# Patient Record
Sex: FEMALE | Race: WHITE | Employment: OTHER | URBAN - METROPOLITAN AREA
[De-identification: names, ages, dates, MRNs, and addresses within clinical notes are randomized per-mention and may not be internally consistent; named-entity substitution may affect disease eponyms.]

---

## 2017-04-06 DIAGNOSIS — Z12.31 ENCOUNTER FOR SCREENING MAMMOGRAM FOR MALIGNANT NEOPLASM OF BREAST: ICD-10-CM

## 2017-05-01 ENCOUNTER — HOSPITAL ENCOUNTER (OUTPATIENT)
Dept: MAMMOGRAPHY | Facility: CLINIC | Age: 62
Discharge: HOME | End: 2017-05-01
Payer: COMMERCIAL

## 2017-05-01 DIAGNOSIS — Z12.31 ENCOUNTER FOR SCREENING MAMMOGRAM FOR MALIGNANT NEOPLASM OF BREAST: ICD-10-CM

## 2017-05-01 PROCEDURE — 77063 BREAST TOMOSYNTHESIS BI: CPT

## 2017-05-01 PROCEDURE — G0202 SCR MAMMO BI INCL CAD: HCPCS

## 2017-05-11 ENCOUNTER — ALLSCRIPTS OFFICE VISIT (OUTPATIENT)
Dept: OTHER | Facility: OTHER | Age: 62
End: 2017-05-11

## 2017-05-15 LAB
HPV 18 (HISTORICAL): NOT DETECTED
HPV HIGH RISK 16/18 (HISTORICAL): NOT DETECTED
HPV16 (HISTORICAL): NOT DETECTED
PAP (HISTORICAL): NORMAL

## 2018-01-13 VITALS
DIASTOLIC BLOOD PRESSURE: 62 MMHG | HEIGHT: 65 IN | SYSTOLIC BLOOD PRESSURE: 112 MMHG | WEIGHT: 145 LBS | BODY MASS INDEX: 24.16 KG/M2

## 2018-06-01 ENCOUNTER — TRANSCRIBE ORDERS (OUTPATIENT)
Dept: OBGYN CLINIC | Facility: CLINIC | Age: 63
End: 2018-06-01

## 2018-06-01 ENCOUNTER — TELEPHONE (OUTPATIENT)
Dept: OBGYN CLINIC | Facility: CLINIC | Age: 63
End: 2018-06-01

## 2018-06-01 ENCOUNTER — TRANSCRIBE ORDERS (OUTPATIENT)
Dept: ADMINISTRATIVE | Facility: HOSPITAL | Age: 63
End: 2018-06-01

## 2018-06-01 DIAGNOSIS — Z12.39 BREAST CANCER SCREENING: Primary | ICD-10-CM

## 2018-06-01 DIAGNOSIS — Z12.39 SCREENING BREAST EXAMINATION: Primary | ICD-10-CM

## 2018-06-07 ENCOUNTER — HOSPITAL ENCOUNTER (OUTPATIENT)
Dept: MAMMOGRAPHY | Facility: CLINIC | Age: 63
Discharge: HOME | End: 2018-06-07
Payer: COMMERCIAL

## 2018-06-07 DIAGNOSIS — Z12.39 SCREENING BREAST EXAMINATION: ICD-10-CM

## 2018-06-07 PROCEDURE — 77067 SCR MAMMO BI INCL CAD: CPT

## 2018-06-07 PROCEDURE — 77063 BREAST TOMOSYNTHESIS BI: CPT

## 2018-12-28 DIAGNOSIS — N95.2 VAGINAL ATROPHY: Primary | ICD-10-CM

## 2018-12-28 RX ORDER — ESTRADIOL 2 MG/1
RING VAGINAL
Qty: 1 EACH | Refills: 3 | Status: SHIPPED | OUTPATIENT
Start: 2018-12-28 | End: 2019-05-30 | Stop reason: SDUPTHER

## 2019-05-30 ENCOUNTER — ANNUAL EXAM (OUTPATIENT)
Dept: OBGYN CLINIC | Facility: CLINIC | Age: 64
End: 2019-05-30
Payer: COMMERCIAL

## 2019-05-30 VITALS
DIASTOLIC BLOOD PRESSURE: 64 MMHG | BODY MASS INDEX: 24.32 KG/M2 | HEIGHT: 65 IN | WEIGHT: 146 LBS | SYSTOLIC BLOOD PRESSURE: 110 MMHG

## 2019-05-30 DIAGNOSIS — Z01.419 ENCOUNTER FOR ANNUAL ROUTINE GYNECOLOGICAL EXAMINATION: Primary | ICD-10-CM

## 2019-05-30 DIAGNOSIS — Z12.39 SCREENING FOR MALIGNANT NEOPLASM OF BREAST: ICD-10-CM

## 2019-05-30 DIAGNOSIS — Z12.4 PAP SMEAR FOR CERVICAL CANCER SCREENING: ICD-10-CM

## 2019-05-30 DIAGNOSIS — N95.2 VAGINAL ATROPHY: ICD-10-CM

## 2019-05-30 PROCEDURE — S0612 ANNUAL GYNECOLOGICAL EXAMINA: HCPCS | Performed by: OBSTETRICS & GYNECOLOGY

## 2019-05-30 RX ORDER — ATORVASTATIN CALCIUM 10 MG/1
TABLET, FILM COATED ORAL
Refills: 1 | COMMUNITY
Start: 2019-04-27

## 2019-05-30 RX ORDER — CHLORAL HYDRATE 500 MG
1 CAPSULE ORAL DAILY
COMMUNITY
End: 2021-06-10 | Stop reason: HOSPADM

## 2019-05-30 RX ORDER — DIPHENOXYLATE HYDROCHLORIDE AND ATROPINE SULFATE 2.5; .025 MG/1; MG/1
1 TABLET ORAL DAILY
COMMUNITY

## 2019-05-30 RX ORDER — LEVOTHYROXINE SODIUM 0.1 MG/1
100 TABLET ORAL DAILY
Refills: 1 | COMMUNITY
Start: 2019-05-10

## 2019-05-30 RX ORDER — ESTRADIOL 0.1 MG/G
0.5 CREAM VAGINAL DAILY
Qty: 42.5 G | Refills: 2 | Status: SHIPPED | OUTPATIENT
Start: 2019-05-30 | End: 2020-06-25 | Stop reason: ALTCHOICE

## 2019-05-30 RX ORDER — CYCLOSPORINE 0.5 MG/ML
1 EMULSION OPHTHALMIC EVERY 12 HOURS
COMMUNITY

## 2019-05-30 RX ORDER — FAMOTIDINE 20 MG
5000 TABLET ORAL DAILY
COMMUNITY

## 2019-06-05 LAB
HPV HR 12 DNA CVX QL NAA+PROBE: NOT DETECTED
HPV16 DNA SPEC QL NAA+PROBE: NOT DETECTED
HPV18 DNA SPEC QL NAA+PROBE: NOT DETECTED
THIN PREP CVX: NORMAL

## 2019-06-18 ENCOUNTER — HOSPITAL ENCOUNTER (OUTPATIENT)
Dept: MAMMOGRAPHY | Facility: CLINIC | Age: 64
Discharge: HOME | End: 2019-06-18
Payer: COMMERCIAL

## 2019-06-18 VITALS — BODY MASS INDEX: 23.66 KG/M2 | HEIGHT: 65 IN | WEIGHT: 142 LBS

## 2019-06-18 DIAGNOSIS — Z12.39 SCREENING FOR MALIGNANT NEOPLASM OF BREAST: ICD-10-CM

## 2019-06-18 PROCEDURE — 77067 SCR MAMMO BI INCL CAD: CPT

## 2019-06-18 PROCEDURE — 77063 BREAST TOMOSYNTHESIS BI: CPT

## 2019-10-02 ENCOUNTER — TRANSCRIBE ORDERS (OUTPATIENT)
Dept: ADMINISTRATIVE | Facility: HOSPITAL | Age: 64
End: 2019-10-02

## 2019-10-02 DIAGNOSIS — Z13.820 SPECIAL SCREENING FOR OSTEOPOROSIS: Primary | ICD-10-CM

## 2019-10-25 ENCOUNTER — OFFICE VISIT (OUTPATIENT)
Dept: GASTROENTEROLOGY | Facility: CLINIC | Age: 64
End: 2019-10-25
Payer: COMMERCIAL

## 2019-10-25 ENCOUNTER — APPOINTMENT (OUTPATIENT)
Dept: LAB | Facility: CLINIC | Age: 64
End: 2019-10-25
Payer: COMMERCIAL

## 2019-10-25 VITALS
WEIGHT: 149 LBS | HEART RATE: 61 BPM | HEIGHT: 65 IN | SYSTOLIC BLOOD PRESSURE: 108 MMHG | BODY MASS INDEX: 24.83 KG/M2 | DIASTOLIC BLOOD PRESSURE: 70 MMHG | RESPIRATION RATE: 16 BRPM

## 2019-10-25 DIAGNOSIS — K59.09 OTHER CONSTIPATION: ICD-10-CM

## 2019-10-25 DIAGNOSIS — Z83.79 FAMILY HISTORY OF CROHN'S DISEASE: ICD-10-CM

## 2019-10-25 DIAGNOSIS — K59.09 OTHER CONSTIPATION: Primary | ICD-10-CM

## 2019-10-25 DIAGNOSIS — R15.1 FECAL SMEARING: ICD-10-CM

## 2019-10-25 LAB
CRP SERPL QL: <3 MG/L
ERYTHROCYTE [SEDIMENTATION RATE] IN BLOOD: 10 MM/HOUR (ref 0–20)

## 2019-10-25 PROCEDURE — 86255 FLUORESCENT ANTIBODY SCREEN: CPT

## 2019-10-25 PROCEDURE — 86140 C-REACTIVE PROTEIN: CPT

## 2019-10-25 PROCEDURE — 36415 COLL VENOUS BLD VENIPUNCTURE: CPT

## 2019-10-25 PROCEDURE — 99244 OFF/OP CNSLTJ NEW/EST MOD 40: CPT | Performed by: INTERNAL MEDICINE

## 2019-10-25 PROCEDURE — 83516 IMMUNOASSAY NONANTIBODY: CPT

## 2019-10-25 PROCEDURE — 85652 RBC SED RATE AUTOMATED: CPT

## 2019-10-25 PROCEDURE — 82784 ASSAY IGA/IGD/IGG/IGM EACH: CPT

## 2019-10-25 NOTE — LETTER
October 25, 2019     Bernadine Urrutia MD  [address] [facility] [address]    Patient: Cheyanne Millan   YOB: 1955   Date of Visit: 10/25/2019       Dear Dr Ranjith Keene: Thank you for referring Jordon Hoskins to me for evaluation  Below are my notes for this consultation  If you have questions, please do not hesitate to call me  I look forward to following your patient along with you  Sincerely,        Anahy Cerda MD        CC: No Recipients  Anahy Cerda MD  10/25/2019  9:50 AM  Incomplete  [facility]    Dear Darío Linton,    I had the pleasure of seeing your patient Cheyanne Millan in the office today and I thank you for this kind referral        Chief Complaint:  Bowel problems      HPI:  Cheyanne Millan is a 59 y o  female who presents with a history of constipation going back about 10 years, to her ernestine menopausal years  According to the patient she has been constipated chronically since then  She sees Dr Mckenna Babb for colonoscopy on a regular basis and has had no Endo colonic disease  The patient notes that she has triggers for fecal incontinence and leakage of liquid and pasty stool  These include caffeine, as well as physical or emotional stress  Patient had undergone anorectal manometry which was negative  Patient is concerned about the possibility of an inflammatory disease or celiac disease  She notes a history of Crohn's disease in 2 1st cousins, and a history of ankylosing spondylitis diagnosed in her father  Patient has no rectal bleeding  She denies any weight loss  No abdominal pain  No fever or chills  No nausea or vomiting  She has no extraintestinal manifestations of any inflammatory bowel disease  She denies any significant skin lesions except for a benign brown spot followed by Dermatology  She has no joint problems  No ocular pain    She has no hepatic symptomatology at all, no jaundice, no dark urine, no chad colored stool, no other GI complaints         Review of Systems:   Constitutional: No fever or chills, feels well, no tiredness, no recent weight gain or weight loss  HENT: No complaints of earache, no hearing loss, no nosebleeds, no nasal discharge, no sore throat, no hoarseness  Eyes: No complaints of eye pain, no red eyes, no discharge from eyes, no itchy eyes  Cardiovascular: No complaints of slow heart rate, no fast heart rate, no chest pain, no palpitations, no leg claudication, no lower extremity edema  Respiratory: No complaints of shortness of breath, no wheezing, no cough, no SOB on exertion, no orthopnea  Gastrointestinal: As noted in HPI  Genitourinary: No complaints of dysuria, no incontinence, no hesitancy, no nocturia  Musculoskeletal: No complaints of arthralgia, no myalgias, no joint swelling or stiffness, no limb pain or swelling  Neurological: No complaints of headache, no confusion, no convulsions, no numbness or tingling, no dizziness or fainting, no limb weakness, no difficulty walking  Skin: No complaints of skin rash or skin lesions, no itching, no skin wound, no dry skin  Hematological/Lymphatic: No complaints of swollen glands, does not bleed easy  Allergic/Immunologic: No immunocompromised state  Endocrine:  No complaints of polyuria, no polydipsia  Psychiatric/Behavioral: is not suicidal, no sleep disturbances, no anxiety or depression, no change in personality, no emotional problems         Historical Information   Past Medical History:   Diagnosis Date    Disease of thyroid gland     High cholesterol     Osteopenia     Peroneal tendonitis      Past Surgical History:   Procedure Laterality Date    COLON MANOMETRY       Social History   Social History     Substance and Sexual Activity   Alcohol Use Yes    Frequency: Monthly or less    Drinks per session: 1 or 2     Social History     Substance and Sexual Activity   Drug Use Not Currently Social History     Tobacco Use   Smoking Status Never Smoker   Smokeless Tobacco Never Used     Family History   Problem Relation Age of Onset    Lymphoma Mother     Ankylosing spondylitis Father     Arthritis Father     Lung cancer Father     Diabetes Paternal Grandmother     Heart disease Paternal Grandfather     Hypertension Paternal Grandfather     No Known Problems Sister     Breast cancer Paternal Aunt     Crohn's disease Cousin          Current Medications: has a current medication list which includes the following prescription(s): atorvastatin, calcium carbonate-vit d-min, cyclosporine, levothyroxine, multivitamin, fish oil, vitamin d (cholecalciferol), estradiol, and estradiol  Vital Signs: /70   Pulse 61   Resp 16   Ht 5' 5" (1 651 m)   Wt 67 6 kg (149 lb)   BMI 24 79 kg/m²      Physical Exam:   Constitutional  General Appearance: No acute distress, well appearing and well nourished  Head  Normocephalic  Eyes  Conjunctivae and lids: No swelling, erythema, or discharge  Pupils and irises: Equal, round and reactive to light  Ears, Nose, Mouth, and Throat  External inspection of ears and nose: Normal  Nasal mucosa, septum and turbinates: Normal without edema or erythema/   Oropharynx: Normal with no erythema, edema, exudate or lesions  Neck  Normal range of motion  Neck supple  Cardiovascular  Auscultation of the heart: Normal rate and rhythm, normal S1 and S2 without murmurs  Examination of the extremities for edema and/or varicosities: Normal  Pulmonary/Chest  Respiratory effort: No increased work of breathing or signs of respiratory distress  Auscultation of lungs: Clear to auscultation, equal breath sounds bilaterally, no wheezes, rales, no rhonchi  Abdomen  Abdomen: Non-tender, no masses  Liver and spleen: No hepatomegaly or splenomegaly     Musculoskeletal  Gait and station: normal   Digits and Nails: normal without clubbing or cyanosis  Inspection/palpation of joints, bones, and muscles: Normal  Neurological  No nystagmus or asterixis  Skin  Skin and subcutaneous tissue: Normal without rashes or lesions  Lymphatic  Palpation of the lymph nodes in neck: No lymphadenopathy  Psychiatric  Orientation to person, place and time: Normal   Mood and affect: Normal          Labs:   No results found for: ALT, AST, BUN, CALCIUM, CL, CHOL, CO2, CREATININE, GFRAA, GFRNONAA, HDL, HCT, HGB, HGBA1C, LDL, MG, PHOS, PLT, K, PSA, NA, TRIG, WBC      X-Rays & Procedures:   No orders to display         ______________________________________________________________________      Assessment & Plan:      Diagnoses and all orders for this visit:    Other constipation  -     C-reactive protein; Future  -     Sedimentation rate, automated; Future  -     Celiac Disease Antibody Profile; Future  -     Calprotectin,Fecal; Future    Fecal smearing  -     C-reactive protein; Future  -     Sedimentation rate, automated; Future  -     Celiac Disease Antibody Profile; Future  -     Calprotectin,Fecal; Future    Family history of Crohn's disease  -     C-reactive protein; Future  -     Sedimentation rate, automated; Future  -     Celiac Disease Antibody Profile; Future  -     Calprotectin,Fecal; Future            I have taken liberty of ordering some indirect measures of possible inflammatory intestinal disease, including a stool calprotectin, CMP, ESR, and a celiac panel  The patient will call me for the results and any further recommendations  If these are all negative than it is likely she has inflammatory bowel disease constipation predominant and will follow up with Dr Ramses Dimas as needed  I will be happy to inform you of her results and further suggestions, and I would like to thank you for allowing me to participate in her care          With warmest regards,    Tam Youssef MD, [facility]

## 2019-10-25 NOTE — PROGRESS NOTES
[facility]    Dear Glenetta Gilford,    I had the pleasure of seeing your patient Jay Martinez in the office today and I thank you for this kind referral        Chief Complaint:  Bowel problems      HPI:  Jay Martinez is a 59 y o  female who presents with a history of constipation going back about 10 years, to her ernestine menopausal years  According to the patient she has been constipated chronically since then  She sees Dr Sheila Deleon for colonoscopy on a regular basis and has had no Endo colonic disease  The patient notes that she has triggers for fecal incontinence and leakage of liquid and pasty stool  These include caffeine, as well as physical or emotional stress  Patient had undergone anorectal manometry which was negative  Patient is concerned about the possibility of an inflammatory disease or celiac disease  She notes a history of Crohn's disease in 2 1st cousins, and a history of ankylosing spondylitis diagnosed in her father  Patient has no rectal bleeding  She denies any weight loss  No abdominal pain  No fever or chills  No nausea or vomiting  She has no extraintestinal manifestations of any inflammatory bowel disease  She denies any significant skin lesions except for a benign brown spot followed by Dermatology  She has no joint problems  No ocular pain  She has no hepatic symptomatology at all, no jaundice, no dark urine, no chad colored stool, no other GI complaints         Review of Systems:   Constitutional: No fever or chills, feels well, no tiredness, no recent weight gain or weight loss  HENT: No complaints of earache, no hearing loss, no nosebleeds, no nasal discharge, no sore throat, no hoarseness  Eyes: No complaints of eye pain, no red eyes, no discharge from eyes, no itchy eyes  Cardiovascular: No complaints of slow heart rate, no fast heart rate, no chest pain, no palpitations, no leg claudication, no lower extremity edema     Respiratory: No complaints of shortness of breath, no wheezing, no cough, no SOB on exertion, no orthopnea  Gastrointestinal: As noted in HPI  Genitourinary: No complaints of dysuria, no incontinence, no hesitancy, no nocturia  Musculoskeletal: No complaints of arthralgia, no myalgias, no joint swelling or stiffness, no limb pain or swelling  Neurological: No complaints of headache, no confusion, no convulsions, no numbness or tingling, no dizziness or fainting, no limb weakness, no difficulty walking  Skin: No complaints of skin rash or skin lesions, no itching, no skin wound, no dry skin  Hematological/Lymphatic: No complaints of swollen glands, does not bleed easy  Allergic/Immunologic: No immunocompromised state  Endocrine:  No complaints of polyuria, no polydipsia  Psychiatric/Behavioral: is not suicidal, no sleep disturbances, no anxiety or depression, no change in personality, no emotional problems         Historical Information   Past Medical History:   Diagnosis Date    Disease of thyroid gland     High cholesterol     Osteopenia     Peroneal tendonitis      Past Surgical History:   Procedure Laterality Date    COLON MANOMETRY       Social History   Social History     Substance and Sexual Activity   Alcohol Use Yes    Frequency: Monthly or less    Drinks per session: 1 or 2     Social History     Substance and Sexual Activity   Drug Use Not Currently     Social History     Tobacco Use   Smoking Status Never Smoker   Smokeless Tobacco Never Used     Family History   Problem Relation Age of Onset    Lymphoma Mother     Ankylosing spondylitis Father     Arthritis Father     Lung cancer Father     Diabetes Paternal Grandmother     Heart disease Paternal Grandfather     Hypertension Paternal Grandfather     No Known Problems Sister     Breast cancer Paternal Aunt     Crohn's disease Cousin          Current Medications: has a current medication list which includes the following prescription(s): atorvastatin, calcium carbonate-vit d-min, cyclosporine, levothyroxine, multivitamin, fish oil, vitamin d (cholecalciferol), estradiol, and estradiol  Vital Signs: /70   Pulse 61   Resp 16   Ht 5' 5" (1 651 m)   Wt 67 6 kg (149 lb)   BMI 24 79 kg/m²     Physical Exam:   Constitutional  General Appearance: No acute distress, well appearing and well nourished  Head  Normocephalic  Eyes  Conjunctivae and lids: No swelling, erythema, or discharge  Pupils and irises: Equal, round and reactive to light  Ears, Nose, Mouth, and Throat  External inspection of ears and nose: Normal  Nasal mucosa, septum and turbinates: Normal without edema or erythema/   Oropharynx: Normal with no erythema, edema, exudate or lesions  Neck  Normal range of motion  Neck supple  Cardiovascular  Auscultation of the heart: Normal rate and rhythm, normal S1 and S2 without murmurs  Examination of the extremities for edema and/or varicosities: Normal  Pulmonary/Chest  Respiratory effort: No increased work of breathing or signs of respiratory distress  Auscultation of lungs: Clear to auscultation, equal breath sounds bilaterally, no wheezes, rales, no rhonchi  Abdomen  Abdomen: Non-tender, no masses  Liver and spleen: No hepatomegaly or splenomegaly  Musculoskeletal  Gait and station: normal   Digits and Nails: normal without clubbing or cyanosis  Inspection/palpation of joints, bones, and muscles: Normal  Neurological  No nystagmus or asterixis  Skin  Skin and subcutaneous tissue: Normal without rashes or lesions  Lymphatic  Palpation of the lymph nodes in neck: No lymphadenopathy     Psychiatric  Orientation to person, place and time: Normal   Mood and affect: Normal          Labs:   No results found for: ALT, AST, BUN, CALCIUM, CL, CHOL, CO2, CREATININE, GFRAA, GFRNONAA, HDL, HCT, HGB, HGBA1C, LDL, MG, PHOS, PLT, K, PSA, NA, TRIG, WBC      X-Rays & Procedures:   No orders to display ______________________________________________________________________      Assessment & Plan:      Diagnoses and all orders for this visit:    Other constipation  -     C-reactive protein; Future  -     Sedimentation rate, automated; Future  -     Celiac Disease Antibody Profile; Future  -     Calprotectin,Fecal; Future    Fecal smearing  -     C-reactive protein; Future  -     Sedimentation rate, automated; Future  -     Celiac Disease Antibody Profile; Future  -     Calprotectin,Fecal; Future    Family history of Crohn's disease  -     C-reactive protein; Future  -     Sedimentation rate, automated; Future  -     Celiac Disease Antibody Profile; Future  -     Calprotectin,Fecal; Future            I have taken liberty of ordering some indirect measures of possible inflammatory intestinal disease, including a stool calprotectin, CMP, ESR, and a celiac panel  The patient will call me for the results and any further recommendations  If these are all negative than it is likely she has inflammatory bowel disease constipation predominant and will follow up with Dr Gela Bauman as needed  I will be happy to inform you of her results and further suggestions, and I would like to thank you for allowing me to participate in her care          With warmest regards,    Naldo Quarles MD, [facility]

## 2019-10-27 LAB
ENDOMYSIUM IGA SER QL: NEGATIVE
GLIADIN PEPTIDE IGA SER-ACNC: 3 UNITS (ref 0–19)
GLIADIN PEPTIDE IGG SER-ACNC: 3 UNITS (ref 0–19)
IGA SERPL-MCNC: 184 MG/DL (ref 87–352)
TTG IGA SER-ACNC: <2 U/ML (ref 0–3)
TTG IGG SER-ACNC: <2 U/ML (ref 0–5)

## 2019-10-28 ENCOUNTER — APPOINTMENT (OUTPATIENT)
Dept: LAB | Facility: CLINIC | Age: 64
End: 2019-10-28
Payer: COMMERCIAL

## 2019-10-28 DIAGNOSIS — Z83.79 FAMILY HISTORY OF CROHN'S DISEASE: ICD-10-CM

## 2019-10-28 DIAGNOSIS — R15.1 FECAL SMEARING: ICD-10-CM

## 2019-10-28 DIAGNOSIS — K59.09 OTHER CONSTIPATION: ICD-10-CM

## 2019-10-28 PROCEDURE — 83993 ASSAY FOR CALPROTECTIN FECAL: CPT

## 2019-10-29 ENCOUNTER — HOSPITAL ENCOUNTER (OUTPATIENT)
Dept: MAMMOGRAPHY | Facility: CLINIC | Age: 64
Discharge: HOME | End: 2019-10-29
Payer: COMMERCIAL

## 2019-10-29 DIAGNOSIS — Z13.820 SPECIAL SCREENING FOR OSTEOPOROSIS: ICD-10-CM

## 2019-10-29 PROCEDURE — 77080 DXA BONE DENSITY AXIAL: CPT

## 2019-10-30 LAB — CALPROTECTIN STL-MCNT: <16 UG/G (ref 0–120)

## 2019-12-19 ENCOUNTER — TELEPHONE (OUTPATIENT)
Dept: GASTROENTEROLOGY | Facility: CLINIC | Age: 64
End: 2019-12-19

## 2020-01-24 ENCOUNTER — TELEPHONE (OUTPATIENT)
Dept: OBGYN CLINIC | Facility: CLINIC | Age: 65
End: 2020-01-24

## 2020-01-24 DIAGNOSIS — N95.2 VAGINAL ATROPHY: ICD-10-CM

## 2020-01-24 NOTE — TELEPHONE ENCOUNTER
Patient would like to know if there is a more cost effective alternative to Estring   Aware will review with Dr MOREL and let her know

## 2020-01-25 NOTE — TELEPHONE ENCOUNTER
She can check with her prescription plan to see if any of the creams:  Estrace or Premarin, or vagifem are covered or less expensive

## 2020-02-04 ENCOUNTER — TRANSCRIBE ORDERS (OUTPATIENT)
Dept: ADMINISTRATIVE | Facility: HOSPITAL | Age: 65
End: 2020-02-04

## 2020-02-04 ENCOUNTER — APPOINTMENT (OUTPATIENT)
Dept: RADIOLOGY | Facility: CLINIC | Age: 65
End: 2020-02-04
Payer: MEDICARE

## 2020-02-04 DIAGNOSIS — G89.29 CHRONIC IDIOPATHIC ANAL PAIN: ICD-10-CM

## 2020-02-04 DIAGNOSIS — K62.89 CHRONIC IDIOPATHIC ANAL PAIN: ICD-10-CM

## 2020-02-04 DIAGNOSIS — M54.50 LOW BACK PAIN WITHOUT SCIATICA, UNSPECIFIED BACK PAIN LATERALITY, UNSPECIFIED CHRONICITY: ICD-10-CM

## 2020-02-04 DIAGNOSIS — M54.50 LOW BACK PAIN WITHOUT SCIATICA, UNSPECIFIED BACK PAIN LATERALITY, UNSPECIFIED CHRONICITY: Primary | ICD-10-CM

## 2020-02-04 PROCEDURE — 72100 X-RAY EXAM L-S SPINE 2/3 VWS: CPT

## 2020-02-24 ENCOUNTER — TRANSCRIBE ORDERS (OUTPATIENT)
Dept: ADMINISTRATIVE | Facility: HOSPITAL | Age: 65
End: 2020-02-24

## 2020-02-24 ENCOUNTER — TELEPHONE (OUTPATIENT)
Dept: OBGYN CLINIC | Facility: CLINIC | Age: 65
End: 2020-02-24

## 2020-02-24 DIAGNOSIS — Z12.31 VISIT FOR SCREENING MAMMOGRAM: Primary | ICD-10-CM

## 2020-02-24 DIAGNOSIS — Z12.31 ENCOUNTER FOR SCREENING MAMMOGRAM FOR BREAST CANCER: Primary | ICD-10-CM

## 2020-06-22 ENCOUNTER — HOSPITAL ENCOUNTER (OUTPATIENT)
Dept: MAMMOGRAPHY | Facility: CLINIC | Age: 65
Discharge: HOME | End: 2020-06-22
Payer: MEDICARE

## 2020-06-22 VITALS — WEIGHT: 149 LBS | HEIGHT: 65 IN | BODY MASS INDEX: 24.83 KG/M2

## 2020-06-22 DIAGNOSIS — Z12.31 ENCOUNTER FOR SCREENING MAMMOGRAM FOR BREAST CANCER: ICD-10-CM

## 2020-06-22 PROCEDURE — 77063 BREAST TOMOSYNTHESIS BI: CPT

## 2020-06-22 PROCEDURE — 77067 SCR MAMMO BI INCL CAD: CPT

## 2020-06-25 ENCOUNTER — ANNUAL EXAM (OUTPATIENT)
Dept: OBGYN CLINIC | Facility: CLINIC | Age: 65
End: 2020-06-25
Payer: MEDICARE

## 2020-06-25 VITALS
SYSTOLIC BLOOD PRESSURE: 98 MMHG | HEIGHT: 65 IN | WEIGHT: 146.6 LBS | DIASTOLIC BLOOD PRESSURE: 62 MMHG | TEMPERATURE: 97 F | BODY MASS INDEX: 24.43 KG/M2

## 2020-06-25 DIAGNOSIS — Z12.39 SCREENING FOR MALIGNANT NEOPLASM OF BREAST: ICD-10-CM

## 2020-06-25 DIAGNOSIS — N95.2 VAGINAL ATROPHY: ICD-10-CM

## 2020-06-25 DIAGNOSIS — Z01.419 ENCOUNTER FOR ANNUAL ROUTINE GYNECOLOGICAL EXAMINATION: Primary | ICD-10-CM

## 2020-06-25 DIAGNOSIS — Z12.4 PAP SMEAR FOR CERVICAL CANCER SCREENING: ICD-10-CM

## 2020-06-25 PROCEDURE — G0145 SCR C/V CYTO,THINLAYER,RESCR: HCPCS | Performed by: OBSTETRICS & GYNECOLOGY

## 2020-06-25 PROCEDURE — 87624 HPV HI-RISK TYP POOLED RSLT: CPT | Performed by: OBSTETRICS & GYNECOLOGY

## 2020-06-25 PROCEDURE — G0101 CA SCREEN;PELVIC/BREAST EXAM: HCPCS | Performed by: OBSTETRICS & GYNECOLOGY

## 2020-06-25 RX ORDER — ESTRADIOL 1 MG/1
1 TABLET ORAL DAILY
Qty: 30 TABLET | Refills: 2 | Status: SHIPPED | OUTPATIENT
Start: 2020-06-25 | End: 2020-06-25

## 2020-06-25 RX ORDER — ESTRADIOL 1 MG/1
TABLET ORAL
Qty: 90 TABLET | Refills: 2 | Status: SHIPPED | OUTPATIENT
Start: 2020-06-25 | End: 2020-07-07 | Stop reason: SDUPTHER

## 2020-06-27 LAB
HPV HR 12 DNA CVX QL NAA+PROBE: NEGATIVE
HPV16 DNA CVX QL NAA+PROBE: NEGATIVE
HPV18 DNA CVX QL NAA+PROBE: NEGATIVE

## 2020-06-30 LAB
LAB AP GYN PRIMARY INTERPRETATION: NORMAL
Lab: NORMAL

## 2020-07-07 DIAGNOSIS — N95.2 VAGINAL ATROPHY: Primary | ICD-10-CM

## 2020-07-07 DIAGNOSIS — N95.2 VAGINAL ATROPHY: ICD-10-CM

## 2020-07-07 RX ORDER — ESTRADIOL 0.1 MG/G
0.5 CREAM VAGINAL DAILY
Qty: 42.5 G | Refills: 2 | Status: SHIPPED | OUTPATIENT
Start: 2020-07-07 | End: 2020-11-19

## 2020-07-07 RX ORDER — ESTRADIOL 1 MG/1
1 TABLET ORAL DAILY
Qty: 90 TABLET | Refills: 2 | Status: SHIPPED | OUTPATIENT
Start: 2020-07-07 | End: 2020-12-07

## 2020-07-07 NOTE — TELEPHONE ENCOUNTER
Pt saw Dr Reyes 6/25/20 thought she has a refill left on the Estrace cream but did not  Would like reordered

## 2020-07-22 ENCOUNTER — TELEPHONE (OUTPATIENT)
Dept: OBGYN CLINIC | Facility: CLINIC | Age: 65
End: 2020-07-22

## 2020-07-22 NOTE — TELEPHONE ENCOUNTER
Ok to [employer] - looked at script for mammo which states December and thinks is too soon as she would not be due for a year   Would like clarification

## 2020-10-12 ENCOUNTER — TRANSCRIBE ORDERS (OUTPATIENT)
Dept: ADMINISTRATIVE | Facility: HOSPITAL | Age: 65
End: 2020-10-12

## 2020-10-12 DIAGNOSIS — M54.50 LOW BACK PAIN: Primary | ICD-10-CM

## 2020-10-22 ENCOUNTER — HOSPITAL ENCOUNTER (OUTPATIENT)
Dept: MRI IMAGING | Facility: CLINIC | Age: 65
Discharge: HOME | End: 2020-10-22
Payer: MEDICARE

## 2020-10-22 DIAGNOSIS — M54.50 LOW BACK PAIN: ICD-10-CM

## 2020-10-22 PROCEDURE — 72148 MRI LUMBAR SPINE W/O DYE: CPT

## 2020-10-22 PROCEDURE — G1004 CDSM NDSC: HCPCS

## 2020-11-10 ENCOUNTER — TRANSCRIBE ORDERS (OUTPATIENT)
Dept: NEUROSURGERY | Facility: CLINIC | Age: 65
End: 2020-11-10

## 2020-11-10 DIAGNOSIS — M54.50 LOWER BACK PAIN: Primary | ICD-10-CM

## 2020-11-19 ENCOUNTER — OFFICE VISIT (OUTPATIENT)
Dept: NEUROSURGERY | Facility: CLINIC | Age: 65
End: 2020-11-19
Payer: MEDICARE

## 2020-11-19 VITALS
RESPIRATION RATE: 16 BRPM | HEIGHT: 65 IN | WEIGHT: 144.1 LBS | HEART RATE: 60 BPM | SYSTOLIC BLOOD PRESSURE: 102 MMHG | TEMPERATURE: 97.4 F | BODY MASS INDEX: 24.01 KG/M2 | DIASTOLIC BLOOD PRESSURE: 78 MMHG

## 2020-11-19 DIAGNOSIS — M54.50 LOWER BACK PAIN: Primary | ICD-10-CM

## 2020-11-19 DIAGNOSIS — M47.816 FACET ARTHROPATHY, LUMBAR: ICD-10-CM

## 2020-11-19 PROCEDURE — 99203 OFFICE O/P NEW LOW 30 MIN: CPT | Performed by: NEUROLOGICAL SURGERY

## 2020-11-20 ENCOUNTER — TELEPHONE (OUTPATIENT)
Dept: NEUROSURGERY | Facility: CLINIC | Age: 65
End: 2020-11-20

## 2020-12-07 ENCOUNTER — CONSULT (OUTPATIENT)
Dept: PAIN MEDICINE | Facility: CLINIC | Age: 65
End: 2020-12-07
Payer: MEDICARE

## 2020-12-07 VITALS
BODY MASS INDEX: 24.12 KG/M2 | WEIGHT: 144.8 LBS | SYSTOLIC BLOOD PRESSURE: 105 MMHG | HEART RATE: 56 BPM | DIASTOLIC BLOOD PRESSURE: 73 MMHG | HEIGHT: 65 IN | RESPIRATION RATE: 18 BRPM

## 2020-12-07 DIAGNOSIS — M54.16 LUMBAR RADICULOPATHY: ICD-10-CM

## 2020-12-07 DIAGNOSIS — M47.816 FACET ARTHROPATHY, LUMBAR: Primary | ICD-10-CM

## 2020-12-07 DIAGNOSIS — G89.29 CHRONIC RIGHT-SIDED LOW BACK PAIN WITH RIGHT-SIDED SCIATICA: ICD-10-CM

## 2020-12-07 DIAGNOSIS — M54.41 CHRONIC RIGHT-SIDED LOW BACK PAIN WITH RIGHT-SIDED SCIATICA: ICD-10-CM

## 2020-12-07 PROCEDURE — 99204 OFFICE O/P NEW MOD 45 MIN: CPT | Performed by: STUDENT IN AN ORGANIZED HEALTH CARE EDUCATION/TRAINING PROGRAM

## 2020-12-11 ENCOUNTER — TELEPHONE (OUTPATIENT)
Dept: RADIOLOGY | Facility: CLINIC | Age: 65
End: 2020-12-11

## 2020-12-22 ENCOUNTER — HOSPITAL ENCOUNTER (OUTPATIENT)
Dept: RADIOLOGY | Facility: CLINIC | Age: 65
Discharge: HOME | End: 2020-12-22
Attending: STUDENT IN AN ORGANIZED HEALTH CARE EDUCATION/TRAINING PROGRAM
Payer: MEDICARE

## 2020-12-22 VITALS
RESPIRATION RATE: 20 BRPM | OXYGEN SATURATION: 99 % | HEART RATE: 60 BPM | DIASTOLIC BLOOD PRESSURE: 77 MMHG | SYSTOLIC BLOOD PRESSURE: 119 MMHG | TEMPERATURE: 96.2 F

## 2020-12-22 DIAGNOSIS — M54.41 CHRONIC RIGHT-SIDED LOW BACK PAIN WITH RIGHT-SIDED SCIATICA: ICD-10-CM

## 2020-12-22 DIAGNOSIS — M47.816 FACET ARTHROPATHY, LUMBAR: ICD-10-CM

## 2020-12-22 DIAGNOSIS — M54.16 LUMBAR RADICULOPATHY: ICD-10-CM

## 2020-12-22 DIAGNOSIS — G89.29 CHRONIC RIGHT-SIDED LOW BACK PAIN WITH RIGHT-SIDED SCIATICA: ICD-10-CM

## 2020-12-22 PROCEDURE — 62323 NJX INTERLAMINAR LMBR/SAC: CPT | Performed by: STUDENT IN AN ORGANIZED HEALTH CARE EDUCATION/TRAINING PROGRAM

## 2020-12-22 RX ORDER — 0.9 % SODIUM CHLORIDE 0.9 %
4 VIAL (ML) INJECTION ONCE
Status: COMPLETED | OUTPATIENT
Start: 2020-12-22 | End: 2020-12-22

## 2020-12-22 RX ORDER — METHYLPREDNISOLONE ACETATE 80 MG/ML
80 INJECTION, SUSPENSION INTRA-ARTICULAR; INTRALESIONAL; INTRAMUSCULAR; PARENTERAL; SOFT TISSUE ONCE
Status: COMPLETED | OUTPATIENT
Start: 2020-12-22 | End: 2020-12-22

## 2020-12-22 RX ORDER — LIDOCAINE HYDROCHLORIDE 10 MG/ML
5 INJECTION, SOLUTION EPIDURAL; INFILTRATION; INTRACAUDAL; PERINEURAL ONCE
Status: COMPLETED | OUTPATIENT
Start: 2020-12-22 | End: 2020-12-22

## 2020-12-22 RX ADMIN — LIDOCAINE HYDROCHLORIDE 2 ML: 10 INJECTION, SOLUTION EPIDURAL; INFILTRATION; INTRACAUDAL; PERINEURAL at 10:05

## 2020-12-22 RX ADMIN — SODIUM CHLORIDE 4 ML: 9 INJECTION INTRAMUSCULAR; INTRAVENOUS; SUBCUTANEOUS at 10:09

## 2020-12-22 RX ADMIN — IOHEXOL 1 ML: 300 INJECTION, SOLUTION INTRAVENOUS at 10:08

## 2020-12-22 RX ADMIN — METHYLPREDNISOLONE ACETATE 80 MG: 80 INJECTION, SUSPENSION INTRA-ARTICULAR; INTRALESIONAL; INTRAMUSCULAR; PARENTERAL; SOFT TISSUE at 10:09

## 2020-12-29 ENCOUNTER — TELEPHONE (OUTPATIENT)
Dept: PAIN MEDICINE | Facility: CLINIC | Age: 65
End: 2020-12-29

## 2020-12-29 NOTE — TELEPHONE ENCOUNTER
Patient called back to stated that shes is pleased with the injection    no sharp pain in her buttocks area since the injection  No tingling at all  Pain level 2-3/10    improvement is 50%  Patient is taking it easy since the injection & hasnt yet fully returned to her regular routine        [phone]

## 2021-01-18 ENCOUNTER — TELEPHONE (OUTPATIENT)
Dept: OBGYN CLINIC | Facility: CLINIC | Age: 66
End: 2021-01-18

## 2021-01-19 ENCOUNTER — OFFICE VISIT (OUTPATIENT)
Dept: PAIN MEDICINE | Facility: CLINIC | Age: 66
End: 2021-01-19
Payer: MEDICARE

## 2021-01-19 VITALS
BODY MASS INDEX: 24.59 KG/M2 | SYSTOLIC BLOOD PRESSURE: 104 MMHG | RESPIRATION RATE: 16 BRPM | HEIGHT: 65 IN | DIASTOLIC BLOOD PRESSURE: 66 MMHG | HEART RATE: 60 BPM | WEIGHT: 147.6 LBS

## 2021-01-19 DIAGNOSIS — M54.16 LUMBAR RADICULOPATHY: ICD-10-CM

## 2021-01-19 DIAGNOSIS — M47.816 LUMBAR FACET ARTHROPATHY: Primary | ICD-10-CM

## 2021-01-19 PROCEDURE — 99214 OFFICE O/P EST MOD 30 MIN: CPT | Performed by: STUDENT IN AN ORGANIZED HEALTH CARE EDUCATION/TRAINING PROGRAM

## 2021-01-19 NOTE — H&P (VIEW-ONLY)
Pain Medicine Follow-Up Note    Assessment:  1  Lumbar facet arthropathy    2  Lumbar radiculopathy        Plan:  Orders Placed This Encounter   Procedures    FL spine and pain procedure     Standing Status:   Future     Standing Expiration Date:   1/19/2025     Order Specific Question:   Reason for Exam:     Answer:   Right L4-5 LESI     Order Specific Question:   Anticoagulant hold needed? Answer:   No       No orders of the defined types were placed in this encounter  My impressions and treatment recommendations were discussed in detail with the patient who verbalized understanding and had no further questions  This is a 51-year-old female who returns to our office following right sided L4-5 LESI on 12/22/2020  She continues to have at least 50% relief in her right-sided low back/buttock pain and right lower extremity radiculopathy  Given her level of clinical improvement, we discussed repeating the procedure try to provide her additional benefit  I did discuss that her most notable pathology is at the L4-5 level with severe facet hypertrophy  I suspect the injection did help reduce some of the inflammation in the joint space  However, should she have continued pain low back/buttock area, consideration could be given to medial branch block as discussed previously  Given her clinical improvement, however I think it is reasonable to repeat the previously mentioned injection  Complete risks and benefits including bleeding, infection, tissue reaction, nerve injury and allergic reaction were discussed  The approach was demonstrated using models and literature was provided  Verbal and written consent was obtained  [place] Prescription Drug Monitoring Program report was reviewed and was appropriate     Follow-up is planned in 6 weeks following injection or sooner as warranted  Discharge instructions were provided   I personally saw and examined the patient and I agree with the above discussed plan of care  History of Present Illness:    Aminta Pal is a 77 y o  female who presents to [place] [address] [facility] for interval re-evaluation of the above stated pain complaints  The patient has a past medical and chronic pain history as outlined in the assessment section  She was last seen on 12/22/2020 at which time she underwent right-sided L4-5 lumbar epidural steroid injection  She continues to report at least 50% improvement from the procedure in regards to her right-sided low back/buttock pain and right lower extremity radiculopathy  Her pain score varies from 0 to a 4  The pain is worse again at night  The pain is intermittent  The pain is sharp in the buttock area and tingling with pins and needles in the right foot  She reports increased symptoms with carrying anything with weight  She also reports increased symptoms with sitting on soft surfaces, kneeling, and some exercises  She has been trying to exhibit activity restriction  She also notes increased symptoms with lumbar extension exercises  Other than as stated above, the patient denies any interval changes in medications, medical condition, mental condition, symptoms, or allergies since the last office visit  Review of Systems:    Review of Systems   Respiratory: Negative for shortness of breath  Cardiovascular: Negative for chest pain  Gastrointestinal: Negative for constipation, diarrhea, nausea and vomiting  Musculoskeletal: Negative for arthralgias, gait problem, joint swelling and myalgias  Skin: Negative for rash  Neurological: Negative for dizziness, seizures and weakness  All other systems reviewed and are negative          Patient Active Problem List   Diagnosis    Vaginal atrophy    Encounter for annual routine gynecological examination    Lower back pain    Facet arthropathy, lumbar       Past Medical History:   Diagnosis Date    Disease of thyroid gland     High cholesterol     Osteopenia     Peroneal tendonitis        Past Surgical History:   Procedure Laterality Date    COLON MANOMETRY         Family History   Problem Relation Age of Onset    Lymphoma Mother     Ankylosing spondylitis Father     Arthritis Father     Lung cancer Father     Diabetes Paternal Grandmother     Heart disease Paternal Grandfather     Hypertension Paternal Grandfather     No Known Problems Sister     Breast cancer Paternal Aunt     Crohn's disease Cousin        Social History     Occupational History    Not on file   Tobacco Use    Smoking status: Never Smoker    Smokeless tobacco: Never Used   Substance and Sexual Activity    Alcohol use: Yes     Frequency: Monthly or less     Drinks per session: 1 or 2     Comment: very occ    Drug use: Never    Sexual activity: Yes     Partners: Male     Birth control/protection: Post-menopausal     Comment: pt declines hiv std testing         Current Outpatient Medications:     atorvastatin (LIPITOR) 10 mg tablet, TAKE 1 TABLET BY MOUTH EVERY DAY AT NIGHT, Disp: , Rfl: 1    Calcium 200 MG TABS, Take by mouth daily, Disp: , Rfl:     cycloSPORINE (RESTASIS) 0 05 % ophthalmic emulsion, Administer 1 drop to both eyes every 12 (twelve) hours , Disp: , Rfl:     levothyroxine 100 mcg tablet, Take 100 mcg by mouth daily, Disp: , Rfl: 1    multivitamin (THERAGRAN) TABS, Take 1 tablet by mouth daily, Disp: , Rfl:     Omega-3 Fatty Acids (FISH OIL) 1,000 mg, Take 1 capsule by mouth daily, Disp: , Rfl:     Vitamin D, Cholecalciferol, 1000 units CAPS, Take 5,000 Units by mouth daily, Disp: , Rfl:     No Known Allergies    Physical Exam:    /66   Pulse 60   Resp 16   Ht 5' 5" (1 651 m)   Wt 67 kg (147 lb 9 6 oz)   BMI 24 56 kg/m²     Constitutional:normal, well developed, well nourished, alert, in no distress and non-toxic and no overt pain behavior    Eyes:anicteric  HEENT:grossly intact  Neck:supple, symmetric, trachea midline and no masses   Pulmonary:even and unlabored  Cardiovascular:No edema or pitting edema present  Skin:Normal without rashes or lesions and well hydrated  Psychiatric:Mood and affect appropriate  Neurologic:Cranial Nerves II-XII grossly intact  Musculoskeletal:normal      Imaging  FL spine and pain procedure    (Results Pending)         Orders Placed This Encounter   Procedures    FL spine and pain procedure

## 2021-01-19 NOTE — PROGRESS NOTES
Pain Medicine Follow-Up Note    Assessment:  1  Lumbar facet arthropathy    2  Lumbar radiculopathy        Plan:  Orders Placed This Encounter   Procedures    FL spine and pain procedure     Standing Status:   Future     Standing Expiration Date:   1/19/2025     Order Specific Question:   Reason for Exam:     Answer:   Right L4-5 LESI     Order Specific Question:   Anticoagulant hold needed? Answer:   No       No orders of the defined types were placed in this encounter  My impressions and treatment recommendations were discussed in detail with the patient who verbalized understanding and had no further questions  This is a 51-year-old female who returns to our office following right sided L4-5 LESI on 12/22/2020  She continues to have at least 50% relief in her right-sided low back/buttock pain and right lower extremity radiculopathy  Given her level of clinical improvement, we discussed repeating the procedure try to provide her additional benefit  I did discuss that her most notable pathology is at the L4-5 level with severe facet hypertrophy  I suspect the injection did help reduce some of the inflammation in the joint space  However, should she have continued pain low back/buttock area, consideration could be given to medial branch block as discussed previously  Given her clinical improvement, however I think it is reasonable to repeat the previously mentioned injection  Complete risks and benefits including bleeding, infection, tissue reaction, nerve injury and allergic reaction were discussed  The approach was demonstrated using models and literature was provided  Verbal and written consent was obtained  [place] Prescription Drug Monitoring Program report was reviewed and was appropriate     Follow-up is planned in 6 weeks following injection or sooner as warranted  Discharge instructions were provided   I personally saw and examined the patient and I agree with the above discussed plan of care  History of Present Illness:    Aminta Pal is a 77 y o  female who presents to [facility] for interval re-evaluation of the above stated pain complaints  The patient has a past medical and chronic pain history as outlined in the assessment section  She was last seen on 12/22/2020 at which time she underwent right-sided L4-5 lumbar epidural steroid injection  She continues to report at least 50% improvement from the procedure in regards to her right-sided low back/buttock pain and right lower extremity radiculopathy  Her pain score varies from 0 to a 4  The pain is worse again at night  The pain is intermittent  The pain is sharp in the buttock area and tingling with pins and needles in the right foot  She reports increased symptoms with carrying anything with weight  She also reports increased symptoms with sitting on soft surfaces, kneeling, and some exercises  She has been trying to exhibit activity restriction  She also notes increased symptoms with lumbar extension exercises  Other than as stated above, the patient denies any interval changes in medications, medical condition, mental condition, symptoms, or allergies since the last office visit  Review of Systems:    Review of Systems   Respiratory: Negative for shortness of breath  Cardiovascular: Negative for chest pain  Gastrointestinal: Negative for constipation, diarrhea, nausea and vomiting  Musculoskeletal: Negative for arthralgias, gait problem, joint swelling and myalgias  Skin: Negative for rash  Neurological: Negative for dizziness, seizures and weakness  All other systems reviewed and are negative          Patient Active Problem List   Diagnosis    Vaginal atrophy    Encounter for annual routine gynecological examination    Lower back pain    Facet arthropathy, lumbar       Past Medical History:   Diagnosis Date    Disease of thyroid gland     High cholesterol     Osteopenia     Peroneal tendonitis        Past Surgical History:   Procedure Laterality Date    COLON MANOMETRY         Family History   Problem Relation Age of Onset    Lymphoma Mother     Ankylosing spondylitis Father     Arthritis Father     Lung cancer Father     Diabetes Paternal Grandmother     Heart disease Paternal Grandfather     Hypertension Paternal Grandfather     No Known Problems Sister     Breast cancer Paternal Aunt     Crohn's disease Cousin        Social History     Occupational History    Not on file   Tobacco Use    Smoking status: Never Smoker    Smokeless tobacco: Never Used   Substance and Sexual Activity    Alcohol use: Yes     Frequency: Monthly or less     Drinks per session: 1 or 2     Comment: very occ    Drug use: Never    Sexual activity: Yes     Partners: Male     Birth control/protection: Post-menopausal     Comment: pt declines hiv std testing         Current Outpatient Medications:     atorvastatin (LIPITOR) 10 mg tablet, TAKE 1 TABLET BY MOUTH EVERY DAY AT NIGHT, Disp: , Rfl: 1    Calcium 200 MG TABS, Take by mouth daily, Disp: , Rfl:     cycloSPORINE (RESTASIS) 0 05 % ophthalmic emulsion, Administer 1 drop to both eyes every 12 (twelve) hours , Disp: , Rfl:     levothyroxine 100 mcg tablet, Take 100 mcg by mouth daily, Disp: , Rfl: 1    multivitamin (THERAGRAN) TABS, Take 1 tablet by mouth daily, Disp: , Rfl:     Omega-3 Fatty Acids (FISH OIL) 1,000 mg, Take 1 capsule by mouth daily, Disp: , Rfl:     Vitamin D, Cholecalciferol, 1000 units CAPS, Take 5,000 Units by mouth daily, Disp: , Rfl:     No Known Allergies    Physical Exam:    /66   Pulse 60   Resp 16   Ht 5' 5" (1 651 m)   Wt 67 kg (147 lb 9 6 oz)   BMI 24 56 kg/m²     Constitutional:normal, well developed, well nourished, alert, in no distress and non-toxic and no overt pain behavior    Eyes:anicteric  HEENT:grossly intact  Neck:supple, symmetric, trachea midline and no masses   Pulmonary:even and unlabored  Cardiovascular:No edema or pitting edema present  Skin:Normal without rashes or lesions and well hydrated  Psychiatric:Mood and affect appropriate  Neurologic:Cranial Nerves II-XII grossly intact  Musculoskeletal:normal      Imaging  FL spine and pain procedure    (Results Pending)         Orders Placed This Encounter   Procedures    FL spine and pain procedure

## 2021-02-09 ENCOUNTER — HOSPITAL ENCOUNTER (OUTPATIENT)
Dept: RADIOLOGY | Facility: CLINIC | Age: 66
Discharge: HOME | End: 2021-02-09
Attending: STUDENT IN AN ORGANIZED HEALTH CARE EDUCATION/TRAINING PROGRAM | Admitting: STUDENT IN AN ORGANIZED HEALTH CARE EDUCATION/TRAINING PROGRAM
Payer: MEDICARE

## 2021-02-09 VITALS
HEART RATE: 66 BPM | TEMPERATURE: 96 F | SYSTOLIC BLOOD PRESSURE: 117 MMHG | RESPIRATION RATE: 20 BRPM | DIASTOLIC BLOOD PRESSURE: 72 MMHG | OXYGEN SATURATION: 99 %

## 2021-02-09 DIAGNOSIS — M47.816 LUMBAR FACET ARTHROPATHY: ICD-10-CM

## 2021-02-09 DIAGNOSIS — M54.16 LUMBAR RADICULOPATHY: ICD-10-CM

## 2021-02-09 PROCEDURE — 62323 NJX INTERLAMINAR LMBR/SAC: CPT | Performed by: STUDENT IN AN ORGANIZED HEALTH CARE EDUCATION/TRAINING PROGRAM

## 2021-02-09 RX ORDER — METHYLPREDNISOLONE ACETATE 80 MG/ML
80 INJECTION, SUSPENSION INTRA-ARTICULAR; INTRALESIONAL; INTRAMUSCULAR; PARENTERAL; SOFT TISSUE ONCE
Status: COMPLETED | OUTPATIENT
Start: 2021-02-09 | End: 2021-02-09

## 2021-02-09 RX ORDER — LIDOCAINE HYDROCHLORIDE 10 MG/ML
5 INJECTION, SOLUTION EPIDURAL; INFILTRATION; INTRACAUDAL; PERINEURAL ONCE
Status: COMPLETED | OUTPATIENT
Start: 2021-02-09 | End: 2021-02-09

## 2021-02-09 RX ORDER — 0.9 % SODIUM CHLORIDE 0.9 %
4 VIAL (ML) INJECTION ONCE
Status: COMPLETED | OUTPATIENT
Start: 2021-02-09 | End: 2021-02-09

## 2021-02-09 RX ADMIN — IOHEXOL 1 ML: 300 INJECTION, SOLUTION INTRAVENOUS at 10:04

## 2021-02-09 RX ADMIN — LIDOCAINE HYDROCHLORIDE 2 ML: 10 INJECTION, SOLUTION EPIDURAL; INFILTRATION; INTRACAUDAL; PERINEURAL at 10:03

## 2021-02-09 RX ADMIN — SODIUM CHLORIDE 4 ML: 9 INJECTION INTRAMUSCULAR; INTRAVENOUS; SUBCUTANEOUS at 10:05

## 2021-02-09 RX ADMIN — METHYLPREDNISOLONE ACETATE 80 MG: 80 INJECTION, SUSPENSION INTRA-ARTICULAR; INTRALESIONAL; INTRAMUSCULAR; PARENTERAL; SOFT TISSUE at 10:05

## 2021-02-09 NOTE — INTERVAL H&P NOTE
Update: (This section must be completed if the H&P was completed greater than 24 hrs to procedure or admission)    H&P reviewed  After examining the patient, I find no changed to the H&P since it had been written  Patient re-evaluated   Accept as history and physical     Bee Eric MD/February 9, 2021/9:50 AM

## 2021-02-09 NOTE — DISCHARGE INSTR - LAB
Epidural Steroid Injection   WHAT YOU NEED TO KNOW:   An epidural steroid injection (SHEA) is a procedure to inject steroid medicine into the epidural space  The epidural space is between your spinal cord and vertebrae  Steroids reduce inflammation and fluid buildup in your spine that may be causing pain  You may be given pain medicine along with the steroids  ACTIVITY  · Do not drive or operate machinery today  · No strenuous activity today - bending, lifting, etc   · You may resume normal activites starting tomorrow - start slowly and as tolerated  · You may shower today, but no tub baths or hot tubs  · You may have numbness for several hours from the local anesthetic  Please use caution and common sense, especially with weight-bearing activities  CARE OF THE INJECTION SITE  · If you have soreness or pain, apply ice to the area today (20 minutes on/20 minutes off)  · Starting tomorrow, you may use warm, moist heat or ice if needed  · You may have an increase or change in your discomfort for 36-48 hours after your treatment  · Apply ice and continue with any pain medication you have been prescribed  · Notify [facility] if you have any of the following: redness, drainage, swelling, headache, stiff neck or fever above 100°F     SPECIAL INSTRUCTIONS  · Our office will contact you in approximately 7 days for a progress report  MEDICATIONS  · Continue to take all routine medications  · Our office may have instructed you to hold some medications  If you have a problem specifically related to your procedure, please call our office at [phone]  Problems not related to your procedure should be directed to your primary care physician

## 2021-02-16 ENCOUNTER — TELEPHONE (OUTPATIENT)
Dept: PAIN MEDICINE | Facility: CLINIC | Age: 66
End: 2021-02-16

## 2021-03-10 DIAGNOSIS — Z23 ENCOUNTER FOR IMMUNIZATION: ICD-10-CM

## 2021-03-29 ENCOUNTER — TELEPHONE (OUTPATIENT)
Dept: NEUROSURGERY | Facility: CLINIC | Age: 66
End: 2021-03-29

## 2021-03-29 NOTE — TELEPHONE ENCOUNTER
Patient called [facility]sel[facility] requesting an appt with Dr Ashley Nunez d/t worsening symptoms that are no longer relieved by her injections  Returned call to patient, offered and scheduled patient with Dr Ashley Nunez  Patient appreciative of call back

## 2021-04-20 ENCOUNTER — OFFICE VISIT (OUTPATIENT)
Dept: NEUROSURGERY | Facility: CLINIC | Age: 66
End: 2021-04-20
Payer: MEDICARE

## 2021-04-20 VITALS
DIASTOLIC BLOOD PRESSURE: 76 MMHG | HEART RATE: 60 BPM | WEIGHT: 146.8 LBS | HEIGHT: 65 IN | BODY MASS INDEX: 24.46 KG/M2 | TEMPERATURE: 97.2 F | RESPIRATION RATE: 16 BRPM | SYSTOLIC BLOOD PRESSURE: 114 MMHG

## 2021-04-20 DIAGNOSIS — M48.061 STENOSIS OF LATERAL RECESS OF LUMBAR SPINE: ICD-10-CM

## 2021-04-20 DIAGNOSIS — M54.16 LUMBAR RADICULOPATHY: ICD-10-CM

## 2021-04-20 DIAGNOSIS — M47.816 FACET ARTHROPATHY, LUMBAR: Primary | ICD-10-CM

## 2021-04-20 DIAGNOSIS — M43.10 SPONDYLOLISTHESIS: ICD-10-CM

## 2021-04-20 PROCEDURE — 99213 OFFICE O/P EST LOW 20 MIN: CPT | Performed by: NEUROLOGICAL SURGERY

## 2021-04-20 RX ORDER — CHLORHEXIDINE GLUCONATE 0.12 MG/ML
15 RINSE ORAL ONCE
Status: CANCELLED | OUTPATIENT
Start: 2021-04-20 | End: 2021-04-20

## 2021-04-20 RX ORDER — IBUPROFEN 600 MG/1
600 TABLET ORAL 4 TIMES DAILY PRN
COMMUNITY
Start: 2021-03-11 | End: 2021-06-03

## 2021-04-20 RX ORDER — CEFAZOLIN SODIUM 1 G/50ML
1000 SOLUTION INTRAVENOUS ONCE
Status: CANCELLED | OUTPATIENT
Start: 2021-04-20 | End: 2021-04-20

## 2021-04-20 RX ORDER — AMOXICILLIN 875 MG/1
875 TABLET, COATED ORAL EVERY 12 HOURS
COMMUNITY
Start: 2021-03-11 | End: 2021-06-03

## 2021-04-20 RX ORDER — CHLORHEXIDINE GLUCONATE 0.12 MG/ML
RINSE ORAL
COMMUNITY
Start: 2021-03-11 | End: 2021-06-03
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
## 2021-05-03 ENCOUNTER — TELEPHONE (OUTPATIENT)
Dept: NEUROSURGERY | Facility: CLINIC | Age: 66
End: 2021-05-03

## 2021-05-03 NOTE — TELEPHONE ENCOUNTER
Patient called nurseline questioning if she can get her pre surgery labs drawn or if it's too early  Advised patient may get her labs drawn as long as they are within 60 days of her surgery  Patient appreciative of call back

## 2021-05-14 ENCOUNTER — TELEPHONE (OUTPATIENT)
Dept: NEUROSURGERY | Facility: CLINIC | Age: 66
End: 2021-05-14

## 2021-05-14 NOTE — TELEPHONE ENCOUNTER
Received a call from [facility] with questions regarding the recovery time following her surgery  Advised that she will have activity restrictions for the first 6 weeks following the surgery  Will likely need help with ADLs for the first few days after surgery  Should limit bending and twisting and not lift >5-10 pounds for 6 weeks  Advised nerve irritation can occur following surgery and will take 12-18 months before new baseline is realized  She stated and understanding and was appreciative

## 2021-05-24 ENCOUNTER — APPOINTMENT (OUTPATIENT)
Dept: LAB | Facility: CLINIC | Age: 66
End: 2021-05-24
Payer: MEDICARE

## 2021-05-24 ENCOUNTER — TRANSCRIBE ORDERS (OUTPATIENT)
Dept: LAB | Facility: CLINIC | Age: 66
End: 2021-05-24

## 2021-05-24 ENCOUNTER — HOSPITAL ENCOUNTER (OUTPATIENT)
Dept: NON INVASIVE DIAGNOSTICS | Facility: CLINIC | Age: 66
Discharge: HOME | End: 2021-05-24
Payer: MEDICARE

## 2021-05-24 ENCOUNTER — LAB REQUISITION (OUTPATIENT)
Dept: LAB | Facility: HOSPITAL | Age: 66
End: 2021-05-24
Payer: MEDICARE

## 2021-05-24 DIAGNOSIS — M43.10 SPONDYLOLISTHESIS, SITE UNSPECIFIED: ICD-10-CM

## 2021-05-24 DIAGNOSIS — M54.16 RADICULOPATHY, LUMBAR REGION: ICD-10-CM

## 2021-05-24 DIAGNOSIS — M54.16 LUMBAR RADICULOPATHY: ICD-10-CM

## 2021-05-24 DIAGNOSIS — M48.061 SPINAL STENOSIS, LUMBAR REGION WITHOUT NEUROGENIC CLAUDICATION: ICD-10-CM

## 2021-05-24 DIAGNOSIS — M43.10 SPONDYLOLISTHESIS: ICD-10-CM

## 2021-05-24 DIAGNOSIS — M47.816 FACET ARTHROPATHY, LUMBAR: ICD-10-CM

## 2021-05-24 DIAGNOSIS — M48.061 STENOSIS OF LATERAL RECESS OF LUMBAR SPINE: ICD-10-CM

## 2021-05-24 DIAGNOSIS — M47.816 SPONDYLOSIS WITHOUT MYELOPATHY OR RADICULOPATHY, LUMBAR REGION: ICD-10-CM

## 2021-05-24 DIAGNOSIS — M47.816 LUMBAR FACET ARTHROPATHY: Primary | ICD-10-CM

## 2021-05-24 DIAGNOSIS — M43.10 SPONDYLOLISTHESIS, UNSPECIFIED SPINAL REGION: ICD-10-CM

## 2021-05-24 LAB
ABO GROUP BLD: NORMAL
ALBUMIN SERPL BCP-MCNC: 3.6 G/DL (ref 3.5–5)
ALP SERPL-CCNC: 85 U/L (ref 46–116)
ALT SERPL W P-5'-P-CCNC: 26 U/L (ref 12–78)
ANION GAP SERPL CALCULATED.3IONS-SCNC: 1 MMOL/L (ref 4–13)
APTT PPP: 29 SECONDS (ref 23–37)
AST SERPL W P-5'-P-CCNC: 20 U/L (ref 5–45)
ATRIAL RATE: 51 BPM
BASOPHILS # BLD AUTO: 0.05 THOUSANDS/ΜL (ref 0–0.1)
BASOPHILS NFR BLD AUTO: 1 % (ref 0–1)
BILIRUB SERPL-MCNC: 0.62 MG/DL (ref 0.2–1)
BILIRUB UR QL STRIP: NEGATIVE
BLD GP AB SCN SERPL QL: NEGATIVE
BUN SERPL-MCNC: 15 MG/DL (ref 5–25)
CALCIUM SERPL-MCNC: 9.6 MG/DL (ref 8.3–10.1)
CHLORIDE SERPL-SCNC: 108 MMOL/L (ref 100–108)
CLARITY UR: CLEAR
CO2 SERPL-SCNC: 32 MMOL/L (ref 21–32)
COLOR UR: YELLOW
CREAT SERPL-MCNC: 1 MG/DL (ref 0.6–1.3)
EOSINOPHIL # BLD AUTO: 0.14 THOUSAND/ΜL (ref 0–0.61)
EOSINOPHIL NFR BLD AUTO: 3 % (ref 0–6)
ERYTHROCYTE [DISTWIDTH] IN BLOOD BY AUTOMATED COUNT: 11.8 % (ref 11.6–15.1)
EST. AVERAGE GLUCOSE BLD GHB EST-MCNC: 108 MG/DL
GFR SERPL CREATININE-BSD FRML MDRD: 59 ML/MIN/1.73SQ M
GLUCOSE P FAST SERPL-MCNC: 86 MG/DL (ref 65–99)
GLUCOSE UR STRIP-MCNC: NEGATIVE MG/DL
HBA1C MFR BLD: 5.4 %
HCT VFR BLD AUTO: 41.7 % (ref 34.8–46.1)
HGB BLD-MCNC: 13.4 G/DL (ref 11.5–15.4)
HGB UR QL STRIP.AUTO: NEGATIVE
IMM GRANULOCYTES # BLD AUTO: 0.01 THOUSAND/UL (ref 0–0.2)
IMM GRANULOCYTES NFR BLD AUTO: 0 % (ref 0–2)
INR PPP: 0.91 (ref 0.84–1.19)
KETONES UR STRIP-MCNC: NEGATIVE MG/DL
LEUKOCYTE ESTERASE UR QL STRIP: NEGATIVE
LYMPHOCYTES # BLD AUTO: 2.24 THOUSANDS/ΜL (ref 0.6–4.47)
LYMPHOCYTES NFR BLD AUTO: 44 % (ref 14–44)
MCH RBC QN AUTO: 29.1 PG (ref 26.8–34.3)
MCHC RBC AUTO-ENTMCNC: 32.1 G/DL (ref 31.4–37.4)
MCV RBC AUTO: 91 FL (ref 82–98)
MONOCYTES # BLD AUTO: 0.54 THOUSAND/ΜL (ref 0.17–1.22)
MONOCYTES NFR BLD AUTO: 11 % (ref 4–12)
NEUTROPHILS # BLD AUTO: 2.06 THOUSANDS/ΜL (ref 1.85–7.62)
NEUTS SEG NFR BLD AUTO: 41 % (ref 43–75)
NITRITE UR QL STRIP: NEGATIVE
NRBC BLD AUTO-RTO: 0 /100 WBCS
P AXIS: 66 DEGREES
PH UR STRIP.AUTO: 6 [PH]
PLATELET # BLD AUTO: 280 THOUSANDS/UL (ref 149–390)
PMV BLD AUTO: 9.9 FL (ref 8.9–12.7)
POTASSIUM SERPL-SCNC: 4.6 MMOL/L (ref 3.5–5.3)
PR INTERVAL: 164 MS
PROT SERPL-MCNC: 7.3 G/DL (ref 6.4–8.2)
PROT UR STRIP-MCNC: NEGATIVE MG/DL
PROTHROMBIN TIME: 12.3 SECONDS (ref 11.6–14.5)
QRS AXIS: 90 DEGREES
QRSD INTERVAL: 88 MS
QT INTERVAL: 452 MS
QTC INTERVAL: 416 MS
RBC # BLD AUTO: 4.6 MILLION/UL (ref 3.81–5.12)
RH BLD: POSITIVE
SODIUM SERPL-SCNC: 141 MMOL/L (ref 136–145)
SP GR UR STRIP.AUTO: 1.02 (ref 1–1.03)
SPECIMEN EXPIRATION DATE: NORMAL
T WAVE AXIS: 63 DEGREES
UROBILINOGEN UR QL STRIP.AUTO: 0.2 E.U./DL
VENTRICULAR RATE: 51 BPM
WBC # BLD AUTO: 5.04 THOUSAND/UL (ref 4.31–10.16)

## 2021-05-24 PROCEDURE — 86900 BLOOD TYPING SEROLOGIC ABO: CPT | Performed by: NEUROLOGICAL SURGERY

## 2021-05-24 PROCEDURE — 85610 PROTHROMBIN TIME: CPT

## 2021-05-24 PROCEDURE — 81003 URINALYSIS AUTO W/O SCOPE: CPT | Performed by: NEUROLOGICAL SURGERY

## 2021-05-24 PROCEDURE — 86850 RBC ANTIBODY SCREEN: CPT | Performed by: NEUROLOGICAL SURGERY

## 2021-05-24 PROCEDURE — 85730 THROMBOPLASTIN TIME PARTIAL: CPT

## 2021-05-24 PROCEDURE — 86901 BLOOD TYPING SEROLOGIC RH(D): CPT | Performed by: NEUROLOGICAL SURGERY

## 2021-05-24 PROCEDURE — 36415 COLL VENOUS BLD VENIPUNCTURE: CPT

## 2021-05-24 PROCEDURE — 83036 HEMOGLOBIN GLYCOSYLATED A1C: CPT

## 2021-05-24 PROCEDURE — 87081 CULTURE SCREEN ONLY: CPT

## 2021-05-24 PROCEDURE — 85025 COMPLETE CBC W/AUTO DIFF WBC: CPT

## 2021-05-24 PROCEDURE — 93010 ELECTROCARDIOGRAM REPORT: CPT | Performed by: INTERNAL MEDICINE

## 2021-05-24 PROCEDURE — 80053 COMPREHEN METABOLIC PANEL: CPT

## 2021-05-24 PROCEDURE — 93005 ELECTROCARDIOGRAM TRACING: CPT | Performed by: NEUROLOGICAL SURGERY

## 2021-05-25 LAB — MRSA NOSE QL CULT: NORMAL

## 2021-06-03 NOTE — PRE-PROCEDURE INSTRUCTIONS
Pre-Surgery Instructions:   Medication Instructions    cycloSPORINE (RESTASIS) 0 05 % ophthalmic emulsion Instructed patient per Anesthesia Guidelines  May use 6/7    levothyroxine 100 mcg tablet Instructed patient per Anesthesia Guidelines  Take 6/7   Pre Procedure instructions given and verbalized understanding  NPO after MN  Morning med with water  No NSAIDS  Follow bathing instructions from office  All questions answered

## 2021-06-04 ENCOUNTER — DOCUMENTATION (OUTPATIENT)
Dept: NEUROSURGERY | Facility: CLINIC | Age: 66
End: 2021-06-04

## 2021-06-06 ENCOUNTER — ANESTHESIA EVENT (OUTPATIENT)
Dept: PERIOP | Facility: HOSPITAL | Age: 66
DRG: 460 | End: 2021-06-06
Payer: MEDICARE

## 2021-06-06 NOTE — ANESTHESIA PREPROCEDURE EVALUATION
Procedure:  Right L4-5 transverse lumbar interbody fusion and decompression (Right Spine Lumbar)    Relevant Problems   MUSCULOSKELETAL   (+) Lower back pain      Other   (+) Facet arthropathy, lumbar   (+) Stenosis of lateral recess of lumbar spine      Hypothyroid  HLD      Physical Exam    Airway    Mallampati score: II  TM Distance: >3 FB  Neck ROM: full     Dental   upper dentures,     Cardiovascular      Pulmonary      Other Findings        Anesthesia Plan  ASA Score- 2     Anesthesia Type- general with ASA Monitors  Additional Monitors:   Airway Plan: ETT  Comment: GA with ETT, IV, antiemetics, ? [place], SSEP/MEP monitoring    Plan Factors-    Chart reviewed  EKG reviewed  Existing labs reviewed  Patient summary reviewed  Patient is not a current smoker  Patient did not smoke on day of surgery  Induction- intravenous  Postoperative Plan-   Planned trial extubation    Informed Consent- Anesthetic plan and risks discussed with patient  I personally reviewed this patient with the CRNA  Discussed and agreed on the Anesthesia Plan with the CRNA  Skinny Wetzel

## 2021-06-07 ENCOUNTER — ANESTHESIA (OUTPATIENT)
Dept: PERIOP | Facility: HOSPITAL | Age: 66
DRG: 460 | End: 2021-06-07
Payer: MEDICARE

## 2021-06-07 ENCOUNTER — APPOINTMENT (OUTPATIENT)
Dept: RADIOLOGY | Facility: HOSPITAL | Age: 66
DRG: 460 | End: 2021-06-07
Payer: MEDICARE

## 2021-06-07 ENCOUNTER — HOSPITAL ENCOUNTER (INPATIENT)
Facility: HOSPITAL | Age: 66
LOS: 2 days | Discharge: HOME | DRG: 460 | End: 2021-06-10
Attending: NEUROLOGICAL SURGERY | Admitting: NEUROLOGICAL SURGERY
Payer: MEDICARE

## 2021-06-07 DIAGNOSIS — Z98.890 POST-OPERATIVE STATE: Primary | ICD-10-CM

## 2021-06-07 LAB
ABO GROUP BLD: NORMAL
RH BLD: POSITIVE

## 2021-06-07 PROCEDURE — C1713 ANCHOR/SCREW BN/BN,TIS/BN: HCPCS | Performed by: NEUROLOGICAL SURGERY

## 2021-06-07 PROCEDURE — C1769 GUIDE WIRE: HCPCS | Performed by: NEUROLOGICAL SURGERY

## 2021-06-07 PROCEDURE — 22840 INSERT SPINE FIXATION DEVICE: CPT | Performed by: NEUROLOGICAL SURGERY

## 2021-06-07 PROCEDURE — 22853 INSJ BIOMECHANICAL DEVICE: CPT | Performed by: NEUROLOGICAL SURGERY

## 2021-06-07 PROCEDURE — 0ST20ZZ RESECTION OF LUMBAR VERTEBRAL DISC, OPEN APPROACH: ICD-10-PCS | Performed by: NEUROLOGICAL SURGERY

## 2021-06-07 PROCEDURE — 22633 ARTHRD CMBN 1NTRSPC LUMBAR: CPT | Performed by: NEUROLOGICAL SURGERY

## 2021-06-07 PROCEDURE — 0SG00AJ FUSION OF LUMBAR VERTEBRAL JOINT WITH INTERBODY FUSION DEVICE, POSTERIOR APPROACH, ANTERIOR COLUMN, OPEN APPROACH: ICD-10-PCS | Performed by: NEUROLOGICAL SURGERY

## 2021-06-07 PROCEDURE — NC001 PR NO CHARGE: Performed by: NEUROLOGICAL SURGERY

## 2021-06-07 PROCEDURE — 72100 X-RAY EXAM L-S SPINE 2/3 VWS: CPT

## 2021-06-07 PROCEDURE — 20930 SP BONE ALGRFT MORSEL ADD-ON: CPT | Performed by: NEUROLOGICAL SURGERY

## 2021-06-07 DEVICE — MAS 54850015545 4.75 EXT TAB CANN 5.5X45
Type: IMPLANTABLE DEVICE | Status: FUNCTIONAL
Brand: CD HORIZON® SOLERA® SPINAL SYSTEM

## 2021-06-07 DEVICE — SPACER 7770923 ELEVATE STD 23X9MM
Type: IMPLANTABLE DEVICE | Status: FUNCTIONAL
Brand: ELEVATE™ SPINAL SYSTEM

## 2021-06-07 DEVICE — DBM T45005 5CC PASTE GRAFTON PLUS
Type: IMPLANTABLE DEVICE | Status: FUNCTIONAL
Brand: GRAFTON®AND GRAFTON PLUS®DEMINERALIZED BONE MATRIX (DBM)

## 2021-06-07 DEVICE — ROD 641003040 40MM CAPPED ROD 4.75MM CCM
Type: IMPLANTABLE DEVICE | Status: FUNCTIONAL
Brand: CD HORIZON® SOLERA® SPINAL SYSTEM

## 2021-06-07 DEVICE — SCREW SET 4.75MM SOLERA PERC: Type: IMPLANTABLE DEVICE | Status: FUNCTIONAL

## 2021-06-07 RX ORDER — LEVOTHYROXINE SODIUM 0.1 MG/1
100 TABLET ORAL
Status: DISCONTINUED | OUTPATIENT
Start: 2021-06-08 | End: 2021-06-10 | Stop reason: HOSPADM

## 2021-06-07 RX ORDER — ONDANSETRON 2 MG/ML
4 INJECTION INTRAMUSCULAR; INTRAVENOUS EVERY 6 HOURS PRN
Status: DISCONTINUED | OUTPATIENT
Start: 2021-06-07 | End: 2021-06-10 | Stop reason: HOSPADM

## 2021-06-07 RX ORDER — PROPOFOL 10 MG/ML
INJECTION, EMULSION INTRAVENOUS AS NEEDED
Status: DISCONTINUED | OUTPATIENT
Start: 2021-06-07 | End: 2021-06-07

## 2021-06-07 RX ORDER — EPHEDRINE SULFATE 50 MG/ML
INJECTION INTRAVENOUS AS NEEDED
Status: DISCONTINUED | OUTPATIENT
Start: 2021-06-07 | End: 2021-06-07

## 2021-06-07 RX ORDER — ATORVASTATIN CALCIUM 10 MG/1
10 TABLET, FILM COATED ORAL
Status: DISCONTINUED | OUTPATIENT
Start: 2021-06-08 | End: 2021-06-10 | Stop reason: HOSPADM

## 2021-06-07 RX ORDER — CEFAZOLIN SODIUM 1 G/50ML
1000 SOLUTION INTRAVENOUS ONCE
Status: COMPLETED | OUTPATIENT
Start: 2021-06-07 | End: 2021-06-07

## 2021-06-07 RX ORDER — HYDROMORPHONE HCL/PF 1 MG/ML
0.5 SYRINGE (ML) INJECTION
Status: DISCONTINUED | OUTPATIENT
Start: 2021-06-07 | End: 2021-06-09

## 2021-06-07 RX ORDER — CHLORHEXIDINE GLUCONATE 0.12 MG/ML
15 RINSE ORAL ONCE
Status: DISCONTINUED | OUTPATIENT
Start: 2021-06-07 | End: 2021-06-07 | Stop reason: HOSPADM

## 2021-06-07 RX ORDER — HEPARIN SODIUM 5000 [USP'U]/ML
5000 INJECTION, SOLUTION INTRAVENOUS; SUBCUTANEOUS EVERY 8 HOURS SCHEDULED
Status: DISCONTINUED | OUTPATIENT
Start: 2021-06-08 | End: 2021-06-10 | Stop reason: HOSPADM

## 2021-06-07 RX ORDER — CEFAZOLIN SODIUM 1 G/50ML
1000 SOLUTION INTRAVENOUS EVERY 8 HOURS
Status: COMPLETED | OUTPATIENT
Start: 2021-06-07 | End: 2021-06-08

## 2021-06-07 RX ORDER — SUCCINYLCHOLINE/SOD CL,ISO/PF 100 MG/5ML
SYRINGE (ML) INTRAVENOUS AS NEEDED
Status: DISCONTINUED | OUTPATIENT
Start: 2021-06-07 | End: 2021-06-07

## 2021-06-07 RX ORDER — SODIUM CHLORIDE, SODIUM LACTATE, POTASSIUM CHLORIDE, CALCIUM CHLORIDE 600; 310; 30; 20 MG/100ML; MG/100ML; MG/100ML; MG/100ML
75 INJECTION, SOLUTION INTRAVENOUS CONTINUOUS
Status: DISCONTINUED | OUTPATIENT
Start: 2021-06-07 | End: 2021-06-08

## 2021-06-07 RX ORDER — OXYCODONE HYDROCHLORIDE 5 MG/1
5 TABLET ORAL EVERY 4 HOURS PRN
Status: DISCONTINUED | OUTPATIENT
Start: 2021-06-07 | End: 2021-06-10 | Stop reason: HOSPADM

## 2021-06-07 RX ORDER — ALBUMIN, HUMAN INJ 5% 5 %
SOLUTION INTRAVENOUS CONTINUOUS PRN
Status: DISCONTINUED | OUTPATIENT
Start: 2021-06-07 | End: 2021-06-07

## 2021-06-07 RX ORDER — SENNOSIDES 8.6 MG
1 TABLET ORAL DAILY
Status: DISCONTINUED | OUTPATIENT
Start: 2021-06-08 | End: 2021-06-10 | Stop reason: HOSPADM

## 2021-06-07 RX ORDER — METHOCARBAMOL 500 MG/1
500 TABLET, FILM COATED ORAL EVERY 6 HOURS SCHEDULED
Status: DISCONTINUED | OUTPATIENT
Start: 2021-06-07 | End: 2021-06-08

## 2021-06-07 RX ORDER — HYDROMORPHONE HCL/PF 1 MG/ML
SYRINGE (ML) INJECTION AS NEEDED
Status: DISCONTINUED | OUTPATIENT
Start: 2021-06-07 | End: 2021-06-07

## 2021-06-07 RX ORDER — SODIUM CHLORIDE, SODIUM LACTATE, POTASSIUM CHLORIDE, CALCIUM CHLORIDE 600; 310; 30; 20 MG/100ML; MG/100ML; MG/100ML; MG/100ML
INJECTION, SOLUTION INTRAVENOUS CONTINUOUS PRN
Status: DISCONTINUED | OUTPATIENT
Start: 2021-06-07 | End: 2021-06-07

## 2021-06-07 RX ORDER — DOCUSATE SODIUM 100 MG/1
100 CAPSULE, LIQUID FILLED ORAL 2 TIMES DAILY
Status: DISCONTINUED | OUTPATIENT
Start: 2021-06-07 | End: 2021-06-10 | Stop reason: HOSPADM

## 2021-06-07 RX ORDER — BUPIVACAINE HYDROCHLORIDE AND EPINEPHRINE 5; 5 MG/ML; UG/ML
INJECTION, SOLUTION EPIDURAL; INTRACAUDAL; PERINEURAL AS NEEDED
Status: DISCONTINUED | OUTPATIENT
Start: 2021-06-07 | End: 2021-06-07 | Stop reason: HOSPADM

## 2021-06-07 RX ORDER — FENTANYL CITRATE 50 UG/ML
INJECTION, SOLUTION INTRAMUSCULAR; INTRAVENOUS AS NEEDED
Status: DISCONTINUED | OUTPATIENT
Start: 2021-06-07 | End: 2021-06-07

## 2021-06-07 RX ORDER — DEXAMETHASONE SODIUM PHOSPHATE 4 MG/ML
INJECTION, SOLUTION INTRA-ARTICULAR; INTRALESIONAL; INTRAMUSCULAR; INTRAVENOUS; SOFT TISSUE AS NEEDED
Status: DISCONTINUED | OUTPATIENT
Start: 2021-06-07 | End: 2021-06-07

## 2021-06-07 RX ORDER — SODIUM CHLORIDE 9 MG/ML
INJECTION, SOLUTION INTRAVENOUS CONTINUOUS PRN
Status: DISCONTINUED | OUTPATIENT
Start: 2021-06-07 | End: 2021-06-07

## 2021-06-07 RX ORDER — HYDROMORPHONE HCL/PF 1 MG/ML
0.5 SYRINGE (ML) INJECTION
Status: DISCONTINUED | OUTPATIENT
Start: 2021-06-07 | End: 2021-06-07 | Stop reason: HOSPADM

## 2021-06-07 RX ORDER — OXYCODONE HYDROCHLORIDE 10 MG/1
10 TABLET ORAL EVERY 4 HOURS PRN
Status: DISCONTINUED | OUTPATIENT
Start: 2021-06-07 | End: 2021-06-10 | Stop reason: HOSPADM

## 2021-06-07 RX ORDER — KETAMINE HCL IN NACL, ISO-OSM 100MG/10ML
SYRINGE (ML) INJECTION AS NEEDED
Status: DISCONTINUED | OUTPATIENT
Start: 2021-06-07 | End: 2021-06-07

## 2021-06-07 RX ORDER — HYDROMORPHONE HCL/PF 1 MG/ML
0.5 SYRINGE (ML) INJECTION
Status: DISCONTINUED | OUTPATIENT
Start: 2021-06-07 | End: 2021-06-07

## 2021-06-07 RX ORDER — FENTANYL CITRATE/PF 50 MCG/ML
25 SYRINGE (ML) INJECTION
Status: DISCONTINUED | OUTPATIENT
Start: 2021-06-07 | End: 2021-06-07 | Stop reason: HOSPADM

## 2021-06-07 RX ORDER — ACETAMINOPHEN 325 MG/1
975 TABLET ORAL EVERY 8 HOURS SCHEDULED
Status: DISCONTINUED | OUTPATIENT
Start: 2021-06-07 | End: 2021-06-10 | Stop reason: HOSPADM

## 2021-06-07 RX ORDER — LIDOCAINE HYDROCHLORIDE AND EPINEPHRINE 10; 10 MG/ML; UG/ML
INJECTION, SOLUTION INFILTRATION; PERINEURAL AS NEEDED
Status: DISCONTINUED | OUTPATIENT
Start: 2021-06-07 | End: 2021-06-07 | Stop reason: HOSPADM

## 2021-06-07 RX ORDER — SODIUM CHLORIDE, SODIUM LACTATE, POTASSIUM CHLORIDE, CALCIUM CHLORIDE 600; 310; 30; 20 MG/100ML; MG/100ML; MG/100ML; MG/100ML
125 INJECTION, SOLUTION INTRAVENOUS CONTINUOUS
Status: DISCONTINUED | OUTPATIENT
Start: 2021-06-07 | End: 2021-06-08

## 2021-06-07 RX ORDER — PROPOFOL 10 MG/ML
INJECTION, EMULSION INTRAVENOUS CONTINUOUS PRN
Status: DISCONTINUED | OUTPATIENT
Start: 2021-06-07 | End: 2021-06-07

## 2021-06-07 RX ORDER — GLYCOPYRROLATE 0.2 MG/ML
INJECTION INTRAMUSCULAR; INTRAVENOUS AS NEEDED
Status: DISCONTINUED | OUTPATIENT
Start: 2021-06-07 | End: 2021-06-07

## 2021-06-07 RX ORDER — MIDAZOLAM HYDROCHLORIDE 2 MG/2ML
INJECTION, SOLUTION INTRAMUSCULAR; INTRAVENOUS AS NEEDED
Status: DISCONTINUED | OUTPATIENT
Start: 2021-06-07 | End: 2021-06-07

## 2021-06-07 RX ORDER — ONDANSETRON 2 MG/ML
INJECTION INTRAMUSCULAR; INTRAVENOUS AS NEEDED
Status: DISCONTINUED | OUTPATIENT
Start: 2021-06-07 | End: 2021-06-07

## 2021-06-07 RX ORDER — ACETAMINOPHEN 325 MG/1
975 TABLET ORAL ONCE
Status: COMPLETED | OUTPATIENT
Start: 2021-06-07 | End: 2021-06-07

## 2021-06-07 RX ORDER — LIDOCAINE HYDROCHLORIDE 10 MG/ML
INJECTION, SOLUTION EPIDURAL; INFILTRATION; INTRACAUDAL; PERINEURAL AS NEEDED
Status: DISCONTINUED | OUTPATIENT
Start: 2021-06-07 | End: 2021-06-07

## 2021-06-07 RX ADMIN — METHOCARBAMOL 500 MG: 500 TABLET ORAL at 20:10

## 2021-06-07 RX ADMIN — HYDROMORPHONE HYDROCHLORIDE 0.5 MG: 1 INJECTION, SOLUTION INTRAMUSCULAR; INTRAVENOUS; SUBCUTANEOUS at 18:44

## 2021-06-07 RX ADMIN — SODIUM CHLORIDE: 0.9 INJECTION, SOLUTION INTRAVENOUS at 14:48

## 2021-06-07 RX ADMIN — ACETAMINOPHEN 975 MG: 325 TABLET, FILM COATED ORAL at 21:58

## 2021-06-07 RX ADMIN — ALBUMIN (HUMAN): 12.5 INJECTION, SOLUTION INTRAVENOUS at 14:13

## 2021-06-07 RX ADMIN — HYDROMORPHONE HYDROCHLORIDE 0.5 MG: 1 INJECTION, SOLUTION INTRAMUSCULAR; INTRAVENOUS; SUBCUTANEOUS at 17:31

## 2021-06-07 RX ADMIN — EPHEDRINE SULFATE 10 MG: 50 INJECTION, SOLUTION INTRAVENOUS at 13:59

## 2021-06-07 RX ADMIN — Medication 25 MCG: at 17:22

## 2021-06-07 RX ADMIN — HYDROMORPHONE HYDROCHLORIDE 1 MG: 1 INJECTION, SOLUTION INTRAMUSCULAR; INTRAVENOUS; SUBCUTANEOUS at 14:32

## 2021-06-07 RX ADMIN — PROPOFOL 100 MCG/KG/MIN: 10 INJECTION, EMULSION INTRAVENOUS at 13:45

## 2021-06-07 RX ADMIN — PHENYLEPHRINE HYDROCHLORIDE 100 MCG: 10 INJECTION INTRAVENOUS at 13:53

## 2021-06-07 RX ADMIN — PHENYLEPHRINE HYDROCHLORIDE 200 MCG: 10 INJECTION INTRAVENOUS at 13:45

## 2021-06-07 RX ADMIN — SODIUM CHLORIDE, SODIUM LACTATE, POTASSIUM CHLORIDE, AND CALCIUM CHLORIDE 125 ML/HR: .6; .31; .03; .02 INJECTION, SOLUTION INTRAVENOUS at 13:13

## 2021-06-07 RX ADMIN — Medication 30 MG: at 14:32

## 2021-06-07 RX ADMIN — FENTANYL CITRATE 50 MCG: 50 INJECTION INTRAMUSCULAR; INTRAVENOUS at 13:41

## 2021-06-07 RX ADMIN — PROPOFOL 150 MG: 10 INJECTION, EMULSION INTRAVENOUS at 13:41

## 2021-06-07 RX ADMIN — GLYCOPYRROLATE 0.4 MG: 0.2 INJECTION, SOLUTION INTRAMUSCULAR; INTRAVENOUS at 14:12

## 2021-06-07 RX ADMIN — Medication 100 MG: at 13:41

## 2021-06-07 RX ADMIN — SODIUM CHLORIDE 0.4 MCG/KG/HR: 9 INJECTION, SOLUTION INTRAVENOUS at 14:46

## 2021-06-07 RX ADMIN — Medication 25 MCG: at 17:16

## 2021-06-07 RX ADMIN — MIDAZOLAM 2 MG: 1 INJECTION INTRAMUSCULAR; INTRAVENOUS at 14:53

## 2021-06-07 RX ADMIN — Medication 20 MG: at 14:20

## 2021-06-07 RX ADMIN — HYDROMORPHONE HYDROCHLORIDE 0.5 MG: 1 INJECTION, SOLUTION INTRAMUSCULAR; INTRAVENOUS; SUBCUTANEOUS at 17:47

## 2021-06-07 RX ADMIN — SODIUM CHLORIDE: 0.9 INJECTION, SOLUTION INTRAVENOUS at 13:44

## 2021-06-07 RX ADMIN — PHENYLEPHRINE HYDROCHLORIDE 100 MCG: 10 INJECTION INTRAVENOUS at 14:18

## 2021-06-07 RX ADMIN — DEXAMETHASONE SODIUM PHOSPHATE 10 MG: 4 INJECTION INTRA-ARTICULAR; INTRALESIONAL; INTRAMUSCULAR; INTRAVENOUS; SOFT TISSUE at 14:19

## 2021-06-07 RX ADMIN — CEFAZOLIN SODIUM 1000 MG: 1 SOLUTION INTRAVENOUS at 13:36

## 2021-06-07 RX ADMIN — ONDANSETRON 4 MG: 2 INJECTION INTRAMUSCULAR; INTRAVENOUS at 16:31

## 2021-06-07 RX ADMIN — ALBUMIN (HUMAN): 12.5 INJECTION, SOLUTION INTRAVENOUS at 14:00

## 2021-06-07 RX ADMIN — LIDOCAINE HYDROCHLORIDE 50 MG: 10 INJECTION, SOLUTION EPIDURAL; INFILTRATION; INTRACAUDAL; PERINEURAL at 13:41

## 2021-06-07 RX ADMIN — ACETAMINOPHEN 975 MG: 325 TABLET, FILM COATED ORAL at 09:05

## 2021-06-07 RX ADMIN — REMIFENTANIL HYDROCHLORIDE 0.1 MCG/KG/MIN: 1 INJECTION, POWDER, LYOPHILIZED, FOR SOLUTION INTRAVENOUS at 14:00

## 2021-06-07 RX ADMIN — SODIUM CHLORIDE, SODIUM LACTATE, POTASSIUM CHLORIDE, AND CALCIUM CHLORIDE: .6; .31; .03; .02 INJECTION, SOLUTION INTRAVENOUS at 13:36

## 2021-06-07 RX ADMIN — SODIUM CHLORIDE, SODIUM LACTATE, POTASSIUM CHLORIDE, AND CALCIUM CHLORIDE 75 ML/HR: .6; .31; .03; .02 INJECTION, SOLUTION INTRAVENOUS at 20:00

## 2021-06-07 RX ADMIN — PHENYLEPHRINE HYDROCHLORIDE 20 MCG/MIN: 10 INJECTION INTRAVENOUS at 14:10

## 2021-06-07 RX ADMIN — CEFAZOLIN SODIUM 1000 MG: 1 SOLUTION INTRAVENOUS at 21:58

## 2021-06-07 NOTE — ANESTHESIA POSTPROCEDURE EVALUATION
Post-Op Assessment Note    CV Status:  Stable  Pain Score: 4    Pain management: adequate     Mental Status:  Alert and awake   Hydration Status:  Euvolemic   PONV Controlled:  Controlled   Airway Patency:  Patent      Post Op Vitals Reviewed: Yes      Staff: CRNA         No complications documented      BP   108/66   Temp  98 0   Pulse 76   Resp 16   SpO2 99

## 2021-06-07 NOTE — OP NOTE
OPERATIVE REPORT  PATIENT NAME: Kathi Sharma    :  1955  MRN: [id number]  Pt Location: [facility] OR ROOM 09    SURGERY DATE: 2021    Surgeon(s) and Role:     * Criss Mitchell MD - Primary     * Tomasa Brown - Assisting    Preop Diagnosis:  Facet arthropathy, lumbar [M47 816]  Spondylolisthesis [M43 10]  Stenosis of lateral recess of lumbar spine [M48 061]  Lumbar radiculopathy [M54 16]    Post-Op Diagnosis Codes:     * Facet arthropathy, lumbar [M47 816]     * Spondylolisthesis [M43 10]     * Stenosis of lateral recess of lumbar spine [M48 061]     * Lumbar radiculopathy [M54 16]    Procedure(s) (LRB):  Right L4-5 transverse lumbar interbody fusion and decompression (N/A)    Specimen(s):  * No specimens in log *    Estimated Blood Loss:   100 mL    Drains:  [REMOVED] Urethral Catheter Latex 16 Fr  (Removed)   Collection Container Standard drainage bag 21 1400   Number of days: 0       Anesthesia Type:   General    Operative Indications:  Facet arthropathy, lumbar [M47 816]  Spondylolisthesis [M43 10]  Stenosis of lateral recess of lumbar spine [M48 061]  Lumbar radiculopathy [M54 16]      Operative Findings:  Facet arthrosis    Complications:   None    Procedure and Technique:  After adequate general endotracheal anesthesia the patient was placed prone on the operating room table  The back was prepped with Betadine soap then DuraPrep  Double layer drapes were placed in normal fashion and a Betadine impregnated sticky drape was placed over these  A time-out was called and all parameters a time-out were followed    The procedure began by imaging the projection of the pedicles onto the skin of the low back  In this region, a 30 mm incision was made  These were made in the pedicular line on both sides  K-wires were then introduced into the L4 and L5 pedicles utilizing a jam she needle  Each was tapped and stimulated and found to be within threshold    On the left side screws were introduced  Solara screws with a percutaneous extender utilizing a 5 5 by 45 mm screw were placed at L4 and L5  This was measured to be 35 mm and a 40 mm yeison was utilized  The screws were then distracted and the yeison was tightened into final position  Next attention was placed to the right side  Progressive dilators were placed on the left side and the quadrant retractor system was placed  This was angled medially and checked in the AP and lateral planes fluoroscopically  The intraoperative microscope was used at various degrees of magnification to aid in the identification, illumination, and microdissection necessary to perform this procedure  Next the pars interarticularis was drilled and a partial medial facetectomy was performed  The thecal sac was identified  There were no CSF leaks  The disc space was thus identified  The thecal sac was retracted minimally and a annual laterally was performed  A series of pituitary rongeurs Kerrison rongeurs curettes and rasps were used to perform a complete diskectomy  Demineralized bone matrix was then placed on the contralateral side and a 9 mm x 23 mm expandable cage was impacted into position without difficulty  This was expanded  Next by5 5 by 40mm Solara screw were placed on the right  A 40 mm yeison was placed and the caps were tightened to the break-off torque    The fascia was approximated with interrupted 2-0 Vicryl suture  The epidermis was approximated with interrupted inverted 3 0 Vicryl suture  Benzoin and Steri-Strips were placed  Clean sterile dressings were placed  Final x-rays were obtained  The instrumentation was in acceptable position  Patient was then taken to the recovery room having tolerated the procedure well     I was present for the entire procedure    Patient Disposition:  PACU     SIGNATURE: Mortimer Rush, MD  DATE: June 7, 2021  TIME: 5:11 PM
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
## 2021-06-08 ENCOUNTER — APPOINTMENT (INPATIENT)
Dept: RADIOLOGY | Facility: HOSPITAL | Age: 66
DRG: 460 | End: 2021-06-08
Payer: MEDICARE

## 2021-06-08 LAB
ANION GAP SERPL CALCULATED.3IONS-SCNC: 4 MMOL/L (ref 4–13)
BUN SERPL-MCNC: 12 MG/DL (ref 5–25)
CALCIUM SERPL-MCNC: 9.1 MG/DL (ref 8.3–10.1)
CHLORIDE SERPL-SCNC: 107 MMOL/L (ref 100–108)
CO2 SERPL-SCNC: 26 MMOL/L (ref 21–32)
CREAT SERPL-MCNC: 0.77 MG/DL (ref 0.6–1.3)
ERYTHROCYTE [DISTWIDTH] IN BLOOD BY AUTOMATED COUNT: 11.9 % (ref 11.6–15.1)
GFR SERPL CREATININE-BSD FRML MDRD: 81 ML/MIN/1.73SQ M
GLUCOSE SERPL-MCNC: 112 MG/DL (ref 65–140)
HCT VFR BLD AUTO: 31.8 % (ref 34.8–46.1)
HGB BLD-MCNC: 10.5 G/DL (ref 11.5–15.4)
MCH RBC QN AUTO: 29.2 PG (ref 26.8–34.3)
MCHC RBC AUTO-ENTMCNC: 33 G/DL (ref 31.4–37.4)
MCV RBC AUTO: 88 FL (ref 82–98)
PLATELET # BLD AUTO: 232 THOUSANDS/UL (ref 149–390)
PMV BLD AUTO: 9.6 FL (ref 8.9–12.7)
POTASSIUM SERPL-SCNC: 4.1 MMOL/L (ref 3.5–5.3)
RBC # BLD AUTO: 3.6 MILLION/UL (ref 3.81–5.12)
SODIUM SERPL-SCNC: 137 MMOL/L (ref 136–145)
WBC # BLD AUTO: 11.27 THOUSAND/UL (ref 4.31–10.16)

## 2021-06-08 PROCEDURE — 72100 X-RAY EXAM L-S SPINE 2/3 VWS: CPT

## 2021-06-08 PROCEDURE — 85027 COMPLETE CBC AUTOMATED: CPT | Performed by: PHYSICIAN ASSISTANT

## 2021-06-08 PROCEDURE — 97166 OT EVAL MOD COMPLEX 45 MIN: CPT

## 2021-06-08 PROCEDURE — 97163 PT EVAL HIGH COMPLEX 45 MIN: CPT

## 2021-06-08 PROCEDURE — 80048 BASIC METABOLIC PNL TOTAL CA: CPT | Performed by: PHYSICIAN ASSISTANT

## 2021-06-08 PROCEDURE — 99024 POSTOP FOLLOW-UP VISIT: CPT | Performed by: NURSE PRACTITIONER

## 2021-06-08 RX ORDER — GABAPENTIN 100 MG/1
100 CAPSULE ORAL 3 TIMES DAILY
Status: DISCONTINUED | OUTPATIENT
Start: 2021-06-08 | End: 2021-06-10 | Stop reason: HOSPADM

## 2021-06-08 RX ORDER — METHOCARBAMOL 750 MG/1
750 TABLET, FILM COATED ORAL EVERY 6 HOURS SCHEDULED
Status: DISCONTINUED | OUTPATIENT
Start: 2021-06-08 | End: 2021-06-10 | Stop reason: HOSPADM

## 2021-06-08 RX ADMIN — ATORVASTATIN CALCIUM 10 MG: 10 TABLET, FILM COATED ORAL at 17:35

## 2021-06-08 RX ADMIN — OXYCODONE HYDROCHLORIDE 5 MG: 5 TABLET ORAL at 09:15

## 2021-06-08 RX ADMIN — METHOCARBAMOL 500 MG: 500 TABLET ORAL at 08:34

## 2021-06-08 RX ADMIN — ACETAMINOPHEN 975 MG: 325 TABLET, FILM COATED ORAL at 05:50

## 2021-06-08 RX ADMIN — ONDANSETRON 4 MG: 2 INJECTION INTRAMUSCULAR; INTRAVENOUS at 21:06

## 2021-06-08 RX ADMIN — ACETAMINOPHEN 975 MG: 325 TABLET, FILM COATED ORAL at 14:00

## 2021-06-08 RX ADMIN — METHOCARBAMOL 500 MG: 500 TABLET ORAL at 01:36

## 2021-06-08 RX ADMIN — HEPARIN SODIUM 5000 UNITS: 5000 INJECTION INTRAVENOUS; SUBCUTANEOUS at 22:21

## 2021-06-08 RX ADMIN — LEVOTHYROXINE SODIUM 100 MCG: 100 TABLET ORAL at 05:51

## 2021-06-08 RX ADMIN — METHOCARBAMOL TABLETS 750 MG: 750 TABLET, COATED ORAL at 17:35

## 2021-06-08 RX ADMIN — DOCUSATE SODIUM 100 MG: 100 CAPSULE ORAL at 17:35

## 2021-06-08 RX ADMIN — CEFAZOLIN SODIUM 1000 MG: 1 SOLUTION INTRAVENOUS at 05:52

## 2021-06-08 RX ADMIN — GABAPENTIN 100 MG: 100 CAPSULE ORAL at 22:21

## 2021-06-08 RX ADMIN — GABAPENTIN 100 MG: 100 CAPSULE ORAL at 17:35

## 2021-06-08 RX ADMIN — ACETAMINOPHEN 975 MG: 325 TABLET, FILM COATED ORAL at 22:21

## 2021-06-08 RX ADMIN — HYDROMORPHONE HYDROCHLORIDE 0.5 MG: 1 INJECTION, SOLUTION INTRAMUSCULAR; INTRAVENOUS; SUBCUTANEOUS at 10:43

## 2021-06-08 RX ADMIN — HEPARIN SODIUM 5000 UNITS: 5000 INJECTION INTRAVENOUS; SUBCUTANEOUS at 14:01

## 2021-06-08 RX ADMIN — OXYCODONE HYDROCHLORIDE 10 MG: 10 TABLET ORAL at 14:01

## 2021-06-08 NOTE — PLAN OF CARE
Problem: PHYSICAL THERAPY ADULT  Goal: Performs mobility at highest level of function for planned discharge setting  See evaluation for individualized goals  Description: Treatment/Interventions: Functional transfer training, LE strengthening/ROM, Elevations, Therapeutic exercise, Endurance training, Patient/family training, Equipment eval/education, Bed mobility, Gait training, Spoke to nursing, OT  Equipment Recommended: Shelton Vivar, Other (Comment)([facility] )       See flowsheet documentation for full assessment, interventions and recommendations  Note: Prognosis: Good  Problem List: Decreased strength, Decreased endurance, Impaired balance, Decreased mobility, Pain, Orthopedic restrictions, Decreased range of motion  Assessment: Pt is 77 y o  female seen for PT evaluation s/p admit to [facility] on 6/7/2021  Two pt identifiers were used to confirm  Pt presented for scheduled Right L4-5 transverse lumbar interbody fusion and decompression (N/A) which was performed on 06/07/2021  Pt was admitted with a primary dx of:  Lumbar facet arthropathy s/p Right L4-5 transverse lumbar interbody fusion and decompression (N/A)  PT now consulted for assessment of mobility and d/c needs  Pts current co morbidities affecting treatment include:  Disease of thyroid gland, osteopenia, high cholesterol, and personal factors including steps to enter home  Pts current clinical presentation is Unstable/ Unpredictable (high complexity) due to Ongoing medical management for primary dx, Increased reliance on more restrictive AD compared to baseline, Decreased activity tolerance compared to baseline, Fall risk, Increased assistance needed from caregiver at current time, Spinal precautions at current time, Continuous pulse oximetry monitoring , s/p surgical intervention    Upon evaluation, pt currently is requiring Min Ax1 for bed mobility; Min Ax1 for transfers and Supervision for ambulation w/ RW     Patient denies any lightheadedness or dizziness with ambulation  Pt presents at PT eval functioning below baseline and currently w/ overall mobility deficits 2* to: BLE weakness, decreased ROM, impaired balance, decreased endurance, gait deviations, pain, decreased activity tolerance compared to baseline, fall risk, spinal precautions  Pt currently at a fall risk 2* to impairments listed above  Based on the aforementioned PT evaluation, pt will continue to benefit from skilled Acute PT interventions to address stated impairments; to maximize functional mobility; for ongoing pt/ family training; and DME needs  At conclusion of PT session pt returned back in chair with phone and call bell within reach  Pt denies any further questions at this time  PT is currently recommending Home with increased family support and Outpatient PT when appropriate  PT will continue to follow during hospital stay  Barriers to Discharge: None  Barriers to Discharge Comments: Patient denies any mobility or safety concerns about returning home at time of discharge  Pt denies concerns negotiating [facility] home      PT Discharge Recommendation: Home with outpatient rehabilitation(home with increased support, OPPT )     PT - OK to Discharge: Yes(When medically cleared)    See flowsheet documentation for full assessment

## 2021-06-08 NOTE — PHYSICAL THERAPY NOTE
PHYSICAL THERAPY EVALUATION  NAME:  Leon Schneider  DATE: 06/08/21    AGE:   77 y o  Mrn:   [id number]  ADMIT DX:  Facet arthropathy, lumbar [M47 816]  Spondylolisthesis [M43 10]  Stenosis of lateral recess of lumbar spine [M48 061]  Lumbar radiculopathy [M54 16]    Past Medical History:   Diagnosis Date    Disease of thyroid gland     High cholesterol     Osteopenia     Peroneal tendonitis        Past Surgical History:   Procedure Laterality Date    COLON MANOMETRY      COLONOSCOPY      DENTAL SURGERY      removal of broken tooth and implant to be done    DILATION AND CURETTAGE OF UTERUS      ECTOPIC PREGNANCY SURGERY      NASAL SEPTUM SURGERY      REFRACTIVE SURGERY      VAGINAL DELIVERY         Length Of Stay: 0    PHYSICAL THERAPY EVALUATION:        06/08/21 0935   Note Type   Note type Evaluation   Pain Assessment   Pain Assessment Tool 0-10   Pain Score 7   Pain Location/Orientation Location: Back   Pain Onset/Description Onset: Ongoing;Frequency: Constant/Continuous; Descriptor: Aching   Effect of Pain on Daily Activities Increased pain with activity    Patient's Stated Pain Goal No pain   Hospital Pain Intervention(s) Ambulation/increased activity;Repositioned   Home Living   Type of [address] [place] [address] Two level; Able to live on main level with bedroom/bathroom;Stairs to enter with rails  (2 YASIR, 2 steps to living room )   Home Equipment   (None as per patient)   Additional Comments Patient reports living supportive spouse to assist as needed   Prior Function   Level of [place] Independent with ADLs and functional mobility   Lives With Spouse   Receives Help From Family   ADL Assistance Independent   Falls in the last 6 months 0   Comments Patient denies use of an assistive device for ambulation prior to admission   Restrictions/Precautions   Weight Bearing Precautions Per Order No   Braces or Orthoses Other (Comment)  (No back brace required per Neurosurgery)   Other Precautions Pain;Multiple lines; Fall Risk;Spinal precautions   General   Family/Caregiver Present No   Cognition   Overall Cognitive Status WFL   Arousal/Participation Alert   Orientation Level Oriented X4   Memory Within functional limits   Following Commands Follows all commands and directions without difficulty   RUE Assessment   RUE Assessment WFL   LUE Assessment   LUE Assessment WFL   RLE Assessment   RLE Assessment WFL   Strength RLE   RLE Overall Strength 4-/5   LLE Assessment   LLE Assessment WFL   Strength LLE   LLE Overall Strength 4-/5   Bed Mobility   Rolling R 5  Supervision   Additional items Increased time required;Verbal cues   Supine to Sit 4  Minimal assistance   Additional items Assist x 1; Increased time required;Verbal cues   Transfers   Sit to Stand 4  Minimal assistance   Additional items Assist x 1; Increased time required;Verbal cues   Stand to Sit 4  Minimal assistance   Additional items Assist x 1; Increased time required;Verbal cues   Toilet transfer 4  Minimal assistance   Additional items Assist x 1; Increased time required   Additional Comments Cuing needed for hand placement during transfers   Ambulation/Elevation   Gait pattern Excessively slow; Short stride; Foward flexed   Gait Assistance 5  Supervision   Assistive Device Rolling walker   Distance 40ft x 2    Balance   Static Sitting Fair   Static Standing Fair -   Ambulatory Fair -   Endurance Deficit   Endurance Deficit Yes   Endurance Deficit Description Pain, fatigue   Activity Tolerance   Activity Tolerance Patient limited by fatigue;Patient limited by pain   Medical Staff Made Aware Chise, OT; OT present for co evaluation due to pts unstable presentation, being s/p sx intervention, new physical limitations, and decreased activity tolerance which limits pts overall physical performance    Nurse Made Aware Patient appropriate seen and mobilized per nursing   Assessment   Prognosis Good   Problem List Decreased strength;Decreased endurance; Impaired balance;Decreased mobility;Pain;Orthopedic restrictions;Decreased range of motion   Assessment Pt is 77 y o  female seen for PT evaluation s/p admit to [facility] on 6/7/2021  Two pt identifiers were used to confirm  Pt presented for scheduled Right L4-5 transverse lumbar interbody fusion and decompression (N/A) which was performed on 06/07/2021  Pt was admitted with a primary dx of:  Lumbar facet arthropathy s/p Right L4-5 transverse lumbar interbody fusion and decompression (N/A)  PT now consulted for assessment of mobility and d/c needs  Pts current co morbidities affecting treatment include:  Disease of thyroid gland, osteopenia, high cholesterol, and personal factors including steps to enter home  Pts current clinical presentation is Unstable/ Unpredictable (high complexity) due to Ongoing medical management for primary dx, Increased reliance on more restrictive AD compared to baseline, Decreased activity tolerance compared to baseline, Fall risk, Increased assistance needed from caregiver at current time, Spinal precautions at current time, Continuous pulse oximetry monitoring , s/p surgical intervention    Upon evaluation, pt currently is requiring Min Ax1 for bed mobility; Min Ax1 for transfers and Supervision for ambulation w/ RW   Patient denies any lightheadedness or dizziness with ambulation  Pt presents at PT eval functioning below baseline and currently w/ overall mobility deficits 2* to: BLE weakness, decreased ROM, impaired balance, decreased endurance, gait deviations, pain, decreased activity tolerance compared to baseline, fall risk, spinal precautions  Pt currently at a fall risk 2* to impairments listed above  Based on the aforementioned PT evaluation, pt will continue to benefit from skilled Acute PT interventions to address stated impairments; to maximize functional mobility; for ongoing pt/ family training; and DME needs   At conclusion of PT session pt returned back in chair with phone and call bell within reach  Pt denies any further questions at this time  PT is currently recommending Home with increased family support and Outpatient PT when appropriate  PT will continue to follow during hospital stay  Barriers to Discharge None   Barriers to Discharge Comments Patient denies any mobility or safety concerns about returning home at time of discharge  Pt denies concerns negotiating [facility] home    Goals   Patient Goals " to stay in the hospital one more night"   [facility] Expiration Date 06/18/21   Short Term Goal #1 In 10 days pt will complete: 1) Bed mobility skills with mod I to increase safety and independence as well as decrease caregiver burden  2) Functional transfers with mod I to promote increased independence, safety, and QOL  3) Ambulate 150' using least restrictive AD with mod I without LOB and stable vitals so that pt can negotiate previous living environment safely and promote independence with functional mobility and return to PLOF  4) Stair training up/ down 2 step/s using rail/s with S so that pt can enter/negotiate previous living environment safely and decrease fall risk  5) Improve balance grades by 1/2 grade to increase safety with all mobility and decrease fall risk  6) Improve BLE strength by 1/2 grade to help increase overall functional mobility and decrease fall risk  Plan   Treatment/Interventions Functional transfer training;LE strengthening/ROM; Elevations; Therapeutic exercise; Endurance training;Patient/family training;Equipment eval/education; Bed mobility;Gait training;Spoke to nursing;OT   PT Frequency Other (Comment)  (3-6x a week )   Recommendation   PT Discharge Recommendation Home with outpatient rehabilitation  (home with increased support, OPPT when appropriate )   Equipment Recommended Walker; Other (Comment)  ([facility] )   Walker Package Recommended Wheeled walker   PT - OK to Discharge Yes  (When medically cleared)   Additional Comments Patient denies any mobility or safety concerns about returning home at time of discharge  Pt denies concerns negotiating Y[facility] home    Jaycob Conner 435   Turning in Bed Without Bedrails 3   Lying on Back to Sitting on Edge of Flat Bed 3   Moving Bed to Chair 4   Standing Up From Chair 3   Walk in Room 4   Climb 3-5 Stairs 3   Basic Mobility Inpatient Raw Score 20   Basic Mobility Standardized Score 43 99   Modified Keller Scale   Modified Keller Scale 4   Barthel Index   Feeding 10   Bathing 0   Grooming Score 5   Dressing Score 5   Bladder Score 10   Bowels Score 10   Toilet Use Score 10   Transfers (Bed/Chair) Score 10   Mobility (Level Surface) Score 0  (< 50 yds during PT eval )   Stairs Score 0   Barthel Index Score 60   Portions of the documentation may have been created using voice recognition software  Occasional wrong word or sound alike substitutions may have occurred due to the inherent limitations of the voice recognition software  Read the chart carefully and recognize, using context, where substitutions have occurred      Adam Huston, PT, DPT

## 2021-06-08 NOTE — PHYSICAL THERAPY NOTE
PHYSICAL THERAPY EVALUATION  NAME:  Kobe Robles  DATE: 06/08/21    AGE:   77 y o  Mrn:   [id number]  ADMIT DX:  Facet arthropathy, lumbar [M47 816]  Spondylolisthesis [M43 10]  Stenosis of lateral recess of lumbar spine [M48 061]  Lumbar radiculopathy [M54 16]    Past Medical History:   Diagnosis Date    Disease of thyroid gland     High cholesterol     Osteopenia     Peroneal tendonitis        Past Surgical History:   Procedure Laterality Date    COLON MANOMETRY      COLONOSCOPY      DENTAL SURGERY      removal of broken tooth and implant to be done    DILATION AND CURETTAGE OF UTERUS      ECTOPIC PREGNANCY SURGERY      NASAL SEPTUM SURGERY      REFRACTIVE SURGERY      VAGINAL DELIVERY         Length Of Stay: 0    PHYSICAL THERAPY EVALUATION:        06/08/21 0935   Note Type   Note type Evaluation   Pain Assessment   Pain Assessment Tool 0-10   Pain Score 7   Pain Location/Orientation Location: Back   Pain Onset/Description Onset: Ongoing;Frequency: Constant/Continuous; Descriptor: Aching   Effect of Pain on Daily Activities Increased pain with activity    Patient's Stated Pain Goal No pain   Hospital Pain Intervention(s) Ambulation/increased activity;Repositioned   Home Living   Type of [address] [place] [address] Two level; Able to live on main level with bedroom/bathroom;Stairs to enter with rails  (2 YASIR, 2 steps to living room )   Home Equipment   (None as per patient)   Additional Comments Patient reports living supportive spouse to assist as needed   Prior Function   Level of [place] Independent with ADLs and functional mobility   Lives With Spouse   Receives Help From Family   ADL Assistance Independent   Falls in the last 6 months 0   Comments Patient denies use of an assistive device for ambulation prior to admission   Restrictions/Precautions   Weight Bearing Precautions Per Order No   Braces or Orthoses Other (Comment)  (No back brace required per Neurosurgery)   Other Precautions Pain;Multiple lines; Fall Risk;Spinal precautions   General   Family/Caregiver Present No   Cognition   Overall Cognitive Status WFL   Arousal/Participation Alert   Orientation Level Oriented X4   Memory Within functional limits   Following Commands Follows all commands and directions without difficulty   RUE Assessment   RUE Assessment WFL   LUE Assessment   LUE Assessment WFL   RLE Assessment   RLE Assessment WFL   Strength RLE   RLE Overall Strength 4-/5   LLE Assessment   LLE Assessment WFL   Strength LLE   LLE Overall Strength 4-/5   Bed Mobility   Supine to Sit 4  Minimal assistance   Additional items Assist x 1; Increased time required;Verbal cues   Transfers   Sit to Stand 4  Minimal assistance   Additional items Assist x 1; Increased time required;Verbal cues   Stand to Sit 4  Minimal assistance   Additional items Assist x 1; Increased time required;Verbal cues   Toilet transfer 4  Minimal assistance   Additional items Assist x 1; Increased time required   Additional Comments Cuing needed for hand placement during transfers   Ambulation/Elevation   Gait pattern Excessively slow; Short stride; Foward flexed   Gait Assistance 5  Supervision   Assistive Device Rolling walker   Distance 40ft x 2    Balance   Static Sitting Fair   Static Standing Fair -   Ambulatory Fair -   Endurance Deficit   Endurance Deficit Yes   Endurance Deficit Description Pain, fatigue   Activity Tolerance   Activity Tolerance Patient limited by fatigue;Patient limited by pain   Medical Staff Made Aware Chise, OT; OT present for co evaluation due to pts unstable presentation, being s/p sx intervention, new physical limitations, and decreased activity tolerance which limits pts overall physical performance    Nurse Made Aware Patient appropriate seen and mobilized per nursing   Assessment   Prognosis Good   Problem List Decreased strength;Decreased endurance; Impaired balance;Decreased mobility;Pain;Orthopedic restrictions;Decreased range of motion   Assessment Pt is 77 y o  female seen for PT evaluation s/p admit to [facility] on 6/7/2021  Two pt identifiers were used to confirm  Pt presented for scheduled Right L4-5 transverse lumbar interbody fusion and decompression (N/A) which was performed on 06/07/2021  Pt was admitted with a primary dx of:  Lumbar facet arthropathy s/p Right L4-5 transverse lumbar interbody fusion and decompression (N/A)  PT now consulted for assessment of mobility and d/c needs  Pts current co morbidities affecting treatment include:  Disease of thyroid gland, osteopenia, high cholesterol, and personal factors including steps to enter home  Pts current clinical presentation is Unstable/ Unpredictable (high complexity) due to Ongoing medical management for primary dx, Increased reliance on more restrictive AD compared to baseline, Decreased activity tolerance compared to baseline, Fall risk, Increased assistance needed from caregiver at current time, Spinal precautions at current time, Continuous pulse oximetry monitoring , s/p surgical intervention    Upon evaluation, pt currently is requiring Min Ax1 for bed mobility; Min Ax1 for transfers and Supervision for ambulation w/ RW   Patient denies any lightheadedness or dizziness with ambulation  Pt presents at PT eval functioning below baseline and currently w/ overall mobility deficits 2* to: BLE weakness, decreased ROM, impaired balance, decreased endurance, gait deviations, pain, decreased activity tolerance compared to baseline, fall risk, spinal precautions  Pt currently at a fall risk 2* to impairments listed above  Based on the aforementioned PT evaluation, pt will continue to benefit from skilled Acute PT interventions to address stated impairments; to maximize functional mobility; for ongoing pt/ family training; and DME needs  At conclusion of PT session pt returned back in chair with phone and call bell within reach   Pt denies any further questions at this time  PT is currently recommending Home with increased family support and Outpatient PT  PT will continue to follow during hospital stay  Barriers to Discharge None   Barriers to Discharge Comments Patient denies any mobility or safety concerns about returning home at time of discharge  Pt denies concerns negotiating YASIR home    Goals   Patient Goals " to stay in the hospital one more night"   [facility] Expiration Date 06/18/21   Short Term Goal #1 In 10 days pt will complete: 1) Bed mobility skills with mod I to increase safety and independence as well as decrease caregiver burden  2) Functional transfers with mod I to promote increased independence, safety, and QOL  3) Ambulate 150' using least restrictive AD with mod I without LOB and stable vitals so that pt can negotiate previous living environment safely and promote independence with functional mobility and return to PLOF  4) Stair training up/ down 2 step/s using rail/s with S so that pt can enter/negotiate previous living environment safely and decrease fall risk  5) Improve balance grades by 1/2 grade to increase safety with all mobility and decrease fall risk  6) Improve BLE strength by 1/2 grade to help increase overall functional mobility and decrease fall risk  Plan   Treatment/Interventions Functional transfer training;LE strengthening/ROM; Elevations; Therapeutic exercise; Endurance training;Patient/family training;Equipment eval/education; Bed mobility;Gait training;Spoke to nursing;OT   PT Frequency Other (Comment)  (3-6x a week )   Recommendation   PT Discharge Recommendation Home with outpatient rehabilitation  (home with increased support, OPPT )   Equipment Recommended Walker; Other (Comment)  ([facility] )   Walker Package Recommended Wheeled walker   PT - OK to Discharge Yes  (When medically cleared)   Additional Comments Patient denies any mobility or safety concerns about returning home at time of discharge   Pt denies concerns negotiating YASIR home    Jaycob Conner 435   Turning in Bed Without Bedrails 3   Lying on Back to Sitting on Edge of Flat Bed 3   Moving Bed to Chair 4   Standing Up From Chair 3   Walk in Room 4   Climb 3-5 Stairs 3   Basic Mobility Inpatient Raw Score 20   Basic Mobility Standardized Score 43 99   Modified Franklin Scale   Modified Franklin Scale 4   Barthel Index   Feeding 10   Bathing 0   Grooming Score 5   Dressing Score 5   Bladder Score 10   Bowels Score 10   Toilet Use Score 10   Transfers (Bed/Chair) Score 10   Mobility (Level Surface) Score 0  (< 50 yds during PT eval )   Stairs Score 0   Barthel Index Score 60   Portions of the documentation may have been created using voice recognition software  Occasional wrong word or sound alike substitutions may have occurred due to the inherent limitations of the voice recognition software  Read the chart carefully and recognize, using context, where substitutions have occurred      Ashutosh Green, PT, DPT

## 2021-06-08 NOTE — CASE MANAGEMENT
Met with patient, discussed CM role, CM program  LOS 1 d  Bundle no  Unplanned readmit risk color green  30 day readmit no  Lives in split level home, 2 steps to enter the home, 2 steps to main level  Bedroom and full bathroom are on main level  IADLS, retired, still drives  Denies HHC, OPPT and IP rehab in past   Denies MH illness, D or A abuse  Has walker at home, but without wheels, has commode at home  No other DME   [facility] [address] [facility]  Primary contact  Stephanie Pitt [phone]  Has POA and LW,  is POA  Family can transport on DC    CM reviewed d/c planning process including the following: identifying help at home, patient preference for d/c planning needs, Discharge Lounge, [facility] Meds to Bed program, availability of treatment team to discuss questions or concerns patient and/or family may have regarding understanding medications and recognizing signs and symptoms once discharged  CM also encouraged patient to follow up with all recommended appointments after discharge  Patient advised of importance for patient and family to participate in managing patients medical well being  Walker ordered via parachute

## 2021-06-08 NOTE — PROGRESS NOTES
[facility]  Progress Note - [place] Ramp 1955, 77 y o  female MRN: [id number]  Unit/Bed#: [facility] 226-22 Encounter: [id number]  Primary Care Provider: Tamera Mason MD (Inactive)   Date and time admitted to hospital: 6/7/2021  5:35 AM    Stenosis of lateral recess of lumbar spine  Assessment & Plan  POD 1 Right L4-5 transverse lumbar interbody fusion and decompression   · Pre op symptoms, intractable low back pain with RLE radiculopathy  Patient failed conservative management  · Symptoms improved postoperatively    Plan:  · Postop upright x-ray pending  · No brace, no drain  · PT/OT eval  · Continue neurological checks  · Patient requires inpatient stay overnight for better pain control  · Pain regimen changed:  · Tylenol 975 mg q 8 hours  · Started gabapentin 100 mg TID  · Increased Robaxin to 750 mg Q6hrs  · Oxycodone 5/10 mg Q4hrs prn for moderate and severe pain  · IV Dilaudid 0 5 mg Q3hrs prn for breakthrough pain  · Bowel regimen:  Colace and Senokot  · IVFs were D/C'd, patient tolerating regular diet and voiding without difficulty  · DVT PPX: SCDs and SQ heparin    Rounds were completed with Carmen Suarez RN, updated on plan of care  Neurosurgery will continue to follow as primary, patient not medically stable for discharge, call with any further questions or concerns  Lumbar radiculopathy  Assessment & Plan  · See above plan    Spondylolisthesis  Assessment & Plan  · See above plan    * Facet arthropathy, lumbar  Assessment & Plan  · See above plan      Subjective/Objective      Chief Complaint: "I feel better"    Subjective:  Patient currently complaining of 1/10 low back pain without radiation  Patient does report when she was sitting on the chair she had worsening back pain which she reports as sharp/throbbing in nature  Spoke with patient about changes made to pain regimen    Will see how patient's pain control is tomorrow but will likely discharge home   Patient states she is able to walk upright instead of leaning forward as well she denies any leg pain  She also reports a slight headache which is improving  She denies any dizziness, blurry vision, chest pain, shortness of breath, abdominal pain, nausea, vomiting, diarrhea, no problems with bowel or bladder, no new weakness or numbness/tingling  Patient states she has not had a BM but is passing gas  Objective:  Patient comfortably lying in bed, NAD  I/O       06/06 0701 - 06/07 0700 06/07 0701 - 06/08 0700 06/08 0701 - 06/09 0700    P  O   120 120    I V  (mL/kg)  2740 (42)     IV Piggyback  550     Total Intake(mL/kg)  3410 (52 2) 120 (1 8)    Urine (mL/kg/hr)  2050 (1 3) 950 (2 3)    Blood  100     Total Output  2150 950    Net  +1260 -830           Unmeasured Urine Occurrence  3 x 2 x          Invasive Devices     Peripheral Intravenous Line            Peripheral IV 06/07/21 Left Arm 1 day    Peripheral IV 06/07/21 Right Hand 1 day                Physical Exam:  Vitals: Blood pressure 129/76, pulse 73, temperature 97 6 °F (36 4 °C), resp  rate 18, height 5' 5" (1 651 m), weight 65 3 kg (144 lb), SpO2 97 %  ,Body mass index is 23 96 kg/m²  General appearance: alert, appears stated age, cooperative and no distress  Head: Normocephalic, without obvious abnormality, atraumatic  Eyes: EOMI, PERRL, conjugate gaze  Neck: supple, symmetrical, trachea midline   Back:  Lumbar dressing intact, no erythema or drainage noted    Some mild tenderness to palpation around incision  Lungs: non labored breathing  Heart: regular heart rate  Neurologic:   Mental status: Alert, oriented x3, thought content appropriate, speech is clear, following commands  Cranial nerves: grossly intact (Cranial nerves II-XII)  Sensory: normal to LT in all extremities x4, JPS and DST intact  Motor: moving all extremities without focal weakness, strength 5/5 throughout  Reflexes: 2+ and symmetric, no Lutz's or clonus appreciated  Coordination: finger to nose normal bilaterally, no drift bilaterally      Lab Results:  Results from last 7 days   Lab Units 06/08/21  0543   WBC Thousand/uL 11 27*   HEMOGLOBIN g/dL 10 5*   HEMATOCRIT % 31 8*   PLATELETS Thousands/uL 232     Results from last 7 days   Lab Units 06/08/21  0554   POTASSIUM mmol/L 4 1   CHLORIDE mmol/L 107   CO2 mmol/L 26   BUN mg/dL 12   CREATININE mg/dL 0 77   CALCIUM mg/dL 9 1                 No results found for: TROPONINT  ABG:No results found for: PHART, GME5BLL, PO2ART, GET0HGU, U6EPNLZY, BEART, SOURCE    Imaging Studies: I have personally reviewed pertinent reports  and I have personally reviewed pertinent films in PACS    No results found  EKG, Pathology, and Other Studies: I have personally reviewed pertinent reports        VTE Pharmacologic Prophylaxis: Heparin    VTE Mechanical Prophylaxis: sequential compression device

## 2021-06-08 NOTE — OCCUPATIONAL THERAPY NOTE
Occupational Therapy Evaluation     Patient Name: Yancy Schultz  UYAWIYOJANA Date: 6/8/2021  Problem List  Principal Problem:    Facet arthropathy, lumbar  Active Problems:    Stenosis of lateral recess of lumbar spine    Spondylolisthesis    Lumbar radiculopathy    Past Medical History  Past Medical History:   Diagnosis Date    Disease of thyroid gland     High cholesterol     Osteopenia     Peroneal tendonitis      Past Surgical History  Past Surgical History:   Procedure Laterality Date    COLON MANOMETRY      COLONOSCOPY      DENTAL SURGERY      removal of broken tooth and implant to be done    DILATION AND CURETTAGE OF UTERUS      ECTOPIC PREGNANCY SURGERY      NASAL SEPTUM SURGERY      [place] ARTHDSIS POST/POSTEROLATRL/POSTINTERBODY LUMBAR N/A 6/7/2021    Procedure: Right L4-5 transverse lumbar interbody fusion and decompression;  Surgeon: Merly Bello MD;  [facility];  Service: Neurosurgery    REFRACTIVE SURGERY      VAGINAL DELIVERY           06/08/21 0933   OT Last Visit   OT Visit Date 06/08/21   Note Type   Note type Evaluation   Restrictions/Precautions   Weight Bearing Precautions Per Order No   Braces or Orthoses   (NONE REQUIRED PER NEUROSX )   Other Precautions Multiple lines;Pain;Spinal precautions   Pain Assessment   Pain Assessment Tool 0-10   Pain Score 7   Pain Location/Orientation Location: Back;Orientation: Lower   Patient's Stated Pain Goal No pain   Hospital Pain Intervention(s) Repositioned; Ambulation/increased activity; Emotional support   Home Living   Type of [address] [place] [address] Two level; Able to live on main level with bedroom/bathroom;Stairs to enter with rails  (2 YASIR; 2 STEPS INTO SUNK IN LIVING AREA)   Bathroom Shower/Tub Tub/shower unit   Bathroom Toilet Standard   Bathroom Equipment Commode   Bathroom Accessibility Accessible   Additional Comments NO USE OF DME AT BASELINE  OWNS BSC   Prior Function   Level of [place] Independent with ADLs and functional mobility   Lives With Spouse   Receives Help From Family   ADL Assistance Independent   IADLs Independent   Falls in the last 6 months 0   Vocational Part time employment   Lifestyle   [employer] PT REPORTS BEING I WITH ADLS/IADLS/DRIVING PTA  LIMITED FUNCTIONAL MOBILITY 2' BACK PAIN AT BASELINE    Reciprocal Relationships LIVES WITH SUPPORTIVE SPOUSE WHO IS ABLE TO BE HOME AND ASSIST AS NEEDED    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    Intrinsic Gratification ENJOYS DOING HOUSE WORK    Psychosocial   Psychosocial (WDL) WDL   Subjective   Subjective "I REALLY WOULD LIKE TO JUST STAY UNTIL TOMORROW"   ADL   Eating Assistance 7  Independent   Grooming Assistance 7  Independent   UB Bathing Assistance 5  Supervision/Setup   LB Bathing Assistance 4  Minimal Assistance   UB Dressing Assistance 5  Supervision/Setup   LB Dressing Assistance 4  Minimal Assistance   Toileting Assistance  5  Supervision/Setup   Functional Assistance 5  Supervision/Setup   Bed Mobility   Rolling R 5  Supervision   Additional items Increased time required;Verbal cues   Supine to Sit 4  Minimal assistance   Additional items Assist x 1; Increased time required;Verbal cues;LE management   Sit to Supine Unable to assess  (PT LEFT OOB WITH ALL NEEDS IN REACH )   Transfers   Sit to Stand 4  Minimal assistance   Additional items Assist x 1; Increased time required;Verbal cues   Stand to Sit 4  Minimal assistance   Additional items Assist x 1; Increased time required;Verbal cues   Toilet transfer 4  Minimal assistance   Additional items Assist x 1; Increased time required;Verbal cues   Functional Mobility   Functional Mobility 5  Supervision   Additional items Rolling walker   Balance   Static Sitting Fair   Static Standing Fair -   Ambulatory Fair -   Activity Tolerance   Activity Tolerance Patient tolerated treatment well;Patient limited by pain   Medical Staff Made Aware PT SEEN FOR CO-SESSION WITH SKILLED PHYSICAL THERAPIST 2' CLINICALLY UNSTABLE PRESENTATION, NEW PRECAUTIONS/LIMITATIONS, LIMITED ACTIVITY TOLERANCE AND PRESENT IMPAIRMENTS WHICH ARE A REGRESSION FROM THE PT'S BASELINE AND IMPACTING OVERALL OCCUPATIONAL PERFORMANCE  Nurse Made Aware APPRORPIATE TO SEE PER RN    RUE Assessment   RUE Assessment WFL   LUE Assessment   LUE Assessment WFL   Hand Function   Gross Motor Coordination Functional   Fine Motor Coordination Functional   Sensation   Light Touch No apparent deficits   Cognition   Overall Cognitive Status WFL   Arousal/Participation Alert; Cooperative   Attention Within functional limits   Orientation Level Oriented X4   Memory Within functional limits   Following Commands Follows all commands and directions without difficulty   Comments PT IS OVERALL PLEASANT AND COOPERATIVE  ENCOURAGEMENT REQUIRED T/O   Assessment   Assessment 78 YO Female SEEN FOR INITIAL OCCUPATIONAL THERAPY EVALUATION S/P R L4-5 TRANSVERSE LUMBAR INTERBODY FUSION AND DECOMPRESSION ON 6/7/21  PT WITH NO BRACE REQUIRED HOWEVER MAINTAINED SPINAL PRECAUTIONS  PROBLEMS LIST INCLUDES LUMBAR FACET ARTHROPATHY, DISEASE OF THYROID GLAND, HIGH CHOLESTEROL, OSTEOPENIA, SPONDYLOLISTHESIS AND LUMBAR RADICULOPATHY  PT IS FROM HOME WITH SPORTIVE SPOUSE WHERE SHE REPORTS BEING INDEPENDENT WITH ADLS/IADLS/DRIVING PTA  PT CURRENTLY REQUIRES OVERALL SUPERVISION-MIN A WITH ADLS, TRANSFERS AND FUNCTIONAL MOBILITY WITH USE OF RW  PT IS EXPERIENCING EXPECTED LIMITATIONS 2' PAIN, FATIGUE, IMPAIRED BALANCE, SPINAL PRECAUTIONS and OVERALL LIMITED ACTIVITY TOLERANCE  PT EDUCATED ON SPINAL PRECAUTIONS, LOG ROLL TECHNIQUE, DEEP BREATHING TECHNIQUES T/O ACTIVITY, SLOWING OF PACE, ENERGY CONSERVATION TECHNIQUES FOR CARRY OVER UPON D/C, INCREASED FAMILY SUPPORT and CONTINUE PARTICIPATION IN SELF-CARE/MOBILITY WITH STAFF Anny Aguilera   The patient's raw score on the AM-PAC Daily Activity inpatient short form is 21, standardized score is 44 27, greater than 39 4   Patients at this level are likely to benefit from discharge to home  Please refer to the recommendation of the Occupational Therapist for safe discharge planning  FROM AN OCCUPATIONAL THERAPY PERSPECTIVE, PT CAN RETURN HOME WITH INCREASED FAMILY SUPPORT WHEN MEDICALLY CLEARED  DME RECS INCLUDE USE OF BSC/SC, PT AGREEABLE  ALL QUESTIONS/CONCERNS ADDRESSED  NO ADDITIONAL ACUTE CARE OT NEEDS  D/C OT      Goals   Patient Goals TO STAY ANOTHER NIGHT    Recommendation   OT Discharge Recommendation No rehabilitation needs  (INCREASED FAMILY SUPPORT )   Equipment Recommended   (USE OF BSC/SC )   OT - OK to Discharge Yes   AM-PAC Daily Activity Inpatient   Lower Body Dressing 3   Bathing 3   Toileting 3   Upper Body Dressing 4   Grooming 4   Eating 4   Daily Activity Raw Score 21   Daily Activity Standardized Score (Calc for Raw Score >=11) 44 27   AM-PAC Applied Cognition Inpatient   Following a Speech/Presentation 4   Understanding Ordinary Conversation 4   Taking Medications 4   Remembering Where Things Are Placed or Put Away 4   Remembering List of 4-5 Errands 4   Taking Care of Complicated Tasks 4   Applied Cognition Raw Score 24   Applied Cognition Standardized Score 62 21   Modified [place] Scale   Modified [place] Scale 3       Documentation completed by Antonette Glass, LUCIANA, OTR/L

## 2021-06-08 NOTE — PLAN OF CARE
Problem: PAIN - ADULT  Goal: Verbalizes/displays adequate comfort level or baseline comfort level  Description: Interventions:  - Encourage patient to monitor pain and request assistance  - Assess pain using appropriate pain scale  - Administer analgesics based on type and severity of pain and evaluate response  - Implement non-pharmacological measures as appropriate and evaluate response  - Consider cultural and social influences on pain and pain management  - Notify physician/advanced practitioner if interventions unsuccessful or patient reports new pain  Outcome: Progressing     Problem: INFECTION - ADULT  Goal: Absence or prevention of progression during hospitalization  Description: INTERVENTIONS:  - Assess and monitor for signs and symptoms of infection  - Monitor lab/diagnostic results  - Monitor all insertion sites, i e  indwelling lines, tubes, and drains  - Monitor endotracheal if appropriate and nasal secretions for changes in amount and color  - [place] appropriate cooling/warming therapies per order  - Administer medications as ordered  - Instruct and encourage patient and family to use good hand hygiene technique  - Identify and instruct in appropriate isolation precautions for identified infection/condition  Outcome: Progressing     Problem: SAFETY ADULT  Goal: Patient will remain free of falls  Description: INTERVENTIONS:  - Assess patient frequently for physical needs  -  Identify cognitive and physical deficits and behaviors that affect risk of falls    -  [place] fall precautions as indicated by assessment   - Educate patient/family on patient safety including physical limitations  - Instruct patient to call for assistance with activity based on assessment  - Modify environment to reduce risk of injury  - Consider OT/PT consult to assist with strengthening/mobility  Outcome: Progressing  Goal: Maintain or return to baseline ADL function  Description: INTERVENTIONS:  -  Assess patient's ability to carry out ADLs; assess patient's baseline for ADL function and identify physical deficits which impact ability to perform ADLs (bathing, care of mouth/teeth, toileting, grooming, dressing, etc )  - Assess/evaluate cause of self-care deficits   - Assess range of motion  - Assess patient's mobility; develop plan if impaired  - Assess patient's need for assistive devices and provide as appropriate  - Encourage maximum independence but intervene and supervise when necessary  - Involve family in performance of ADLs  - Assess for home care needs following discharge   - Consider OT consult to assist with ADL evaluation and planning for discharge  - Provide patient education as appropriate  Outcome: Progressing  Goal: Maintain or return mobility status to optimal level  Description: INTERVENTIONS:  - Assess patient's baseline mobility status (ambulation, transfers, stairs, etc )    - Identify cognitive and physical deficits and behaviors that affect mobility  - Identify mobility aids required to assist with transfers and/or ambulation (gait belt, sit-to-stand, lift, walker, cane, etc )  - [place] fall precautions as indicated by assessment  - Record patient progress and toleration of activity level on Mobility SBAR; progress patient to next Phase/Stage  - Instruct patient to call for assistance with activity based on assessment  - Consider rehabilitation consult to assist with strengthening/weightbearing, etc   Outcome: Progressing     Problem: DISCHARGE PLANNING  Goal: Discharge to home or other facility with appropriate resources  Description: INTERVENTIONS:  - Identify barriers to discharge w/patient and caregiver  - Arrange for needed discharge resources and transportation as appropriate  - Identify discharge learning needs (meds, wound care, etc )  - Arrange for interpretive services to assist at discharge as needed  - Refer to Case Management Department for coordinating discharge planning if the patient needs post-hospital services based on physician/advanced practitioner order or complex needs related to functional status, cognitive ability, or social support system  Outcome: Progressing     Problem: Knowledge Deficit  Goal: Patient/family/caregiver demonstrates understanding of disease process, treatment plan, medications, and discharge instructions  Description: Complete learning assessment and assess knowledge base    Interventions:  - Provide teaching at level of understanding  - Provide teaching via preferred learning methods  Outcome: Progressing

## 2021-06-08 NOTE — ASSESSMENT & PLAN NOTE
POD 1 Right L4-5 transverse lumbar interbody fusion and decompression   · Pre op symptoms, intractable low back pain with RLE radiculopathy  Patient failed conservative management  · Symptoms improved postoperatively    Plan:  · Postop upright x-ray pending  · No brace, no drain  · PT/OT eval  · Continue neurological checks  · Patient requires inpatient stay overnight for better pain control  · Pain regimen changed:  · Tylenol 975 mg q 8 hours  · Started gabapentin 100 mg TID  · Increased Robaxin to 750 mg Q6hrs  · Oxycodone 5/10 mg Q4hrs prn for moderate and severe pain  · IV Dilaudid 0 5 mg Q3hrs prn for breakthrough pain  · Bowel regimen:  Colace and Senokot  · IVFs were D/C'd, patient tolerating regular diet and voiding without difficulty  · DVT PPX: SCDs and SQ heparin    Rounds were completed with Ericka Alston RN, updated on plan of care  Neurosurgery will continue to follow as primary, patient not medically stable for discharge, call with any further questions or concerns

## 2021-06-09 ENCOUNTER — TELEPHONE (OUTPATIENT)
Dept: NEUROSURGERY | Facility: CLINIC | Age: 66
End: 2021-06-09

## 2021-06-09 LAB
ERYTHROCYTE [DISTWIDTH] IN BLOOD BY AUTOMATED COUNT: 12.2 % (ref 11.6–15.1)
HCT VFR BLD AUTO: 32.8 % (ref 34.8–46.1)
HGB BLD-MCNC: 10.7 G/DL (ref 11.5–15.4)
MCH RBC QN AUTO: 29.5 PG (ref 26.8–34.3)
MCHC RBC AUTO-ENTMCNC: 32.6 G/DL (ref 31.4–37.4)
MCV RBC AUTO: 90 FL (ref 82–98)
PLATELET # BLD AUTO: 208 THOUSANDS/UL (ref 149–390)
PMV BLD AUTO: 9.1 FL (ref 8.9–12.7)
RBC # BLD AUTO: 3.63 MILLION/UL (ref 3.81–5.12)
WBC # BLD AUTO: 11.09 THOUSAND/UL (ref 4.31–10.16)

## 2021-06-09 PROCEDURE — 99024 POSTOP FOLLOW-UP VISIT: CPT | Performed by: NURSE PRACTITIONER

## 2021-06-09 PROCEDURE — 97530 THERAPEUTIC ACTIVITIES: CPT

## 2021-06-09 PROCEDURE — 85027 COMPLETE CBC AUTOMATED: CPT | Performed by: NURSE PRACTITIONER

## 2021-06-09 RX ORDER — BUTALBITAL, ACETAMINOPHEN AND CAFFEINE 50; 325; 40 MG/1; MG/1; MG/1
1 TABLET ORAL EVERY 6 HOURS PRN
Status: DISCONTINUED | OUTPATIENT
Start: 2021-06-09 | End: 2021-06-10 | Stop reason: HOSPADM

## 2021-06-09 RX ADMIN — ACETAMINOPHEN 975 MG: 325 TABLET, FILM COATED ORAL at 12:26

## 2021-06-09 RX ADMIN — DOCUSATE SODIUM 100 MG: 100 CAPSULE ORAL at 18:40

## 2021-06-09 RX ADMIN — ACETAMINOPHEN 650 MG: 325 TABLET, FILM COATED ORAL at 23:16

## 2021-06-09 RX ADMIN — HEPARIN SODIUM 5000 UNITS: 5000 INJECTION INTRAVENOUS; SUBCUTANEOUS at 23:16

## 2021-06-09 RX ADMIN — HEPARIN SODIUM 5000 UNITS: 5000 INJECTION INTRAVENOUS; SUBCUTANEOUS at 06:25

## 2021-06-09 RX ADMIN — GABAPENTIN 100 MG: 100 CAPSULE ORAL at 09:47

## 2021-06-09 RX ADMIN — LEVOTHYROXINE SODIUM 100 MCG: 100 TABLET ORAL at 06:24

## 2021-06-09 RX ADMIN — STANDARDIZED SENNA CONCENTRATE 8.6 MG: 8.6 TABLET ORAL at 09:47

## 2021-06-09 RX ADMIN — GABAPENTIN 100 MG: 100 CAPSULE ORAL at 23:16

## 2021-06-09 RX ADMIN — HEPARIN SODIUM 5000 UNITS: 5000 INJECTION INTRAVENOUS; SUBCUTANEOUS at 12:27

## 2021-06-09 RX ADMIN — ONDANSETRON 4 MG: 2 INJECTION INTRAMUSCULAR; INTRAVENOUS at 10:59

## 2021-06-09 RX ADMIN — BUTALBITAL, ACETAMINOPHEN, AND CAFFEINE 1 TABLET: 50; 325; 40 TABLET ORAL at 16:31

## 2021-06-09 RX ADMIN — ATORVASTATIN CALCIUM 10 MG: 10 TABLET, FILM COATED ORAL at 16:31

## 2021-06-09 RX ADMIN — METHOCARBAMOL TABLETS 750 MG: 750 TABLET, COATED ORAL at 01:11

## 2021-06-09 RX ADMIN — BUTALBITAL, ACETAMINOPHEN, AND CAFFEINE 1 TABLET: 50; 325; 40 TABLET ORAL at 23:17

## 2021-06-09 RX ADMIN — METHOCARBAMOL TABLETS 750 MG: 750 TABLET, COATED ORAL at 23:16

## 2021-06-09 RX ADMIN — ONDANSETRON 4 MG: 2 INJECTION INTRAMUSCULAR; INTRAVENOUS at 04:40

## 2021-06-09 RX ADMIN — OXYCODONE HYDROCHLORIDE 5 MG: 5 TABLET ORAL at 04:49

## 2021-06-09 RX ADMIN — METHOCARBAMOL TABLETS 750 MG: 750 TABLET, COATED ORAL at 18:40

## 2021-06-09 RX ADMIN — ACETAMINOPHEN 975 MG: 325 TABLET, FILM COATED ORAL at 06:23

## 2021-06-09 RX ADMIN — DOCUSATE SODIUM 100 MG: 100 CAPSULE ORAL at 09:47

## 2021-06-09 RX ADMIN — METHOCARBAMOL TABLETS 750 MG: 750 TABLET, COATED ORAL at 12:25

## 2021-06-09 RX ADMIN — METHOCARBAMOL TABLETS 750 MG: 750 TABLET, COATED ORAL at 06:23

## 2021-06-09 RX ADMIN — GABAPENTIN 100 MG: 100 CAPSULE ORAL at 16:31

## 2021-06-09 RX ADMIN — OXYCODONE HYDROCHLORIDE 10 MG: 10 TABLET ORAL at 18:40

## 2021-06-09 NOTE — ASSESSMENT & PLAN NOTE
POD 2 Right L4-5 transverse lumbar interbody fusion and decompression   · Pre op symptoms, intractable low back pain with RLE radiculopathy  Patient failed conservative management  · Symptoms improved postoperatively    Imaging:  Lumbar spine x-ray 6/8/21:Stable post operative appearance  Plan:  · Reviewed imaging with patient  · No brace, no drain  · PT/OT, recommending home with outpatient PT  · Continue neurological checks  · Pain regimen changed:  · Tylenol 975 mg q 8 hours  · Gabapentin 100 mg TID  · Robaxin to 750 mg Q6hrs  · Oxycodone 5/10 mg Q4hrs prn for moderate and severe pain  · D/C'd IV Dilaudid 0 5 mg Q3hrs prn for breakthrough pain  · Bowel regimen:  Colace and Senokot  · Nausea, well controlled on Zofran  · Patient with nausea today, likely multifactorial   · DVT PPX: SCDs and SQ heparin    Rounds were completed with Vinnie Goodwin, updated on plan of care  Neurosurgery will continue to follow as primary, patient not medically stable for discharge, call with any further questions or concerns

## 2021-06-09 NOTE — PLAN OF CARE
Problem: PHYSICAL THERAPY ADULT  Goal: Performs mobility at highest level of function for planned discharge setting  See evaluation for individualized goals  Description: Treatment/Interventions: Functional transfer training, LE strengthening/ROM, Elevations, Therapeutic exercise, Endurance training, Patient/family training, Equipment eval/education, Bed mobility, Gait training, Spoke to nursing, OT  Equipment Recommended: Obie Castleman, Other (Comment)([facility] [place] )       See flowsheet documentation for full assessment, interventions and recommendations  Outcome: Progressing  Note: Prognosis: Good  Problem List: Decreased endurance, Impaired balance, Decreased mobility, Pain, Orthopedic restrictions  Assessment: The pt  is limited due to nausea and a headache / migraine  She was able to mobilize in the bed without difficulty, but she deferred ambulating at this time due to eating some bagel and milk and trying to keep it down  She had several questions, and they were answered in regards to mobility and safety  She was instructed in strategies to pick things up off of the floor without violating her spinal precautions  She was provided an ice pack for her posterior neck to help with her headache  Discussed with nursing about the patient's migraine medication  Barriers to Discharge: None  Barriers to Discharge Comments: Patient denies any mobility or safety concerns about returning home at time of discharge  Pt denies concerns negotiating [facility] home      PT Discharge Recommendation: Home with outpatient rehabilitation     PT - OK to Discharge: Yes(When medically cleared)    See flowsheet documentation for full assessment

## 2021-06-09 NOTE — PHYSICAL THERAPY NOTE
Physical Therapy Progress Note     06/09/21 1535   PT Last Visit   PT Visit Date 06/09/21   Note Type   Note Type Treatment   Pain Assessment   Pain Assessment Tool 0-10   Pain Score 6   Pain Location/Orientation Location: Head   Pain Onset/Description Descriptor: Headache   Hospital Pain Intervention(s) Repositioned; Emotional support; Environmental changes   Restrictions/Precautions   Other Precautions Pain;Spinal precautions   Subjective   Subjective The pt  notes that she has a persistent headache and that she has nausea with any attempt to sit up  She stated that she feels that this may be a softer migraine, and that she usually takes Immitrex when she gets migraines  She reports that she has been getting to the bathroom independently by herself without difficulty  Bed Mobility   Rolling R 5  Supervision   Rolling L 5  Supervision   Supine to Sit 5  Supervision   Sit to Supine 5  Supervision   Balance   Static Sitting Fair   Activity Tolerance   Activity Tolerance Patient tolerated treatment well;Patient limited by pain   Nurse [address], RN  Assessment   Prognosis Good   Problem List Decreased endurance; Impaired balance;Decreased mobility;Pain;Orthopedic restrictions   Assessment The pt  is limited due to nausea and a headache / migraine  She was able to mobilize in the bed without difficulty, but she deferred ambulating at this time due to eating some bagel and milk and trying to keep it down  She had several questions, and they were answered in regards to mobility and safety  She was instructed in strategies to pick things up off of the floor without violating her spinal precautions  She was provided an ice pack for her posterior neck to help with her headache  Discussed with nursing about the patient's migraine medication  Barriers to Discharge None   Goals   Patient Goals To feel better     STG Expiration Date 06/18/21   PT Treatment Day 1   Plan   Treatment/Interventions Functional transfer training;LE strengthening/ROM; Elevations; Therapeutic exercise; Endurance training;Patient/family training;Bed mobility;Gait training   Progress Slow progress, decreased activity tolerance   PT Frequency   (3-6x a week )   Recommendation   PT Discharge Recommendation Home with outpatient rehabilitation   Equipment Recommended Walker  (Bedside commode )   Walker Package Recommended Wheeled walker   AM-PAC Basic Mobility Inpatient   Turning in Bed Without Bedrails 3   Lying on Back to Sitting on Edge of Flat Bed 3   Moving Bed to Chair 3   Standing Up From Chair 3   Walk in Room 3   Climb 3-5 Stairs 3   Basic Mobility Inpatient Raw Score 18   Basic Mobility Standardized Score 41 05     An AM-PAC Basic Mobility standardized score less than 42 9 suggests the patient may benefit from discharge to post-acute rehab services      Caryn Medrano, PTA

## 2021-06-09 NOTE — CASE MANAGEMENT
SL OPPT locations provided to patient, to start per neurosurgery guidance  Patient verbalized understanding  IMM reviewed with patient   patient agree with discharge determination

## 2021-06-09 NOTE — TELEPHONE ENCOUNTER
06/11/2021-PT DISCHARGED TO HOME    06/10/2021-PT STILL IN HOSPITAL    06/09/2021-PT STILL IN HOSPITAL  06/21/2021-2 WK POV W/NURSE  07/22/2021-6 WK POV      06/08/2021-(DESSIREE)WILL LIKELY DISCHARGE HOME TOMORROW

## 2021-06-09 NOTE — PROGRESS NOTES
[address] [facility]  Progress Note - Gladystine Severe 1955, 77 y o  female MRN: [id number]  Unit/Bed#: [facility] 181-40 Encounter: [id number]  Primary Care Provider: Claire Sher MD (Inactive)   Date and time admitted to hospital: 6/7/2021  5:35 AM    Stenosis of lateral recess of lumbar spine  Assessment & Plan  POD 2 Right L4-5 transverse lumbar interbody fusion and decompression   · Pre op symptoms, intractable low back pain with RLE radiculopathy  Patient failed conservative management  · Symptoms improved postoperatively    Imaging:  Lumbar spine x-ray 6/8/21:Stable post operative appearance  Plan:  · Reviewed imaging with patient  · No brace, no drain  · PT/OT, recommending home with outpatient PT  · Continue neurological checks  · Pain regimen changed:  · Tylenol 975 mg q 8 hours  · Gabapentin 100 mg TID  · Robaxin to 750 mg Q6hrs  · Oxycodone 5/10 mg Q4hrs prn for moderate and severe pain  · D/C'd IV Dilaudid 0 5 mg Q3hrs prn for breakthrough pain  · Bowel regimen:  Colace and Senokot  · Nausea, well controlled on Zofran  · Patient with nausea today, likely multifactorial   · DVT PPX: SCDs and SQ heparin    Rounds were completed with Maddie Chand, updated on plan of care  Neurosurgery will continue to follow as primary, patient not medically stable for discharge, call with any further questions or concerns  Lumbar radiculopathy  Assessment & Plan  · See above plan    Spondylolisthesis  Assessment & Plan  · See above plan    * Facet arthropathy, lumbar  Assessment & Plan  · See above plan      Subjective/Objective      Chief Complaint: "I have a HA and I'm nauseous"     Subjective:  Patient states she has a constant low dull headache which is always there  She also reports more nausea today specially when sitting upright  Patient reports she did vomit once  Patient states she is not eating much secondary to nausea    Patient denies any worsening headaches with change in positions  Patient states her pain is better controlled on her current pain regimen  Patient states it is hard to tell if her nausea comes on after taking pain medication  She denies any dizziness, blurry vision, chest pain, shortness of breath, abdominal pain, diarrhea, no problems with bowel or bladder, no new weakness or numbness/tingling  Objective:  Patient comfortably lying in bed, NAD  I/O       06/07 0701 - 06/08 0700 06/08 0701 - 06/09 0700 06/09 0701 - 06/10 0700    P  O  120 600     I V  (mL/kg) 2740 (42) 310 (2)     IV Piggyback 550      Total Intake(mL/kg) 3410 (52 2) 910 (5 8)     Urine (mL/kg/hr) 2050 (1 3) 950 (0 3)     Blood 100      Total Output 2150 950     Net +1260 -40            Unmeasured Urine Occurrence 3 x 7 x 2 x          Invasive Devices     Peripheral Intravenous Line            Peripheral IV 06/07/21 Right Hand 2 days                Physical Exam:  Vitals: Blood pressure 141/80, pulse 81, temperature 98 2 °F (36 8 °C), resp  rate 18, height 5' 5" (1 651 m), weight 65 8 kg (145 lb), SpO2 95 %  ,Body mass index is 24 13 kg/m²  General appearance: alert, appears stated age, cooperative and no distress  Head: Normocephalic, without obvious abnormality, atraumatic  Eyes: EOMI, PERRL, conjugate gaze  Neck: supple, symmetrical, trachea midline   Back:  Lumbar dressing intact, no erythema or drainage noted    Some mild tenderness to palpation around incision  Lungs: non labored breathing  Heart: regular heart rate  Neurologic:   Mental status: Alert, oriented x3, thought content appropriate, speech is clear, following commands  Cranial nerves: grossly intact (Cranial nerves II-XII)  Sensory: normal to LT in all extremities x4, JPS and DST intact  Motor: moving all extremities without focal weakness, strength 5/5 throughout  Reflexes: 2+ and symmetric, no Lutz's or clonus appreciated  Coordination: finger to nose normal bilaterally, no drift bilaterally      Lab Results:  Results from last 7 days   Lab Units 06/09/21  0516 06/08/21  0543   WBC Thousand/uL 11 09* 11 27*   HEMOGLOBIN g/dL 10 7* 10 5*   HEMATOCRIT % 32 8* 31 8*   PLATELETS Thousands/uL 208 232     Results from last 7 days   Lab Units 06/08/21  0554   POTASSIUM mmol/L 4 1   CHLORIDE mmol/L 107   CO2 mmol/L 26   BUN mg/dL 12   CREATININE mg/dL 0 77   CALCIUM mg/dL 9 1                 No results found for: TROPONINT  ABG:No results found for: PHART, BXW5FBY, PO2ART, YFG6HJH, F0YWGOJM, BEART, SOURCE    Imaging Studies: I have personally reviewed pertinent reports  and I have personally reviewed pertinent films in PACS    No results found  EKG, Pathology, and Other Studies: I have personally reviewed pertinent reports        VTE Pharmacologic Prophylaxis: Heparin    VTE Mechanical Prophylaxis: sequential compression device

## 2021-06-10 VITALS
BODY MASS INDEX: 25.83 KG/M2 | TEMPERATURE: 98.8 F | DIASTOLIC BLOOD PRESSURE: 68 MMHG | OXYGEN SATURATION: 95 % | HEART RATE: 81 BPM | RESPIRATION RATE: 18 BRPM | SYSTOLIC BLOOD PRESSURE: 128 MMHG | WEIGHT: 155 LBS | HEIGHT: 65 IN

## 2021-06-10 PROCEDURE — 99024 POSTOP FOLLOW-UP VISIT: CPT | Performed by: NURSE PRACTITIONER

## 2021-06-10 RX ORDER — SENNOSIDES 8.6 MG
8.6 TABLET ORAL DAILY
Refills: 0
Start: 2021-06-11 | End: 2021-09-09

## 2021-06-10 RX ORDER — OXYCODONE HYDROCHLORIDE 5 MG/1
5-10 TABLET ORAL EVERY 4 HOURS PRN
Qty: 30 TABLET | Refills: 0 | Status: SHIPPED | OUTPATIENT
Start: 2021-06-10 | End: 2021-06-17

## 2021-06-10 RX ORDER — METHOCARBAMOL 750 MG/1
750 TABLET, FILM COATED ORAL EVERY 6 HOURS SCHEDULED
Qty: 28 TABLET | Refills: 0 | Status: SHIPPED | OUTPATIENT
Start: 2021-06-10 | End: 2021-06-16 | Stop reason: SDUPTHER

## 2021-06-10 RX ORDER — GABAPENTIN 100 MG/1
100 CAPSULE ORAL 3 TIMES DAILY
Qty: 30 CAPSULE | Refills: 0 | Status: SHIPPED | OUTPATIENT
Start: 2021-06-10 | End: 2021-07-15

## 2021-06-10 RX ORDER — ACETAMINOPHEN 325 MG/1
975 TABLET ORAL EVERY 8 HOURS SCHEDULED
Refills: 0
Start: 2021-06-10 | End: 2021-09-09

## 2021-06-10 RX ORDER — DOCUSATE SODIUM 100 MG/1
100 CAPSULE, LIQUID FILLED ORAL 2 TIMES DAILY
Qty: 10 CAPSULE | Refills: 0
Start: 2021-06-10 | End: 2021-09-09

## 2021-06-10 RX ORDER — BUTALBITAL, ACETAMINOPHEN AND CAFFEINE 50; 325; 40 MG/1; MG/1; MG/1
1 TABLET ORAL EVERY 6 HOURS PRN
Qty: 28 TABLET | Refills: 0 | Status: SHIPPED | OUTPATIENT
Start: 2021-06-10 | End: 2021-06-16 | Stop reason: SDUPTHER

## 2021-06-10 RX ADMIN — METHOCARBAMOL TABLETS 750 MG: 750 TABLET, COATED ORAL at 11:08

## 2021-06-10 RX ADMIN — STANDARDIZED SENNA CONCENTRATE 8.6 MG: 8.6 TABLET ORAL at 08:28

## 2021-06-10 RX ADMIN — LEVOTHYROXINE SODIUM 100 MCG: 100 TABLET ORAL at 05:12

## 2021-06-10 RX ADMIN — HEPARIN SODIUM 5000 UNITS: 5000 INJECTION INTRAVENOUS; SUBCUTANEOUS at 05:11

## 2021-06-10 RX ADMIN — ACETAMINOPHEN 650 MG: 325 TABLET, FILM COATED ORAL at 06:30

## 2021-06-10 RX ADMIN — OXYCODONE HYDROCHLORIDE 10 MG: 10 TABLET ORAL at 04:38

## 2021-06-10 RX ADMIN — BUTALBITAL, ACETAMINOPHEN, AND CAFFEINE 1 TABLET: 50; 325; 40 TABLET ORAL at 11:08

## 2021-06-10 RX ADMIN — GABAPENTIN 100 MG: 100 CAPSULE ORAL at 08:28

## 2021-06-10 RX ADMIN — ACETAMINOPHEN 650 MG: 325 TABLET, FILM COATED ORAL at 14:58

## 2021-06-10 RX ADMIN — DOCUSATE SODIUM 100 MG: 100 CAPSULE ORAL at 08:28

## 2021-06-10 RX ADMIN — METHOCARBAMOL TABLETS 750 MG: 750 TABLET, COATED ORAL at 05:11

## 2021-06-10 NOTE — DISCHARGE INSTRUCTIONS
Discharge Instructions  Posterior Lumbar Fusion/Fixation      Surgical incisional care:   Maintain dressing in place for 3 days  On postoperative day 3, dressing may be removed and incision may be left open to air   Keep incision clean and dry  Avoid applying creams, lotion or antiseptic to incision area   Check the wound daily  If the incision becomes red, swollen, tender, warm, or has increased drainage please notify physician immediately   If steri-strips are in place, allow them to fall off  If still in place at two week postoperative visit, we will remove them   May shower 3 days after surgery, but do not soak in a tub and no swimming until cleared   o Incision may be cleaned with water and a mild antimicrobial soap using a clean washcloth  Incision is to be gently patted dry with a clean towel   Continue to change bed linens and pajamas more frequently  Wear clean clothes daily  Activity Restrictions:   No heavy lifting greater than 10lbs and no strenuous activities until cleared   No bending or twisting  No BLTs (bending, lifting, twisting)  Avoid pushing/pulling movements   May walk as tolerated  Encourage at least 4 short walks per day  No prolonged sitting   No driving for at least 2 weeks or until cleared by physician   If you were provided a brace, it is to be worn whenever you are out of bed until directed otherwise  It may be put on while sitting at bedside  Postoperative medication:   Take pain medications to relieve incision pain, and muscle relaxants to prevent spasms as directed  Please see after visit summary (AVS) for details   Do not operate heavy machinery or vehicles while taking sedating medications   Use a bowel regimen while on opioids as they induce constipation  Ie  Senokot-S, Miralax, Colace, etc  Increase fiber and water intake      Do not take ibuprofen, Naproxen/Aleve or any non steroidal anti-inflammatory medications, NSAIDs, until cleared by surgeon  May take Tylenol instead   If taking Coumadin, Aspirin, or Plavix, you may resume these medications when cleared by Neurosurgery  Follow-up as scheduled for a 2 week incision check  Follow-up as scheduled for 6 week postoperative visit with repeat Xrays  **Please notify MD if you experience a fever 101F, chills or have increased pain, numbness, tingling, or weakness in your legs, increased walking difficulties and/or bowel/bladder dysfunction/incontinence**    Instructed patient on importance of watching how much tylenol she is taking in 24hrs given the medication she was prescribed  Do not exceed 4,000mg/24hrs

## 2021-06-10 NOTE — DISCHARGE SUMMARY
[address] [facility]  Discharge- Tegan Ramp 1955, 77 y o  female MRN: [id number]  Unit/Bed#: [facility] 989-28 Encounter: [id number]  Primary Care Provider: Tamera Mason MD (Inactive)   Date and time admitted to hospital: 6/7/2021  5:35 AM    Stenosis of lateral recess of lumbar spine  Assessment & Plan  POD 3 Right L4-5 transverse lumbar interbody fusion and decompression   · Pre op symptoms, intractable low back pain with RLE radiculopathy  Patient failed conservative management  · Symptoms improved postoperatively    Imaging:  Lumbar spine x-ray 6/8/21:Stable post operative appearance  Plan:  · No brace, no drain  · PT/OT, recommending home with outpatient PT  · Continue neurological checks  · Pain regimen changed:  · Tylenol 975 mg q 8 hours  · Gabapentin 100 mg TID  · Robaxin to 750 mg Q6hrs  · Oxycodone 5/10 mg Q4hrs prn for moderate and severe pain  · Ordered Fioricet 1 tablet Q6hrs for HAs   · Bowel regimen:  Colace and Senokot  · Nausea, well controlled on Zofran  · Patients nausea and HAs have improved and she feels comfortably going home today  · Instructed patient on importance of watching how much tylenol she is taking in 24hrs given the medication she was prescribed  Do not exceed 4,000mg/24hrs  · DVT PPX: SCDs and SQ heparin    Rounds were completed with Katy Roman, updated on plan of care  Neurosurgery will continue to follow as primary, patient medically stable for discharge home today, call with any further questions or concerns  Lumbar radiculopathy  Assessment & Plan  · See above plan    Spondylolisthesis  Assessment & Plan  · See above plan    * Facet arthropathy, lumbar  Assessment & Plan  · See above plan          Resolved Problems  Date Reviewed: 6/7/2021    None        Subjective/objective    Chief complaint:"I want to go home today"    Subjective:  Patient currently complaining of 1-2/10 low back pain with no radiation    Patient states her current pain regimen helps with her back pain  Patient states her headaches and nausea have also improved  She currently rates her headache at the back of her head a 2-3/10  Patient states her nausea has resolved she was able to eat breakfast and lunch  She denies any dizziness, blurry vision, double vision, chest pain, shortness of breath, abdominal pain, nausea, vomiting, diarrhea, no problems with bowel or bladder, no new weakness or numbness/tingling  Patient states she has not had a BM but is passing gas  Objective:  Patient comfortably lying in bed, NAD  General appearance: alert, appears stated age, cooperative and no distress  Head: Normocephalic, without obvious abnormality, atraumatic  Eyes: EOMI, PERRL, conjugate  Neck: supple, symmetrical, trachea midline  Back:  Lumbar dressing intact, no erythema or drainage noted  Lungs: non labored breathing  Heart: regular heart rate  Neurologic:   Mental status: Alert, oriented x3, thought content appropriate, speech is clear, following commands  Cranial nerves: grossly intact (Cranial nerves II-XII)  Sensory: normal to LT in all extremities x4,JPS and DST intact  Motor: moving all extremities without focal weakness, strength 5/5 throughout  Reflexes: 2+ and symmetric, no Lutz's or clonus appreciated  Coordination: finger to nose normal bilaterally, no drift bilaterally    Discharge Date: 6/10/21    Admitting Diagnosis: Facet arthropathy, lumbar [M47 816]  Spondylolisthesis [M43 10]  Stenosis of lateral recess of lumbar spine [M48 061]  Lumbar radiculopathy [M54 16]  S/p right L4-5 transverse lumbar interbody fusion and decompression    Discharge Diagnosis: 1  Lumbar facet arthropathy  2  Spondylolisthesis  3  Stenosis of lateral recess of lumbar spine  4  Lumbar radiculopathy  5   S/p right L4-5 transverse lumbar interbody fusion and decompression    Attending: Dr Zachary Owen    Procedures Performed: S/p right L4-5 transverse lumbar interbody fusion and decompression    Radiology:  Xr Lumbar Spine 2 Or 3 Views    Result Date: 6/9/2021  Narrative: LUMBAR SPINE INDICATION:  s/p Right L4-5 transverse lumbar interbody fusion and decompression    COMPARISON: 6/7/2021 VIEWS: AP and lateral projections IMAGES: FINDINGS: Posterior yeison and pedicle screw fusion L4-L5  Hardware appears intact  Spinal alignment is stable  No acute osseous findings  Impression: Stable post operative appearance  Workstation performed: [id number]     Xr Spine Lumbar 2 Or 3 Views Injury    Result Date: 6/8/2021  Narrative: C-ARM - lumbar spine INDICATION: Facet arthropathy, lumbar [M47 816]  Procedure guidance  COMPARISON:  MRI dated 10/22/2020 TECHNIQUE: FLUOROSCOPY TIME:   2 minutes 22 seconds 3 FLUOROSCOPIC IMAGES FINDINGS: Fluoroscopic guidance provided for intraoperative localization during lumbar fusion procedure  Initial image demonstrates K wires at L4 and L5  Osseous and soft tissue detail limited by technique  Impression: Fluoroscopic guidance provided for intraoperative localization during lumbar fusion procedure  Please refer to the separate procedure notes for additional details  Localization procedure was performed, with the OR notified of the level via telephone conversation with the OR x-ray technologist   Additional images were obtained after level verification and are present for evaluation  Workstation performed: [facility] Course: Michaelle Lopez is a 76 y/o female who presented to the outpatient office complaining of intractable low back pain with right lower extremity radiculopathy  Patient failed conservative management  Imaging demonstrated spondylolisthesis of L4 on 5 with facet arthropathy and destruction of the lateral aspect of the facets  This produced lateral recess stenosis which cause foraminal narrowing    Patient underwent a right L4-5 transverse lumbar interbody fusion and decompression under general anesthesia with minimal blood loss and no complications  Patient was kept in the PACU until stable and then moved to the floor  Patient received lumbar spine x-ray postoperatively which demonstrated stable postoperative appearance  PT and OT were consulted and recommend home with outpatient therapy  Patient was cleared medically for discharge today 06/10/2021  Patient had some headaches and nausea postoperatively which improved today and she is medically stable for discharge home  Prior to discharge, postoperative instructions were discussed with patient  During that time, all questions and concerns were addressed  Patient will follow up outpatient in 2 weeks for an incision check and 6 weeks with repeat lumbar spine x-ray  Condition at Discharge: good     Discharge instructions/Information to patient and family:   See after visit summary for information provided to patient and family  Provisions for Follow-Up Care:  See after visit summary for information related to follow-up care and any pertinent home health orders  Disposition: Home        Planned Readmission: No    Discharge Statement   I spent 26 minutes discharging the patient  This time was spent on the day of discharge  I had direct contact with the patient on the day of discharge  Additional documentation is required if more than 30 minutes were spent on discharge  Discharge Medications:  See after visit summary for reconciled discharge medications provided to patient and family

## 2021-06-10 NOTE — ASSESSMENT & PLAN NOTE
POD 3 Right L4-5 transverse lumbar interbody fusion and decompression   · Pre op symptoms, intractable low back pain with RLE radiculopathy  Patient failed conservative management  · Symptoms improved postoperatively    Imaging:  Lumbar spine x-ray 6/8/21:Stable post operative appearance  Plan:  · No brace, no drain  · PT/OT, recommending home with outpatient PT  · Continue neurological checks  · Pain regimen changed:  · Tylenol 975 mg q 8 hours  · Gabapentin 100 mg TID  · Robaxin to 750 mg Q6hrs  · Oxycodone 5/10 mg Q4hrs prn for moderate and severe pain  · Ordered Fioricet 1 tablet Q6hrs for HAs   · Bowel regimen:  Colace and Senokot  · Nausea, well controlled on Zofran  · Patients nausea and HAs have improved and she feels comfortably going home today  · Instructed patient on importance of watching how much tylenol she is taking in 24hrs given the medication she was prescribed  Do not exceed 4,000mg/24hrs  · DVT PPX: SCDs and SQ heparin    Rounds were completed with Bridger Omer, updated on plan of care  Neurosurgery will continue to follow as primary, patient medically stable for discharge home today, call with any further questions or concerns

## 2021-06-11 ENCOUNTER — TELEPHONE (OUTPATIENT)
Dept: NEUROSURGERY | Facility: CLINIC | Age: 66
End: 2021-06-11

## 2021-06-11 NOTE — TELEPHONE ENCOUNTER
Called patient to see how she is doing after surgery  Patient's  reports she is doing well overall and denies any incisional issues or fevers  Patient is able to ambulate around the house and complete ADLs  Educated the  about the importance of preventing blood clots and provided measures how to prevent them  Patient has moved her bowels since the surgery  Encouraged fiber intake and fluids  Reviewed incision care with Bill  Advised that after three days she may take a shower and gently wash the surgical site with soap and water  Use clean wash cloth, towels, and clothing  Do not submerge in water until cleared by the surgeon  Do not apply any creams, ointments, or lotions to the site  Suzy Vargas is aware to call the office if any redness, swelling, drainage, dehiscence of incision, or fever >100 F occurs  Suzy Vargas is aware to call the office if any concerns or questions may arise  Reminded Suzy Vargas of patient's upcoming appointments with the date/time/location  Suzy Vargas was appreciative for the call

## 2021-06-15 LAB
DME PARACHUTE DELIVERY DATE ACTUAL: NORMAL
DME PARACHUTE DELIVERY DATE REQUESTED: NORMAL
DME PARACHUTE ITEM DESCRIPTION: NORMAL
DME PARACHUTE ORDER STATUS: NORMAL
DME PARACHUTE SUPPLIER NAME: NORMAL
DME PARACHUTE SUPPLIER PHONE: NORMAL

## 2021-06-16 ENCOUNTER — DOCUMENTATION (OUTPATIENT)
Dept: NEUROSURGERY | Facility: CLINIC | Age: 66
End: 2021-06-16

## 2021-06-16 DIAGNOSIS — Z98.890 POST-OPERATIVE STATE: ICD-10-CM

## 2021-06-16 RX ORDER — BUTALBITAL, ACETAMINOPHEN AND CAFFEINE 50; 325; 40 MG/1; MG/1; MG/1
1 TABLET ORAL EVERY 6 HOURS PRN
Qty: 24 TABLET | Refills: 0 | Status: SHIPPED | OUTPATIENT
Start: 2021-06-16 | End: 2021-09-09

## 2021-06-16 RX ORDER — METHOCARBAMOL 750 MG/1
750 TABLET, FILM COATED ORAL EVERY 6 HOURS SCHEDULED
Qty: 60 TABLET | Refills: 0 | Status: SHIPPED | OUTPATIENT
Start: 2021-06-16 | End: 2021-07-01 | Stop reason: SDUPTHER

## 2021-06-16 NOTE — TELEPHONE ENCOUNTER
Patient LM on nurse line requesting a call back regarding her medication refills  She said that she went to the tylenol but she tried stopping the tylenol but was still having a lot of pain when she is sitting  She reports that she does not have any pain when she is walking, which she is happy about  She is requesting a refill of her muscle relaxer and she said that she has still been experiencing headaches  She is taking Fioricet for headaches that she has been experiencing since the surgery  She is taking one every 6 hours and was hoping that she could have a refill of this medication as well  I advised her, will speak with a provider in our office and call her back once I get a response

## 2021-06-17 ENCOUNTER — TELEPHONE (OUTPATIENT)
Dept: NEUROSURGERY | Facility: CLINIC | Age: 66
End: 2021-06-17

## 2021-06-17 NOTE — TELEPHONE ENCOUNTER
Received a call from Juan Ramirez wanting to confirm the need for imaging prior to her fup on Monday  Advised does not need anything  Offered virtual visit since she lives in [facility]  She accepted  Will send a picture of her incision to the office for review

## 2021-06-21 ENCOUNTER — TELEMEDICINE (OUTPATIENT)
Dept: NEUROSURGERY | Facility: CLINIC | Age: 66
End: 2021-06-21

## 2021-06-21 DIAGNOSIS — Z98.890 POST-OPERATIVE STATE: Primary | ICD-10-CM

## 2021-06-21 PROCEDURE — 99024 POSTOP FOLLOW-UP VISIT: CPT

## 2021-06-21 NOTE — PROGRESS NOTES
Virtual Brief Visit    Assessment/Plan:    Problem List Items Addressed This Visit     None      Visit Diagnoses     Post-operative state    -  Primary                Reason for visit is   Chief Complaint   Patient presents with    Virtual Brief Visit        Encounter provider Neurosurgery Nurse Saulo    Provider located at [address] [place]  [address] [place] [id number] [phone]    Recent Visits  Date Type Provider Dept   06/17/21 Telephone Beverly Romero RN [facility]   Showing recent visits within past 7 days and meeting all other requirements  Today's Visits  Date Type Provider Dept   06/21/21 Telemedicine NEUROSURGERY NURSE [address] today's visits and meeting all other requirements  Future Appointments  No visits were found meeting these conditions  Showing future appointments within next 150 days and meeting all other requirements       After connecting through telephone, the patient was identified by name and date of birth  Johanne Avelar was informed that this is a telemedicine visit and that the visit is being conducted through telephone  My office door was closed  No one else was in the room  She acknowledged consent and understanding of privacy and security of the platform  The patient has agreed to participate and understands she can discontinue the visit at any time  Subjective    Johanne Avelar is a 77 y o  female s/p Right L4-5 transverse lumbar interbody fusion and decompression (N/A Spine Lumbar)     HPI     Past Medical History:   Diagnosis Date    Disease of thyroid gland     High cholesterol     Osteopenia     Peroneal tendonitis        Past Surgical History:   Procedure Laterality Date    COLON MANOMETRY      COLONOSCOPY      DENTAL SURGERY      removal of broken tooth and implant to be done    DILATION AND CURETTAGE OF UTERUS      ECTOPIC PREGNANCY SURGERY      NASAL SEPTUM SURGERY      SC ARTHDSIS POST/POSTEROLATRL/POSTINTERBODY LUMBAR N/A 6/7/2021    Procedure: Right L4-5 transverse lumbar interbody fusion and decompression;  Surgeon: Anil Curtis MD;  [facility];  Service: Neurosurgery    REFRACTIVE SURGERY      VAGINAL DELIVERY         Current Outpatient Medications   Medication Sig Dispense Refill    acetaminophen (TYLENOL) 325 mg tablet Take 3 tablets (975 mg total) by mouth every 8 (eight) hours  0    atorvastatin (LIPITOR) 10 mg tablet TAKE 1 TABLET BY MOUTH EVERY DAY AT NIGHT  1    butalbital-acetaminophen-caffeine (FIORICET,ESGIC) -40 mg per tablet Take 1 tablet by mouth every 6 (six) hours as needed (headaches) 24 tablet 0    Calcium 200 MG TABS Take by mouth daily      cycloSPORINE (RESTASIS) 0 05 % ophthalmic emulsion Administer 1 drop to both eyes every 12 (twelve) hours       docusate sodium (COLACE) 100 mg capsule Take 1 capsule (100 mg total) by mouth 2 (two) times a day 10 capsule 0    gabapentin (NEURONTIN) 100 mg capsule Take 1 capsule (100 mg total) by mouth 3 (three) times a day 30 capsule 0    levothyroxine 100 mcg tablet Take 100 mcg by mouth daily  1    methocarbamol (ROBAXIN) 750 mg tablet Take 1 tablet (750 mg total) by mouth every 6 (six) hours 60 tablet 0    multivitamin (THERAGRAN) TABS Take 1 tablet by mouth daily      senna (SENOKOT) 8 6 mg Take 1 tablet (8 6 mg total) by mouth daily  0    Vitamin D, Cholecalciferol, 1000 units CAPS Take 5,000 Units by mouth daily       No current facility-administered medications for this visit  Allergies   Allergen Reactions    Meloxicam Other (See Comments)     Gets very bad headache and upset stomach                                                                                    Post-Op Visit- Neurosurgery    Assessment:    Wound Exam: Requested picture of incision be sent to me directly for visualization   See below: Discussion/Summary  Doing well postoperatively  Reports immediate relief from pre-op symptoms  Has mild radicular symptoms in BL legs however notes increased endurance and improved gait, and is very pleased with this  Reviewed incision care with patient including daily observation for s/s infection including: increased erythema, edema, drainage, dehiscence of incision or fever >101  Should these be observed, she understands that she is to call and/or return immediately for reassessment  Advised patient to continue cleansing area with mild soap and water and pat dry  Not to apply any lotions, creams, or ointments, & not to submerge in any water for 4  more weeks  She is to maintain activity restrictions until cleared by the surgeon  Activity levels were also reviewed with the patient in detail, she is to lift no greater than 10 pounds and ambulation is encouraged as tolerated  Verified date/time/location of upcoming POV and reminded her to complete x-rays prior to her visit on 7/22/2021  She is to call the office with any further questions or concerns, or if any incisional issues or fevers would arise  VIRTUAL VISIT DISCLAIMER    Beka Kam acknowledges that she has consented to an online visit or consultation  She understands that the online visit is based solely on information provided by her, and that, in the absence of a face-to-face physical evaluation by the physician, the diagnosis she receives is both limited and provisional in terms of accuracy and completeness  This is not intended to replace a full medical face-to-face evaluation by the physician  Beka Kam understands and accepts these terms

## 2021-06-29 ENCOUNTER — TELEPHONE (OUTPATIENT)
Dept: NEUROSURGERY | Facility: CLINIC | Age: 66
End: 2021-06-29

## 2021-06-29 NOTE — TELEPHONE ENCOUNTER
Received a call from patient requesting ok to wax her legs  Confirmed with RHETT BANDA that no contraindication for shaving or waxing her legs at this time after surgery

## 2021-07-01 DIAGNOSIS — Z98.890 POST-OPERATIVE STATE: ICD-10-CM

## 2021-07-01 RX ORDER — METHOCARBAMOL 750 MG/1
750 TABLET, FILM COATED ORAL EVERY 6 HOURS SCHEDULED
Qty: 60 TABLET | Refills: 0 | Status: SHIPPED | OUTPATIENT
Start: 2021-07-01 | End: 2021-09-09

## 2021-07-01 NOTE — TELEPHONE ENCOUNTER
Patient called nurseline questioning if she may have a refill of her robaxin  Returned call to patient who states she has been taking the robaxin every 6 hours as prescribed  Patient states she feels great and her pain is minimal  Patient states she has 6 pills left and will try to expand time frame in between each pill to every 8 or 12 to see if she notices a difference  Patient questioned if a refill can be ordered d/t the holiday weekend coming up so it will be available after she finishes the 6 pills  Will route to [facility] for recommendation  Patient appreciative of call back

## 2021-07-09 ENCOUNTER — TELEPHONE (OUTPATIENT)
Dept: NEUROSURGERY | Facility: CLINIC | Age: 66
End: 2021-07-09

## 2021-07-09 NOTE — TELEPHONE ENCOUNTER
Patient called nurseline questioning when she is able to have dental work done  Returned call to patient and advised it is typically 6 weeks post the type of surgery she had  Advised she wait until her 6 week appt coming up on 7/22 and discuss with DKO regarding starting any type of dental work  Patient appreciative of call back

## 2021-07-15 DIAGNOSIS — Z98.890 POST-OPERATIVE STATE: ICD-10-CM

## 2021-07-15 DIAGNOSIS — M54.50 LOW BACK PAIN, UNSPECIFIED BACK PAIN LATERALITY, UNSPECIFIED CHRONICITY, UNSPECIFIED WHETHER SCIATICA PRESENT: ICD-10-CM

## 2021-07-15 DIAGNOSIS — M54.16 LUMBAR RADICULOPATHY: ICD-10-CM

## 2021-07-15 DIAGNOSIS — M47.816 FACET ARTHROPATHY, LUMBAR: Primary | ICD-10-CM

## 2021-07-15 RX ORDER — GABAPENTIN 100 MG/1
100 CAPSULE ORAL 3 TIMES DAILY
Qty: 90 CAPSULE | Refills: 0 | Status: SHIPPED | OUTPATIENT
Start: 2021-07-15 | End: 2021-08-13

## 2021-07-15 NOTE — TELEPHONE ENCOUNTER
Received a call from Francisca Krueger requesting refill of gabapentin  She is scheduled for her 6 week post op visit on 7/22 with Dr Koko Muse, s/p lumbar fusion  Will route message to provider to refill if in agreement

## 2021-07-19 ENCOUNTER — APPOINTMENT (OUTPATIENT)
Dept: RADIOLOGY | Facility: CLINIC | Age: 66
End: 2021-07-19
Payer: MEDICARE

## 2021-07-19 DIAGNOSIS — Z98.890 POST-OPERATIVE STATE: ICD-10-CM

## 2021-07-19 PROCEDURE — 72100 X-RAY EXAM L-S SPINE 2/3 VWS: CPT

## 2021-07-21 NOTE — PROGRESS NOTES
DISCUSSION SUMMARY  This is a 77 y o  female who is status post transverse lumbar interbody fusion  She is doing very well  She is asymptomatic at this point  She does need physical therapy and follow-up imaging studies to ensure that her fusion continues to progress  Return in 3 months (on 10/22/2021)  Diagnosis ICD-10-CM Associated Orders   1  Postoperative examination  Z09 XR lumbar sp/bending only min 2-3 v     Ambulatory referral to Physical Therapy          Chief Complaint   Patient presents with    Post-op     6 WK POV, L4-5 TLIF W/LSPINE XRAYS 7/19/21       HPI  Abbey Sanchez presents for post-op visit following: Right L4-5 transverse lumbar interbody fusion and decompression on 6/7/21 (DKO)  · Preop Diagnosis: Facet arthropathy, lumbar [M47 816]; Spondylolisthesis [M43 10]; Stenosis of lateral recess of lumbar spine [M48 061]; Lumbar radiculopathy [M54 16]  · Presented to the outpatient setting with intractable low back pain and radiculopathy that was worsening over time  This patient is doing well after surgery  Current treatment: none, but has been walking 3-3 5 miles per day  Eating a regular diet without difficulty  Bowel movements are Normal  Bladder is  Normal  Incision: healing well  Review of Systems   Constitutional: Negative  HENT: Negative  Eyes: Negative  Respiratory: Negative  Cardiovascular: Negative  Gastrointestinal: Negative  Endocrine: Negative  Genitourinary: Negative  Musculoskeletal: Negative  Skin: Negative  Allergic/Immunologic: Negative  Neurological: Negative  Hematological: Negative  Psychiatric/Behavioral: Negative  All other systems reviewed and are negative  I reviewed the ROS        Vitals:    /68 (BP Location: Right arm, Patient Position: Sitting, Cuff Size: Adult)   Pulse 60   Temp 97 6 °F (36 4 °C) (Probe)   Resp 16   Ht 5' 5" (1 651 m)   Wt 65 8 kg (145 lb)   BMI 24 13 kg/m²       MEDICAL HISTORY  Past Medical History:   Diagnosis Date    Disease of thyroid gland     High cholesterol     Osteopenia     Peroneal tendonitis      Past Surgical History:   Procedure Laterality Date    COLON MANOMETRY      COLONOSCOPY      DENTAL SURGERY      removal of broken tooth and implant to be done    DILATION AND CURETTAGE OF UTERUS      ECTOPIC PREGNANCY SURGERY      NASAL SEPTUM SURGERY      [place] ARTHDSIS POST/POSTEROLATRL/POSTINTERBODY LUMBAR N/A 6/7/2021    Procedure: Right L4-5 transverse lumbar interbody fusion and decompression;  Surgeon: Fabby Fang MD;  [facility];  Service: Neurosurgery    REFRACTIVE SURGERY      VAGINAL DELIVERY       Social History     Tobacco Use    Smoking status: Never Smoker    Smokeless tobacco: Never Used   Vaping Use    Vaping Use: Never used   Substance Use Topics    Alcohol use: Yes     Comment: very occ    Drug use: Never        Current Outpatient Medications:     atorvastatin (LIPITOR) 10 mg tablet, TAKE 1 TABLET BY MOUTH EVERY DAY AT NIGHT, Disp: , Rfl: 1    Calcium 200 MG TABS, Take by mouth daily, Disp: , Rfl:     cycloSPORINE (RESTASIS) 0 05 % ophthalmic emulsion, Administer 1 drop to both eyes every 12 (twelve) hours , Disp: , Rfl:     gabapentin (NEURONTIN) 100 mg capsule, Take 1 capsule (100 mg total) by mouth 3 (three) times a day, Disp: 90 capsule, Rfl: 0    levothyroxine 100 mcg tablet, Take 100 mcg by mouth daily, Disp: , Rfl: 1    multivitamin (THERAGRAN) TABS, Take 1 tablet by mouth daily, Disp: , Rfl:     Vitamin D, Cholecalciferol, 1000 units CAPS, Take 5,000 Units by mouth daily, Disp: , Rfl:     acetaminophen (TYLENOL) 325 mg tablet, Take 3 tablets (975 mg total) by mouth every 8 (eight) hours, Disp:  , Rfl: 0    butalbital-acetaminophen-caffeine (FIORICET,ESGIC) -40 mg per tablet, Take 1 tablet by mouth every 6 (six) hours as needed (headaches), Disp: 24 tablet, Rfl: 0    docusate sodium (COLACE) 100 mg capsule, Take 1 capsule (100 mg total) by mouth 2 (two) times a day, Disp: 10 capsule, Rfl: 0    methocarbamol (ROBAXIN) 750 mg tablet, Take 1 tablet (750 mg total) by mouth every 6 (six) hours, Disp: 60 tablet, Rfl: 0    senna (SENOKOT) 8 6 mg, Take 1 tablet (8 6 mg total) by mouth daily, Disp:  , Rfl: 0   Allergies   Allergen Reactions    Meloxicam Other (See Comments)     Gets very bad headache and upset stomach        The following portions of the patient's history were updated by MA and reviewed by MD: allergies, current medications, past family history, past medical history, past social history, past surgical history and problem list       Physical Exam  Incision clean and dry  Her power is 5/5  Sensation is intact  Her range of motion is impressively preserved    RESULTS/DATA  I have personally reviewed pertinent films in PACS

## 2021-07-22 ENCOUNTER — OFFICE VISIT (OUTPATIENT)
Dept: NEUROSURGERY | Facility: CLINIC | Age: 66
End: 2021-07-22

## 2021-07-22 VITALS
HEART RATE: 60 BPM | HEIGHT: 65 IN | BODY MASS INDEX: 24.16 KG/M2 | DIASTOLIC BLOOD PRESSURE: 68 MMHG | TEMPERATURE: 97.6 F | SYSTOLIC BLOOD PRESSURE: 100 MMHG | RESPIRATION RATE: 16 BRPM | WEIGHT: 145 LBS

## 2021-07-22 DIAGNOSIS — Z09 POSTOPERATIVE EXAMINATION: Primary | ICD-10-CM

## 2021-07-22 PROCEDURE — 99024 POSTOP FOLLOW-UP VISIT: CPT | Performed by: NEUROLOGICAL SURGERY

## 2021-07-22 PROCEDURE — 1124F ACP DISCUSS-NO DSCNMKR DOCD: CPT | Performed by: NEUROLOGICAL SURGERY

## 2021-08-02 ENCOUNTER — EVALUATION (OUTPATIENT)
Dept: PHYSICAL THERAPY | Facility: CLINIC | Age: 66
End: 2021-08-02
Payer: MEDICARE

## 2021-08-02 DIAGNOSIS — Z09 POSTOPERATIVE EXAMINATION: ICD-10-CM

## 2021-08-02 DIAGNOSIS — R29.898 WEAKNESS OF BOTH HIPS: Primary | ICD-10-CM

## 2021-08-02 PROCEDURE — 97161 PT EVAL LOW COMPLEX 20 MIN: CPT

## 2021-08-02 PROCEDURE — 97110 THERAPEUTIC EXERCISES: CPT

## 2021-08-02 NOTE — PROGRESS NOTES
PT Evaluation     Today's date: 2021  Patient name: Aj Dimas  : 1955  MRN: [id number]  Referring provider: Glenny Eric MD  Dx:   Encounter Diagnosis     ICD-10-CM    1  Postoperative examination  Z09 Ambulatory referral to Physical Therapy                  Assessment  Assessment details: Aj Dimas is a pleasant 77 y o  female who presents ws/p lumbar fusion in   Pt largest deficit is decreased proximal hip strength  Pt has difficulty activating gluteal muscles and has minor pain with resisted abduction  Pt has only minor limitation with lumbar extension  Pt was educated on trying to avoid sustained provocative positioning such as bent over during gardening activities  Pt was also educated on proper squat form and a more in depth exercise program  The patient's greatest concerns are fear of not being able to keep active and future ill health (and wanting to prevent it)  No further referral appears necessary at this time based upon examination results  Primary Impairments:  1) proximal hip weakness  2) minor lumbar ROM and mobility restriction    Etiologic factors include lumbar fusion  Impairments: abnormal or restricted ROM, impaired physical strength, lacks appropriate home exercise program, pain with function and poor body mechanics    Symptom irritability: lowUnderstanding of Dx/Px/POC: goodPrognosis details: Positive prognostic indicators include positive attitude toward recovery, good understanding of diagnosis and treatment plan options, absence of peripheralization and absence of observed red flags  Negative prognostic indicators include none        Goals  Short Term Goals: to be achieved by 4 weeks  1) Patient to be independent with basic HEP  2) Increase lumbar spine AROM by 25% in all deficient planes   3) Increase LE strength by 1/2 MMT grade in all deficient planes    Long Term Goals: to be achieved by discharge  1) FOTO equal to or greater than 64  2) Patient to be independent with comprehensive HEP  3) Lumbar spine ROM WNL all planes to improve a/iadls  4) Increase LE strength to 5/5 MMT grade in all planes to improve a/iadls  5) Increase tolerance for gardening and exercise activities to >60 min  Plan  Patient would benefit from: skilled physical therapy  Planned modality interventions: Modalities PRN  Planned therapy interventions: activity modification, manual therapy, neuromuscular re-education, patient education, therapeutic activities, therapeutic exercise, graded activity, home exercise program, behavior modification and self care  Frequency: 2x week  Duration in weeks: 8  Treatment plan discussed with: patient        Subjective Evaluation    History of Present Illness  Mechanism of injury: History of Current Injury: Pt reports she has been doing much better since having a right L4-5 transverse lumbar interbody fusion and decompression and has been diligent with her HEP  Pt reports she has been walking about 2 miles everyday  Pt reports being very active all her life and wants to get back to working out more aggressively  Pt still has had pain with bending and stooping in the segments above her fusion  Surgery date: 2021  Pain location/Descriptors: stiffness/discmofort in the area around the   Aggravating factors: Weeding or activities where she is stooped/bending forward  Easing factors: taking breaks from activities  AM/PM pattern: no pattern  Special Questions: Pt denies N/T  Patient concerns: Getting back to more high intensity exercise, hiking, and gardening for longer periods of time    Occupation: Retired  and math            Not a recurrent problem   Quality of life: good    Pain  Current pain ratin  At best pain ratin  At worst pain ratin    Social Support  Steps to enter house: yes  Stairs in house: yes   Lives in: multiple-level home  Lives with: spouse    Employment status: not working  Hand dominance: right          Objective     Neurological Testing     Sensation     Lumbar   Left   Intact: light touch    Right   Intact: light touch    Active Range of Motion     Lumbar   Flexion:  WFL  Extension:  with pain Restriction level: moderate  Left lateral flexion:  WFL  Right lateral flexion:  WFL  Left rotation:  WFL  Right rotation:  [facility]    Joint Play   Joints within functional limits: T12 and L1     Hypomobile: L2, L3, L4, L5 and S1     Pain: L2, L3, L4, L5 and S1     Strength/Myotome Testing     Lumbar   Left   Heel walk: normal  Toe walk: normal    Right   Heel walk: normal  Toe walk: normal    Left Hip   Planes of Motion   Flexion: 4+  Extension: 4  Abduction: 4  External rotation: 4+  Internal rotation: 4+    Right Hip   Planes of Motion   Flexion: 4+  Extension: 4-  Abduction: 4-  External rotation: 4+  Internal rotation: 4+    Left Knee   Flexion: 5  Extension: 5    Right Knee   Flexion: 5  Extension: 5    Left Ankle/Foot   Dorsiflexion: 5  Plantar flexion: 5  Inversion: 5  Eversion: 5    Right Ankle/Foot   Dorsiflexion: 5  Plantar flexion: 5  Inversion: 5  Eversion: 5             Precautions: osteopenia, lumbar fusion       8/2            Manuals                                                                 Neuro Re-Ed             TrA activation 10x5" HEP            Pelvic tilts 10x5" HEP                                                                             Ther Ex             Supine 90/90 hamstring stretch 3x30" HEP            sidelying abduction x15 HEP            Prone SLR x15 HEP            squats x15 HEP            lunges x10 HEP            Planks, side planks 4x15" ea HEP                                      Ther Activity                                       Gait Training                                       Modalities

## 2021-08-13 DIAGNOSIS — M47.816 FACET ARTHROPATHY, LUMBAR: ICD-10-CM

## 2021-08-13 DIAGNOSIS — M54.16 LUMBAR RADICULOPATHY: ICD-10-CM

## 2021-08-13 DIAGNOSIS — Z98.890 POST-OPERATIVE STATE: ICD-10-CM

## 2021-08-13 DIAGNOSIS — M54.50 LOW BACK PAIN, UNSPECIFIED BACK PAIN LATERALITY, UNSPECIFIED CHRONICITY, UNSPECIFIED WHETHER SCIATICA PRESENT: ICD-10-CM

## 2021-08-13 RX ORDER — GABAPENTIN 100 MG/1
CAPSULE ORAL
Qty: 90 CAPSULE | Refills: 0 | Status: SHIPPED | OUTPATIENT
Start: 2021-08-13 | End: 2021-09-09

## 2021-08-16 ENCOUNTER — APPOINTMENT (OUTPATIENT)
Dept: PHYSICAL THERAPY | Facility: CLINIC | Age: 66
End: 2021-08-16
Payer: MEDICARE

## 2021-08-17 ENCOUNTER — OFFICE VISIT (OUTPATIENT)
Dept: PHYSICAL THERAPY | Facility: CLINIC | Age: 66
End: 2021-08-17
Payer: MEDICARE

## 2021-08-17 DIAGNOSIS — Z09 POSTOPERATIVE EXAMINATION: Primary | ICD-10-CM

## 2021-08-17 DIAGNOSIS — R29.898 WEAKNESS OF BOTH HIPS: ICD-10-CM

## 2021-08-17 PROCEDURE — 97112 NEUROMUSCULAR REEDUCATION: CPT

## 2021-08-17 PROCEDURE — 97110 THERAPEUTIC EXERCISES: CPT

## 2021-08-17 NOTE — PROGRESS NOTES
Daily Note     Today's date: 2021  Patient name: Lazara Zamora  : 1955  MRN: [id number]  Referring provider: Cecilio Brown MD  Dx:   Encounter Diagnosis     ICD-10-CM    1  Postoperative examination  Z09    2  Weakness of both hips  R29 898                   Subjective: Pt reports she has been having some pain in her back when she is bending forward  Other than that she is doing well  Objective: See treatment diary below      Assessment: Pt was advised to avoid excessive bending as this stresses the spine and breaks her BLT precautions  Pt also inquired about doing stair climbing as a form of exercise to which the PT explained that it shouldn't be an issue as long as she isn't running up and down, controlling the movement, and listening to her body for increases in symptoms  Pt required cueing for TrA contraction as she was recruiting rectus abdominus too much  Pt did well with all interventions however had minor discomfort with RDL when standing on the L LE  Pt was advised to still avoid over doing it and limiting how much weight she is using at her gym workout and avoid stressful lumbar motions  Patient demonstrated fatigue post treatment, exhibited good technique with therapeutic exercises and would benefit from continued PT      Plan: Continue per plan of care        Precautions: osteopenia, lumbar fusion                  Manuals                                                                 Neuro Re-Ed             TrA activation 10x5" HEP 10x5"           Pelvic tilts 10x5" HEP 10x5"           Bridge + TrA and ER  20x5" GTB HEP           SLS foam  2x30", steamboats x10 ea leg ea way                                                  Ther Ex             Supine 90/90 hamstring stretch 3x30" HEP            sidelying abduction x15 HEP 1x15 2#           Prone SLR x15 HEP 1x15            squats x15 HEP x10 10#           lunges x10 HEP walking 2x10 5# HEP           Planks, side planks 4x15" ea HEP 1' ea           Bike   6'           Bird dogs  x5 ea           RDLs  x10 ea                        Ther Activity                                       Gait Training                                       Modalities

## 2021-08-25 ENCOUNTER — HOSPITAL ENCOUNTER (OUTPATIENT)
Dept: MAMMOGRAPHY | Facility: CLINIC | Age: 66
Discharge: HOME | End: 2021-08-25
Payer: MEDICARE

## 2021-08-25 ENCOUNTER — TELEPHONE (OUTPATIENT)
Dept: NEUROSURGERY | Facility: CLINIC | Age: 66
End: 2021-08-25

## 2021-08-25 VITALS — WEIGHT: 145 LBS | BODY MASS INDEX: 24.16 KG/M2 | HEIGHT: 65 IN

## 2021-08-25 DIAGNOSIS — Z12.39 SCREENING FOR MALIGNANT NEOPLASM OF BREAST: ICD-10-CM

## 2021-08-25 PROCEDURE — 77063 BREAST TOMOSYNTHESIS BI: CPT

## 2021-08-25 PROCEDURE — 77067 SCR MAMMO BI INCL CAD: CPT

## 2021-08-25 NOTE — TELEPHONE ENCOUNTER
Patient called the nurseline inquiring when she can get dental work after her surgery  Returned the call  Patient did not answer  Left a voice message requesting for a call back  Patient does not need antibiotics if she gets dental work done after 9/7/21  Provided the office's phone number

## 2021-08-30 ENCOUNTER — OFFICE VISIT (OUTPATIENT)
Dept: PHYSICAL THERAPY | Facility: CLINIC | Age: 66
End: 2021-08-30
Payer: MEDICARE

## 2021-08-30 DIAGNOSIS — Z09 POSTOPERATIVE EXAMINATION: Primary | ICD-10-CM

## 2021-08-30 DIAGNOSIS — R29.898 WEAKNESS OF BOTH HIPS: ICD-10-CM

## 2021-08-30 PROCEDURE — 97112 NEUROMUSCULAR REEDUCATION: CPT

## 2021-08-30 PROCEDURE — 97110 THERAPEUTIC EXERCISES: CPT

## 2021-08-30 NOTE — PROGRESS NOTES
Daily Note     Today's date: 2021  Patient name: Akiko Lazaro  : 1955  MRN: [id number]  Referring provider: Marlon Bhatti MD  Dx:   Encounter Diagnosis     ICD-10-CM    1  Postoperative examination  Z09    2  Weakness of both hips  R29 898                   Subjective: pt reports she is doing well however is curious as to when she is able to begin to do more lifting and bending  Pt reports that lunges with 5# was too difficult and instead is doing them with 2 5# ankle weights  Objective: See treatment diary below      Assessment: Pt required bolster cueing to maintain neutral spine during bird dogs and reported that she felt her back muscles working harder  Pt had good form with RDLs following cueing for neutral spine as well  Pt tolerated total gym squats well having no pain and only minor fatigue following  Pt was instructed to contact her surgeon about when her precautions should be lifted  Patient demonstrated fatigue post treatment, exhibited good technique with therapeutic exercises and would benefit from continued PT      Plan: Continue per plan of care        Precautions: osteopenia, lumbar fusion                 Manuals                                                                 Neuro Re-Ed             TrA activation 10x5" HEP 10x5"           Pelvic tilts 10x5" HEP 10x5"           Bridge + TrA and ER  20x5" GTB HEP 20x5" BlkTB          SLS foam  2x30", steamboats x10 ea leg ea way                                                  Ther Ex             Supine 90/90 hamstring stretch 3x30" HEP            sidelying abduction x15 HEP 1x15 2#           Prone SLR x15 HEP 1x15            squats x15 HEP x10 10# 2x10 upside down bosu          lunges x10 HEP walking 2x10 5# HEP           Planks, side planks 4x15" ea HEP 1' ea           Bike   6' 5'          Bird dogs  x5 ea 2x5 ea bolster cue          RDLs  x10 ea 2x10 ea          SL straight arm extension   2x15 ea pallof press   2x15 BTB          Total gym sidelying squat   2x15           Ther Activity                                       Gait Training                                       Modalities

## 2021-08-31 NOTE — TELEPHONE ENCOUNTER
Received fax request from Dr Leora Fairchild office inquiring if the patient can undergo a cleaning on 9/8/21  Faxed letter to Dr Nereida Beck office stating the patient is cleared from a neurosurgerical perspective for dental cleanings

## 2021-09-01 ENCOUNTER — TELEPHONE (OUTPATIENT)
Dept: NEUROSURGERY | Facility: CLINIC | Age: 66
End: 2021-09-01

## 2021-09-01 NOTE — TELEPHONE ENCOUNTER
Called patient and notified her how this RN faxed the dental clearance to Dr Karime Graham office on 8/31/21  Patient is aware starting after 9/7/21 she is cleared from a neurosurgical perspective to get dental work since she will be greater than 3 months from her spinal surgery  Patient expressed understanding and was appreciative

## 2021-09-01 NOTE — TELEPHONE ENCOUNTER
----- Message from [facility]  Nicolasa sent at 9/1/2021 11:52 AM EDT -----  Regarding: Prescription Question  Contact: [phone]  My periodontist, Dr Lila Farmer, has tried to contact your office for information about antibiotics prior to a dental cleaning  The cleaning is scheduled for Wed, Sep 8  Could you please tell me what I need to do prior to dental work and please let both Dr Aurora Lemos [phone] and my regular dentist, Dr Beronica Alanis [phone], know    Thank you,  No Sauceda  [phone] cell text message ok  [phone] landline messages ok

## 2021-09-09 ENCOUNTER — ANNUAL EXAM (OUTPATIENT)
Dept: OBGYN CLINIC | Facility: CLINIC | Age: 66
End: 2021-09-09
Payer: MEDICARE

## 2021-09-09 VITALS
WEIGHT: 144 LBS | DIASTOLIC BLOOD PRESSURE: 64 MMHG | BODY MASS INDEX: 23.99 KG/M2 | SYSTOLIC BLOOD PRESSURE: 114 MMHG | HEIGHT: 65 IN

## 2021-09-09 DIAGNOSIS — Z01.419 ENCOUNTER FOR ANNUAL ROUTINE GYNECOLOGICAL EXAMINATION: Primary | ICD-10-CM

## 2021-09-09 DIAGNOSIS — Z12.31 ENCOUNTER FOR SCREENING MAMMOGRAM FOR MALIGNANT NEOPLASM OF BREAST: ICD-10-CM

## 2021-09-09 DIAGNOSIS — M81.0 AGE-RELATED OSTEOPOROSIS WITHOUT CURRENT PATHOLOGICAL FRACTURE: ICD-10-CM

## 2021-09-09 DIAGNOSIS — Z13.820 SCREENING FOR OSTEOPOROSIS: ICD-10-CM

## 2021-09-09 PROCEDURE — G0101 CA SCREEN;PELVIC/BREAST EXAM: HCPCS | Performed by: OBSTETRICS & GYNECOLOGY

## 2021-09-09 NOTE — PROGRESS NOTES
Assessment/Plan:     normal annual gynecological exam  Pap smear deferred secondary to normal Pap smear with negative Co testing done 2020 and age recommendations   Up-to-date with mammogram and given script for next year   History of osteopenia and would like to have a follow-up DEXA scan, script given  Believe she is up-to-date with colorectal screening but will check to see when her next colonoscopy is due   Return to office in 2 years earlier when needed  Problem List Items Addressed This Visit        Musculoskeletal and Integument    Age-related osteoporosis without current pathological fracture     Relevant Orders    DXA bone density spine hip and pelvis       Other    Encounter for annual routine gynecological examination - Primary    Encounter for screening mammogram for malignant neoplasm of breast    Relevant Orders    Mammo screening bilateral w 3d & cad      Other Visit Diagnoses     Screening for osteoporosis        Relevant Orders    DXA bone density spine hip and pelvis            Subjective:      Patient ID: Sheila Sabillon is a 77 y o  female  Darcy Creed is here for annual gyn exam - overall well - no vaginal bleeding or discharge - still having some issues with vaginal dryness and stopped ring - but not active at this time and having some stress in relationship - bowels and bladder - mild anal incontinence improved and up to date with colonoscopy - up to date with mammogram and pap -   Recent back surgery very sucessful and doing well       The following portions of the patient's history were reviewed and updated as appropriate:   She  has a past medical history of Disease of thyroid gland, Ectopic pregnancy (1984), High cholesterol, Hypothyroidism, Migraine, Miscarriage, Osteopenia, Peroneal tendonitis, and Varicella    She   Patient Active Problem List    Diagnosis Date Noted    Age-related osteoporosis without current pathological fracture  09/09/2021    Encounter for screening mammogram for malignant neoplasm of breast 09/09/2021    Stenosis of lateral recess of lumbar spine 04/20/2021    Spondylolisthesis 04/20/2021    Lumbar radiculopathy 04/20/2021    Lower back pain 11/19/2020    Facet arthropathy, lumbar 11/19/2020    Vaginal atrophy 05/30/2019    Encounter for annual routine gynecological examination 05/30/2019     She  has a past surgical history that includes Colon manometry; Dental surgery; Colonoscopy; Vaginal delivery; Nasal septum surgery; Ectopic pregnancy surgery; Dilation and curettage of uterus; Refractive surgery; and pr arthdsis post/posterolatrl/postinterbody lumbar (N/A, 6/7/2021)  Her family history includes Ankylosing spondylitis in her father; Arthritis in her father; Breast cancer in her paternal aunt; Cancer in her mother; Crohn's disease in her cousin; Diabetes in her paternal grandmother; Heart disease in her paternal grandfather; Hypertension in her mother and paternal grandfather; Lung cancer in her father; Lymphoma in her mother; Migraines in her mother; No Known Problems in her sister  She  reports that she has never smoked  She has never used smokeless tobacco  She reports previous alcohol use  She reports that she does not use drugs  Current Outpatient Medications   Medication Sig Dispense Refill    atorvastatin (LIPITOR) 10 mg tablet TAKE 1 TABLET BY MOUTH EVERY DAY AT NIGHT  1    cycloSPORINE (RESTASIS) 0 05 % ophthalmic emulsion Administer 1 drop to both eyes every 12 (twelve) hours       levothyroxine 100 mcg tablet Take 100 mcg by mouth daily  1    multivitamin (THERAGRAN) TABS Take 1 tablet by mouth daily      Vitamin D, Cholecalciferol, 1000 units CAPS Take 5,000 Units by mouth daily      Calcium 200 MG TABS Take by mouth daily       No current facility-administered medications for this visit       Current Outpatient Medications on File Prior to Visit   Medication Sig    atorvastatin (LIPITOR) 10 mg tablet TAKE 1 TABLET BY MOUTH EVERY DAY AT NIGHT    cycloSPORINE (RESTASIS) 0 05 % ophthalmic emulsion Administer 1 drop to both eyes every 12 (twelve) hours     levothyroxine 100 mcg tablet Take 100 mcg by mouth daily    multivitamin (THERAGRAN) TABS Take 1 tablet by mouth daily    Vitamin D, Cholecalciferol, 1000 units CAPS Take 5,000 Units by mouth daily    Calcium 200 MG TABS Take by mouth daily    [DISCONTINUED] acetaminophen (TYLENOL) 325 mg tablet Take 3 tablets (975 mg total) by mouth every 8 (eight) hours    [DISCONTINUED] butalbital-acetaminophen-caffeine (FIORICET,ESGIC) -40 mg per tablet Take 1 tablet by mouth every 6 (six) hours as needed (headaches)    [DISCONTINUED] docusate sodium (COLACE) 100 mg capsule Take 1 capsule (100 mg total) by mouth 2 (two) times a day    [DISCONTINUED] gabapentin (NEURONTIN) 100 mg capsule TAKE 1 CAPSULE(100 MG) BY MOUTH THREE TIMES DAILY    [DISCONTINUED] methocarbamol (ROBAXIN) 750 mg tablet Take 1 tablet (750 mg total) by mouth every 6 (six) hours    [DISCONTINUED] senna (SENOKOT) 8 6 mg Take 1 tablet (8 6 mg total) by mouth daily     No current facility-administered medications on file prior to visit  She is allergic to meloxicam     Review of Systems   Constitutional: Negative for chills, fatigue, fever and unexpected weight change  HENT: Negative for dental problem, sinus pressure and sinus pain  Eyes: Negative for visual disturbance  Respiratory: Negative for cough, shortness of breath and wheezing  Cardiovascular: Negative for chest pain and leg swelling  Gastrointestinal: Negative for constipation, diarrhea, nausea and vomiting  Genitourinary: Negative for urgency  Musculoskeletal: Negative for back pain and joint swelling  Allergic/Immunologic: Negative for environmental allergies  Neurological: Negative for dizziness and headaches  Psychiatric/Behavioral: The patient is not nervous/anxious            Objective:      /64 (BP Location: Left arm, Patient Position: Sitting, Cuff Size: Standard)   Ht 5' 5" (1 651 m)   Wt 65 3 kg (144 lb)   BMI 23 96 kg/m²          Physical Exam  Vitals reviewed  Constitutional:       General: She is not in acute distress  Appearance: Normal appearance  She is normal weight  She is not ill-appearing  Comments: Youthful white female    HENT:      Head: Normocephalic and atraumatic  Neck:      Thyroid: No thyromegaly or thyroid tenderness  Cardiovascular:      Rate and Rhythm: Normal rate and regular rhythm  Pulses: Normal pulses  Heart sounds: Normal heart sounds  No murmur heard  Pulmonary:      Effort: Pulmonary effort is normal  No respiratory distress  Breath sounds: Normal breath sounds  No wheezing  Chest:      Breasts: Breasts are symmetrical          Right: Normal  No swelling, inverted nipple, mass, nipple discharge, skin change or tenderness  Left: Normal  No swelling, inverted nipple, mass, nipple discharge, skin change or tenderness  Abdominal:      General: Abdomen is flat  There is no distension  Palpations: Abdomen is soft  There is no mass  Tenderness: There is no abdominal tenderness  There is no right CVA tenderness, left CVA tenderness, guarding or rebound  Hernia: No hernia is present  Genitourinary:     Comments: Normal female, no vulvar or vaginal lesions but is atrophic , [place]'s and [place] glands are grossly normal   Urethra is in the midline and normal appearance  Cervix is grossly normal appearance and Pap smear is taken  Uterus is anterior grossly normal in size, shape and mobility  There are no adnexal masses  The exam is nontender  Rectal exam reveals normal sphincter tone, no palpable masses and stool is guaiac negative  Musculoskeletal:      Cervical back: Neck supple  No muscular tenderness  Lymphadenopathy:      Upper Body:      Right upper body: No supraclavicular, axillary or pectoral adenopathy        Left upper body: No supraclavicular, axillary or pectoral adenopathy  Neurological:      General: No focal deficit present  Mental Status: She is alert and oriented to person, place, and time     Psychiatric:         Mood and Affect: Mood normal          Behavior: Behavior normal

## 2021-09-09 NOTE — PATIENT INSTRUCTIONS

## 2021-09-13 ENCOUNTER — OFFICE VISIT (OUTPATIENT)
Dept: PHYSICAL THERAPY | Facility: CLINIC | Age: 66
End: 2021-09-13
Payer: MEDICARE

## 2021-09-13 DIAGNOSIS — Z09 POSTOPERATIVE EXAMINATION: Primary | ICD-10-CM

## 2021-09-13 DIAGNOSIS — R29.898 WEAKNESS OF BOTH HIPS: ICD-10-CM

## 2021-09-13 PROCEDURE — 97112 NEUROMUSCULAR REEDUCATION: CPT

## 2021-09-13 PROCEDURE — 97110 THERAPEUTIC EXERCISES: CPT

## 2021-09-13 NOTE — PROGRESS NOTES
Daily Note     Today's date: 2021  Patient name: Deepa Denny  : 1955  MRN: [id number]  Referring provider: Paz Domingo MD  Dx:   Encounter Diagnosis     ICD-10-CM    1  Postoperative examination  Z09    2  Weakness of both hips  R29 898                   Subjective: Pt reports her back is doing well  Pt asked about what exercises she was able to continue doing at the gym and when she would be able to stop following her precautions  Objective: See treatment diary below      Assessment: Pt demonstrated trunk rotation when performing bird dogs which was corrected when placing a bolster across her lower back  Pt demonstrated trunk rotation with single leg RDLs as well on the left leg which was corrected with tactile cue on lower back  Pt was informed to make sure she tightens her core and does not rush through exercises  Pt instructed to contact her surgeon to discuss whether she should still be following her precautions or not, or whether they can be updated  Patient demonstrated fatigue post treatment, exhibited good technique with therapeutic exercises and would benefit from continued PT     The patient was treated by FRANCHESCA Huerta under direct supervision of Jerzy Heller DPT        Plan: Continue per plan of care        Precautions: osteopenia, lumbar fusion                Manuals                                                                 Neuro Re-Ed             TrA activation 10x5" HEP 10x5"           Pelvic tilts 10x5" HEP 10x5"           Bridge + TrA and ER  20x5" GTB HEP 20x5" BlkTB 20x on pball         SLS foam  2x30", steamboats x10 ea leg ea way                                                  Ther Ex             Supine 90/90 hamstring stretch 3x30" HEP            sidelying abduction x15 HEP 1x15 2#           Prone SLR x15 HEP 1x15            squats x15 HEP x10 10# 2x10 upside down bosu 2 x10 upside down bosu          lunges x10 HEP walking 2x10 5# HEP  On long foam 10x laps         Planks, side planks 4x15" ea HEP 1' ea           Bike   6' 5'          Bird dogs  x5 ea 2x5 ea bolster cue 2x5 ea 2 5# w bolster cue         SL RDLs   x10 ea 2x10 ea  2 x 10 ea 5# DB          SL straight arm extension   2x15 ea  2x15 ea BlkTB         pallof press   2x15 BTB 2x10 BTB with walk out          Total gym sidelying squat   2x15  2x10 ea SL         Ther Activity             Retro walking    BlkTB 10x                      Gait Training                                       Modalities

## 2021-09-14 PROBLEM — M54.9 MID BACK PAIN: Status: ACTIVE | Noted: 2021-09-14

## 2021-09-14 PROBLEM — M47.812 SPONDYLOSIS OF CERVICAL REGION WITHOUT MYELOPATHY OR RADICULOPATHY: Status: ACTIVE | Noted: 2021-09-14

## 2021-09-27 ENCOUNTER — OFFICE VISIT (OUTPATIENT)
Dept: PHYSICAL THERAPY | Facility: CLINIC | Age: 66
End: 2021-09-27
Payer: MEDICARE

## 2021-09-27 DIAGNOSIS — Z09 POSTOPERATIVE EXAMINATION: Primary | ICD-10-CM

## 2021-09-27 DIAGNOSIS — R29.898 WEAKNESS OF BOTH HIPS: ICD-10-CM

## 2021-09-27 PROCEDURE — 97112 NEUROMUSCULAR REEDUCATION: CPT

## 2021-09-27 PROCEDURE — 97110 THERAPEUTIC EXERCISES: CPT

## 2021-09-27 NOTE — PROGRESS NOTES
Daily Note     Today's date: 2021  Patient name: Aj Dimas  : 1955  MRN: [id number]  Referring provider: Glenny Eric MD  Dx:   Encounter Diagnosis     ICD-10-CM    1  Postoperative examination  Z09    2  Weakness of both hips  R29 898        Start Time: 830  Stop Time: 923  Total time in clinic (min): 53 minutes    Subjective: Pt reports her back has been doing well  She contacted her surgeon about her precautions and they informed her PT could decide when to progress her  They also updated her script to include her neck  Objective: See treatment diary below      Assessment:  Pt was able to progress from bridges on physioball to single leg bridges with good form and no increase in symptoms  Knee drives were added to the lunges on the foam and pt demonstrated increased fatigue after these exercises  Pt demonstrated trunk rotation while performing single leg RDLs that were corrected with tactile cuing at her hips and not flexing forward as far  Pt was able to perform box lifts with good form  Pt was given lower trunk rotation to begin working into lumbar rotation which patient reported minor pull in her low back but no true pain  Patient demonstrated fatigue post treatment, exhibited good technique with therapeutic exercises and would benefit from continued PT     The patient was treated by FRANCHESCA Monterroso under direct supervision of Solomon Delacruz DPT  All treatment and POC decisions were made by the supervising PT  Plan: Continue per plan of care        Precautions: osteopenia, lumbar fusion               Manuals                                                                 Neuro Re-Ed             TrA activation 10x5" HEP 10x5"           Pelvic tilts 10x5" HEP 10x5"           Bridge + TrA and ER  20x5" GTB HEP 20x5" BlkTB 20x on pball 10x on p-ball; SL 10x ea        SLS foam  2x30", steamboats x10 ea leg ea way Ther Ex             Supine 90/90 hamstring stretch 3x30" HEP            sidelying abduction x15 HEP 1x15 2#           Prone SLR x15 HEP 1x15            squats x15 HEP x10 10# 2x10 upside down bosu 2 x10 upside down bosu  2 x10 upside down bosu         lunges x10 HEP walking 2x10 5# HEP  On long foam 10x laps On long foam 10x laps        Planks, side planks 4x15" ea HEP 1' ea           Bike   6' 5'  6'        Bird dogs  x5 ea 2x5 ea bolster cue 2x5 ea 2 5# w bolster cue 2x5 ea 2 5# w bolster cue        SL RDLs   x10 ea 2x10 ea  2 x 10 ea 5# DB  2 x 10 ea 5# DB         SL straight arm extension   2x15 ea  2x15 ea BlkTB         pallof press   2x15 BTB 2x10 BTB with walk out          Total gym sidelying squat   2x15  2x10 ea SL         Lower trunk rotation     10x3" ea                                               Ther Activity             Retro walking    BlkTB 10x         Box lifts      30# 10x; 40# 5x        Gait Training                                       Modalities

## 2021-10-05 ENCOUNTER — OFFICE VISIT (OUTPATIENT)
Dept: PHYSICAL THERAPY | Facility: CLINIC | Age: 66
End: 2021-10-05
Payer: MEDICARE

## 2021-10-05 DIAGNOSIS — Z09 POSTOPERATIVE EXAMINATION: Primary | ICD-10-CM

## 2021-10-05 DIAGNOSIS — R29.898 WEAKNESS OF BOTH HIPS: ICD-10-CM

## 2021-10-05 PROCEDURE — 97110 THERAPEUTIC EXERCISES: CPT

## 2021-10-05 PROCEDURE — 97112 NEUROMUSCULAR REEDUCATION: CPT

## 2021-10-11 ENCOUNTER — APPOINTMENT (OUTPATIENT)
Dept: PHYSICAL THERAPY | Facility: CLINIC | Age: 66
End: 2021-10-11
Payer: MEDICARE

## 2021-10-25 ENCOUNTER — APPOINTMENT (OUTPATIENT)
Dept: PHYSICAL THERAPY | Facility: CLINIC | Age: 66
End: 2021-10-25
Payer: MEDICARE

## 2021-10-26 ENCOUNTER — EVALUATION (OUTPATIENT)
Dept: PHYSICAL THERAPY | Facility: CLINIC | Age: 66
End: 2021-10-26
Payer: MEDICARE

## 2021-10-26 ENCOUNTER — TELEPHONE (OUTPATIENT)
Dept: NEUROSURGERY | Facility: CLINIC | Age: 66
End: 2021-10-26

## 2021-10-26 ENCOUNTER — APPOINTMENT (OUTPATIENT)
Dept: RADIOLOGY | Facility: CLINIC | Age: 66
End: 2021-10-26
Payer: MEDICARE

## 2021-10-26 ENCOUNTER — TELEPHONE (OUTPATIENT)
Dept: OBGYN CLINIC | Facility: CLINIC | Age: 66
End: 2021-10-26

## 2021-10-26 DIAGNOSIS — M54.9 MID BACK PAIN: ICD-10-CM

## 2021-10-26 DIAGNOSIS — Z09 POSTOPERATIVE EXAMINATION: ICD-10-CM

## 2021-10-26 DIAGNOSIS — M47.812 SPONDYLOSIS OF CERVICAL REGION WITHOUT MYELOPATHY OR RADICULOPATHY: ICD-10-CM

## 2021-10-26 PROCEDURE — 72120 X-RAY BEND ONLY L-S SPINE: CPT

## 2021-10-26 PROCEDURE — 97110 THERAPEUTIC EXERCISES: CPT

## 2021-10-28 ENCOUNTER — OFFICE VISIT (OUTPATIENT)
Dept: NEUROSURGERY | Facility: CLINIC | Age: 66
End: 2021-10-28
Payer: MEDICARE

## 2021-10-28 VITALS
RESPIRATION RATE: 16 BRPM | SYSTOLIC BLOOD PRESSURE: 130 MMHG | HEIGHT: 65 IN | DIASTOLIC BLOOD PRESSURE: 76 MMHG | TEMPERATURE: 98.1 F | WEIGHT: 140 LBS | BODY MASS INDEX: 23.32 KG/M2 | HEART RATE: 62 BPM

## 2021-10-28 DIAGNOSIS — Z98.1 ENCOUNTER FOR POSTOPERATIVE CARE RELATED TO SURGICAL JOINT FUSION: Primary | ICD-10-CM

## 2021-10-28 DIAGNOSIS — Z48.89 ENCOUNTER FOR POSTOPERATIVE CARE RELATED TO SURGICAL JOINT FUSION: Primary | ICD-10-CM

## 2021-10-28 PROCEDURE — 99212 OFFICE O/P EST SF 10 MIN: CPT | Performed by: NEUROLOGICAL SURGERY

## 2021-11-03 ENCOUNTER — EVALUATION (OUTPATIENT)
Dept: PHYSICAL THERAPY | Facility: CLINIC | Age: 66
End: 2021-11-03
Payer: MEDICARE

## 2021-11-03 DIAGNOSIS — R29.898 WEAKNESS OF BOTH HIPS: ICD-10-CM

## 2021-11-03 DIAGNOSIS — M47.812 SPONDYLOSIS OF CERVICAL REGION WITHOUT MYELOPATHY OR RADICULOPATHY: Primary | ICD-10-CM

## 2021-11-03 DIAGNOSIS — M54.9 MID BACK PAIN: ICD-10-CM

## 2021-11-03 DIAGNOSIS — Z09 POSTOPERATIVE EXAMINATION: ICD-10-CM

## 2021-11-03 PROCEDURE — 97112 NEUROMUSCULAR REEDUCATION: CPT

## 2021-11-03 PROCEDURE — 97161 PT EVAL LOW COMPLEX 20 MIN: CPT

## 2021-11-03 PROCEDURE — 97110 THERAPEUTIC EXERCISES: CPT

## 2021-11-08 ENCOUNTER — OFFICE VISIT (OUTPATIENT)
Dept: PHYSICAL THERAPY | Facility: CLINIC | Age: 66
End: 2021-11-08
Payer: MEDICARE

## 2021-11-08 DIAGNOSIS — Z09 POSTOPERATIVE EXAMINATION: ICD-10-CM

## 2021-11-08 DIAGNOSIS — M47.812 SPONDYLOSIS OF CERVICAL REGION WITHOUT MYELOPATHY OR RADICULOPATHY: Primary | ICD-10-CM

## 2021-11-08 DIAGNOSIS — M54.9 MID BACK PAIN: ICD-10-CM

## 2021-11-08 DIAGNOSIS — R29.898 WEAKNESS OF BOTH HIPS: ICD-10-CM

## 2021-11-08 PROCEDURE — 97110 THERAPEUTIC EXERCISES: CPT

## 2021-11-08 PROCEDURE — 97140 MANUAL THERAPY 1/> REGIONS: CPT

## 2021-11-08 PROCEDURE — 97112 NEUROMUSCULAR REEDUCATION: CPT

## 2021-11-15 ENCOUNTER — OFFICE VISIT (OUTPATIENT)
Dept: PHYSICAL THERAPY | Facility: CLINIC | Age: 66
End: 2021-11-15
Payer: MEDICARE

## 2021-11-15 DIAGNOSIS — R29.898 WEAKNESS OF BOTH HIPS: ICD-10-CM

## 2021-11-15 DIAGNOSIS — M54.9 MID BACK PAIN: ICD-10-CM

## 2021-11-15 DIAGNOSIS — M47.812 SPONDYLOSIS OF CERVICAL REGION WITHOUT MYELOPATHY OR RADICULOPATHY: Primary | ICD-10-CM

## 2021-11-15 DIAGNOSIS — Z09 POSTOPERATIVE EXAMINATION: ICD-10-CM

## 2021-11-15 PROCEDURE — 97140 MANUAL THERAPY 1/> REGIONS: CPT

## 2021-11-15 PROCEDURE — 97112 NEUROMUSCULAR REEDUCATION: CPT

## 2021-11-15 PROCEDURE — 97110 THERAPEUTIC EXERCISES: CPT

## 2021-11-22 ENCOUNTER — APPOINTMENT (OUTPATIENT)
Dept: PHYSICAL THERAPY | Facility: CLINIC | Age: 66
End: 2021-11-22
Payer: MEDICARE

## 2021-11-29 ENCOUNTER — APPOINTMENT (OUTPATIENT)
Dept: PHYSICAL THERAPY | Facility: CLINIC | Age: 66
End: 2021-11-29
Payer: MEDICARE

## 2022-03-01 ENCOUNTER — HOSPITAL ENCOUNTER (OUTPATIENT)
Dept: BONE DENSITY | Facility: CLINIC | Age: 67
Discharge: HOME | End: 2022-03-01
Payer: MEDICARE

## 2022-03-01 DIAGNOSIS — M81.0 AGE-RELATED OSTEOPOROSIS WITHOUT CURRENT PATHOLOGICAL FRACTURE: ICD-10-CM

## 2022-03-01 DIAGNOSIS — Z13.820 SCREENING FOR OSTEOPOROSIS: ICD-10-CM

## 2022-03-01 PROCEDURE — 77080 DXA BONE DENSITY AXIAL: CPT

## 2022-06-07 ENCOUNTER — TELEPHONE (OUTPATIENT)
Dept: OBGYN CLINIC | Facility: CLINIC | Age: 67
End: 2022-06-07

## 2022-06-07 DIAGNOSIS — N95.2 VAGINAL ATROPHY: Primary | ICD-10-CM

## 2022-06-07 NOTE — TELEPHONE ENCOUNTER
Pt would like 2 rx refilled to her [facility] on [address]  estring and estradiol cream  Pt aware will send and call her if there are any issues

## 2022-06-08 RX ORDER — ESTRADIOL 2 MG/1
2 RING VAGINAL
Qty: 1 EACH | Refills: 3 | Status: SHIPPED | OUTPATIENT
Start: 2022-06-08

## 2022-06-08 RX ORDER — ESTRADIOL 0.1 MG/G
0.5 CREAM VAGINAL 2 TIMES WEEKLY
Qty: 42.5 G | Refills: 0 | Status: SHIPPED | OUTPATIENT
Start: 2022-06-09

## 2022-06-08 NOTE — TELEPHONE ENCOUNTER
Received confirmation that medications as previously sent to provider to sign were approved by provider and sent to pt pharmacy 6/8/22

## 2022-09-06 ENCOUNTER — HOSPITAL ENCOUNTER (OUTPATIENT)
Dept: MAMMOGRAPHY | Facility: CLINIC | Age: 67
Discharge: HOME | End: 2022-09-06
Payer: MEDICARE

## 2022-09-06 VITALS — WEIGHT: 140 LBS | HEIGHT: 65 IN | BODY MASS INDEX: 23.32 KG/M2

## 2022-09-06 DIAGNOSIS — Z12.31 ENCOUNTER FOR SCREENING MAMMOGRAM FOR MALIGNANT NEOPLASM OF BREAST: ICD-10-CM

## 2022-09-06 PROCEDURE — 77063 BREAST TOMOSYNTHESIS BI: CPT

## 2022-09-06 PROCEDURE — 77067 SCR MAMMO BI INCL CAD: CPT

## 2022-09-30 DIAGNOSIS — N95.2 VAGINAL ATROPHY: ICD-10-CM

## 2022-10-06 RX ORDER — ESTRADIOL 2 MG/1
2 RING VAGINAL
Qty: 1 EACH | Refills: 3 | Status: SHIPPED | OUTPATIENT
Start: 2022-10-06

## 2022-11-03 ENCOUNTER — APPOINTMENT (OUTPATIENT)
Dept: LAB | Facility: CLINIC | Age: 67
End: 2022-11-03

## 2022-12-05 ENCOUNTER — ANNUAL EXAM (OUTPATIENT)
Dept: OBGYN CLINIC | Facility: CLINIC | Age: 67
End: 2022-12-05

## 2022-12-05 VITALS
SYSTOLIC BLOOD PRESSURE: 118 MMHG | BODY MASS INDEX: 23.49 KG/M2 | WEIGHT: 141 LBS | HEIGHT: 65 IN | DIASTOLIC BLOOD PRESSURE: 78 MMHG

## 2022-12-05 DIAGNOSIS — N95.2 VAGINAL ATROPHY: ICD-10-CM

## 2022-12-05 DIAGNOSIS — Z12.31 ENCOUNTER FOR SCREENING MAMMOGRAM FOR MALIGNANT NEOPLASM OF BREAST: Primary | ICD-10-CM

## 2022-12-05 RX ORDER — ESTRADIOL 2 MG/1
2 RING VAGINAL
Qty: 1 EACH | Refills: 3 | Status: SHIPPED | OUTPATIENT
Start: 2022-12-05

## 2022-12-05 NOTE — PROGRESS NOTES
Saji Calle is a 79 y o   female who presents for annual well woman exam   Patient visit last year was not covered and she check with Medicare and this year will be covered  Patient had recent yeast infection she is not sure of what the triggering factor was she treated with some Monistat and it seems to be resolving  During that time she took her Estring out  Patient has some significant scar tissue from prior birth trauma using the Estring for atrophic vaginitis and during time of intercourse seems to displace the scar tissue and keeps it from ripping open and bleeding  Patient did notice some mood swings when 1st starting Estring but these have settled and decreased  Patient reports no bleeding, spotting, or discharge  Patient reports No hot flashes/night sweats, pain problems intercourse mostly resolved with Estring not needing additional cream, Yes  vaginal dryness, sleeping fairly well  Patient does have a history of IBS which is often triggered by stress and environmental factors such as caffeine  Patient's  is undergoing further cardiac evaluation but has some non treatable cardiac disease  Current contraception: post menopausal status  History of abnormal Pap smear: no  Family history of uterine or ovarian cancer: no  Regular self breast exam: yes  History of abnormal mammogram: no  Family history of breast cancer: yes - paternal aunt    Menstrual History:  OB History        4    Para   2    Term   2            AB   2    Living           SAB   1    IAB        Ectopic   1    Multiple        Live Births                    No LMP recorded   Patient is postmenopausal        The following portions of the patient's history were reviewed and updated as appropriate: allergies, current medications, past family history, past medical history, past social history, past surgical history and problem list   Past Medical History:   Diagnosis Date   • Disease of thyroid gland    • Ectopic pregnancy    • High cholesterol    • Hypothyroidism    • Migraine     During perimenopause, not a problem now   • Miscarriage     ectopic , miscarriage    • Osteopenia    • Peroneal tendonitis    • Varicella     Date unknown     Past Surgical History:   Procedure Laterality Date   • COLON MANOMETRY     • COLONOSCOPY     • DENTAL SURGERY      removal of broken tooth and implant to be done   • DILATION AND CURETTAGE OF UTERUS     • ECTOPIC PREGNANCY SURGERY     • NASAL SEPTUM SURGERY     • LA ARTHDSIS POST/POSTEROLATRL/POSTINTERBODY LUMBAR N/A 2021    Procedure: Right L4-5 transverse lumbar interbody fusion and decompression;  Surgeon: Andra Hayes MD;  [facility];  Service: Neurosurgery   • REFRACTIVE SURGERY     • VAGINAL DELIVERY       OB History        4    Para   2    Term   2            AB   2    Living           SAB   1    IAB        Ectopic   1    Multiple        Live Births                     Review of Systems  Review of Systems   Constitutional: Negative  Negative for chills, fatigue, fever and unexpected weight change  HENT: Negative  Negative for dental problem, sinus pressure and sinus pain  Sinus head aches   Eyes: Negative for visual disturbance  Dry eyes   Respiratory: Negative  Negative for cough, shortness of breath and wheezing  Cardiovascular: Negative for chest pain and leg swelling  Gastrointestinal: Positive for constipation and diarrhea  Negative for nausea and vomiting  IBS   Genitourinary: Positive for vaginal discharge  Negative for menstrual problem, pelvic pain and urgency  Vaginal itching   Musculoskeletal: Negative  Negative for back pain  Allergic/Immunologic: Positive for environmental allergies  Neurological: Negative  Negative for dizziness and headaches       Objective   Vitals:    22 1309   BP: 118/78   BP Location: Left arm   Patient Position: Sitting   Cuff Size: Standard   Weight: 64 kg (141 lb)   Height: 5' 5" (1 651 m)       General:   Alert and oriented in no acute distress   Heart: regular rate and rhythm, S1, S2 normal, no murmur, click, rub or gallop   Lungs: clear to auscultation bilaterally   Abdomen: soft, non-tender, without masses or organomegaly   Vulva: normal, atrophic with linear scar visible in the posterior perineum   Vagina: normal mucosa, normal discharge, no acute signs of infection no erythema appropriately atrophic   Cervix: no cervical motion tenderness and no lesions   Uterus: normal size, mobile, non-tender   Adnexa: normal adnexa and no mass, fullness, tenderness   Breast inspection negative, no nipple discharge or bleeding, no masses or nodularity palpable  Rectal deferred, normal Cologuard 2022  Thyroid normal no masses or nodules    Assessment   79year-old  here for annual exam normal Pap 2020, normal mammogram 2022, normal Cologuard 2022, 2022 DEXA scan low-density     Plan   Renewed Estring, yeast infection seems resolved at this time    Given slip for mammogram return in 1 year for follow-up of atrophic vaginitis

## 2023-01-23 ENCOUNTER — OFFICE VISIT (OUTPATIENT)
Dept: INTERNAL MEDICINE CLINIC | Facility: CLINIC | Age: 68
End: 2023-01-23

## 2023-01-23 VITALS
HEIGHT: 65 IN | OXYGEN SATURATION: 98 % | RESPIRATION RATE: 14 BRPM | WEIGHT: 145.2 LBS | BODY MASS INDEX: 24.19 KG/M2 | TEMPERATURE: 96.6 F | SYSTOLIC BLOOD PRESSURE: 108 MMHG | HEART RATE: 66 BPM | DIASTOLIC BLOOD PRESSURE: 64 MMHG

## 2023-01-23 DIAGNOSIS — Z11.59 NEED FOR HEPATITIS C SCREENING TEST: ICD-10-CM

## 2023-01-23 DIAGNOSIS — E55.9 VITAMIN D DEFICIENCY: ICD-10-CM

## 2023-01-23 DIAGNOSIS — E78.2 MIXED HYPERLIPIDEMIA: Primary | ICD-10-CM

## 2023-01-23 DIAGNOSIS — J30.1 SEASONAL ALLERGIC RHINITIS DUE TO POLLEN: ICD-10-CM

## 2023-01-23 DIAGNOSIS — F33.8 SEASONAL DEPRESSION (HCC): ICD-10-CM

## 2023-01-23 DIAGNOSIS — K58.0 IRRITABLE BOWEL SYNDROME WITH DIARRHEA: ICD-10-CM

## 2023-01-23 DIAGNOSIS — Z00.00 MEDICARE ANNUAL WELLNESS VISIT, SUBSEQUENT: ICD-10-CM

## 2023-01-23 DIAGNOSIS — M85.89 OSTEOPENIA OF MULTIPLE SITES: ICD-10-CM

## 2023-01-23 DIAGNOSIS — E03.9 ACQUIRED HYPOTHYROIDISM: ICD-10-CM

## 2023-01-23 PROBLEM — Z01.419 ENCOUNTER FOR ANNUAL ROUTINE GYNECOLOGICAL EXAMINATION: Status: RESOLVED | Noted: 2019-05-30 | Resolved: 2023-01-23

## 2023-01-23 PROBLEM — G89.29 CHRONIC LOW BACK PAIN: Status: ACTIVE | Noted: 2020-02-06

## 2023-01-23 PROBLEM — M54.9 MID BACK PAIN: Status: RESOLVED | Noted: 2021-09-14 | Resolved: 2023-01-23

## 2023-01-23 PROBLEM — N95.2 VAGINAL ATROPHY: Status: RESOLVED | Noted: 2019-05-30 | Resolved: 2023-01-23

## 2023-01-23 PROBLEM — H90.5 CONGENITAL DEAFNESS: Status: ACTIVE | Noted: 2021-09-21

## 2023-01-23 PROBLEM — M54.50 CHRONIC LOW BACK PAIN: Status: RESOLVED | Noted: 2020-02-06 | Resolved: 2023-01-23

## 2023-01-23 PROBLEM — M81.0 AGE-RELATED OSTEOPOROSIS WITHOUT CURRENT PATHOLOGICAL FRACTURE: Status: RESOLVED | Noted: 2021-09-09 | Resolved: 2023-01-23

## 2023-01-23 PROBLEM — Z98.1 ENCOUNTER FOR POSTOPERATIVE CARE RELATED TO SURGICAL JOINT FUSION: Status: RESOLVED | Noted: 2021-10-28 | Resolved: 2023-01-23

## 2023-01-23 PROBLEM — Z12.31 ENCOUNTER FOR SCREENING MAMMOGRAM FOR MALIGNANT NEOPLASM OF BREAST: Status: RESOLVED | Noted: 2021-09-09 | Resolved: 2023-01-23

## 2023-01-23 PROBLEM — M54.50 CHRONIC LOW BACK PAIN: Status: ACTIVE | Noted: 2020-02-06

## 2023-01-23 PROBLEM — G89.29 CHRONIC LOW BACK PAIN: Status: RESOLVED | Noted: 2020-02-06 | Resolved: 2023-01-23

## 2023-01-23 PROBLEM — Z48.89 ENCOUNTER FOR POSTOPERATIVE CARE RELATED TO SURGICAL JOINT FUSION: Status: RESOLVED | Noted: 2021-10-28 | Resolved: 2023-01-23

## 2023-01-23 NOTE — PROGRESS NOTES
Assessment and Plan:   Patient was advised to follow a low fat diet and continue the atorvastatin 10 mg daily  Was advised to continue the levothyroxine 100 mcg daily and check a TSH level  Continue vitamin D supplement 5000 units daily  Has seasonal depression but does not take any medication for it  Has seasonal allergies for which she was told to take the Flonase nasal spray during the change of season regularly  She says she has IBS with diarrhea and I told her to see GI  Continue calcium for the osteopenia      Problem List Items Addressed This Visit        Endocrine    Acquired hypothyroidism       Musculoskeletal and Integument    Osteopenia of multiple sites       Other    Vitamin D deficiency    Relevant Orders    Vitamin D 25 hydroxy    Seasonal depression ([facility] [address] )    Mixed hyperlipidemia - Primary    Relevant Orders    CBC and differential    Comprehensive metabolic panel    Lipid panel    TSH, 3rd generation   Other Visit Diagnoses     Medicare annual wellness visit, subsequent        Need for hepatitis C screening test        Relevant Orders    Hepatitis C Antibody ([facility])           Preventive health issues were discussed with patient, and age appropriate screening tests were ordered as noted in patient's After Visit Summary  Personalized health advice and appropriate referrals for health education or preventive services given if needed, as noted in patient's After Visit Summary  History of Present Illness:     Patient presents for a Medicare Wellness Visit    HPI  This is a new patient was here to establish    Patient Care Team:  Tessie Rivera MD as PCP - General (Internal Medicine)     Review of Systems:     Review of Systems   Constitutional: Negative for chills, diaphoresis, fatigue and fever  HENT: Negative for congestion, ear discharge, ear pain, hearing loss, postnasal drip, rhinorrhea, sinus pressure, sinus pain, sneezing, sore throat and voice change      Eyes: Negative for pain, discharge, redness and visual disturbance  Respiratory: Negative for cough, chest tightness, shortness of breath and wheezing  Cardiovascular: Negative for chest pain, palpitations and leg swelling  Gastrointestinal: Negative for abdominal distention, abdominal pain, blood in stool, constipation, diarrhea, nausea and vomiting  Endocrine: Negative for cold intolerance, heat intolerance, polydipsia, polyphagia and polyuria  Genitourinary: Negative for dysuria, flank pain, frequency, hematuria and urgency  Musculoskeletal: Negative for arthralgias, back pain, gait problem, joint swelling, myalgias, neck pain and neck stiffness  Skin: Negative for rash  Neurological: Negative for dizziness, tremors, syncope, facial asymmetry, speech difficulty, weakness, light-headedness, numbness and headaches  Hematological: Does not bruise/bleed easily  Psychiatric/Behavioral: Negative for behavioral problems, confusion and sleep disturbance  The patient is not nervous/anxious           Problem List:     Patient Active Problem List   Diagnosis   • Facet arthropathy, lumbar   • Stenosis of lateral recess of lumbar spine   • Spondylolisthesis   • Lumbar radiculopathy   • Spondylosis of cervical region without myelopathy or radiculopathy   • Congenital deafness   • Vitamin D deficiency   • Osteopenia of multiple sites   • Seasonal depression ([facility] [address] )   • Acquired hypothyroidism   • Mixed hyperlipidemia      Past Medical and Surgical History:     Past Medical History:   Diagnosis Date   • Age-related osteoporosis without current pathological fracture  9/9/2021   • Chronic low back pain 2/6/2020   • Disease of thyroid gland    • Ectopic pregnancy 1984   • Encounter for annual routine gynecological examination 5/30/2019   • Encounter for postoperative care related to surgical joint fusion 10/28/2021   • Encounter for screening mammogram for malignant neoplasm of breast 9/9/2021   • High cholesterol    • Hypothyroidism    • Mid back pain 9/14/2021   • Migraine     During perimenopause, not a problem now   • Miscarriage     ectopic 1984, miscarriage 1992   • Osteopenia    • Peroneal tendonitis    • Vaginal atrophy 5/30/2019   • Varicella     Date unknown     Past Surgical History:   Procedure Laterality Date   • COLON MANOMETRY     • COLONOSCOPY     • DENTAL SURGERY      removal of broken tooth and implant to be done   • DILATION AND CURETTAGE OF UTERUS     • ECTOPIC PREGNANCY SURGERY     • NASAL SEPTUM SURGERY     • NV ARTHRODESIS COMBINED TQ 1NTRSPC LUMBAR N/A 6/7/2021    Procedure: Right L4-5 transverse lumbar interbody fusion and decompression;  Surgeon: Dorita Coburn MD;  Location[facility];  Service: Neurosurgery   • REFRACTIVE SURGERY     • VAGINAL DELIVERY        Family History:     Family History   Problem Relation Age of Onset   • Lymphoma Mother    • Cancer Mother         Lymphoma   • Hypertension Mother    • Migraines Mother    • Ankylosing spondylitis Father    • Arthritis Father    • Lung cancer Father    • No Known Problems Sister    • Diabetes Paternal Grandmother    • Heart disease Paternal Grandfather    • Hypertension Paternal Grandfather    • Breast cancer Paternal Aunt    • Crohn's disease Cousin       Social History:     Social History     Socioeconomic History   • Marital status: /Civil Union     Spouse name: None   • Number of children: None   • Years of education: None   • Highest education level: None   Occupational History   • None   Tobacco Use   • Smoking status: Never   • Smokeless tobacco: Never   Vaping Use   • Vaping Use: Never used   Substance and Sexual Activity   • Alcohol use: Not Currently     Comment: Less than one per month   • Drug use: Never   • Sexual activity: Yes     Partners: Male     Birth control/protection: Post-menopausal     Comment: pt declines hiv std testing   Other Topics Concern   • None   Social History Narrative   • None     Social Determinants of Health     Financial Resource Strain: Low Risk    • Difficulty of Paying Living Expenses: Not hard at all   Food Insecurity: Not on file   Transportation Needs: No Transportation Needs   • Lack of Transportation (Medical): No   • Lack of Transportation (Non-Medical): No   Physical Activity: Sufficiently Active   • Days of Exercise per Week: 4 days   • Minutes of Exercise per Session: 60 min   Stress: Not on file   Social Connections: Not on file   Intimate Partner Violence: Not on file   Housing Stability: Not on file      Medications and Allergies:     Current Outpatient Medications   Medication Sig Dispense Refill   • atorvastatin (LIPITOR) 10 mg tablet TAKE 1 TABLET BY MOUTH EVERY DAY AT NIGHT  1   • Calcium 200 MG TABS Take by mouth daily     • cycloSPORINE (RESTASIS) 0 05 % ophthalmic emulsion Administer 1 drop to both eyes every 12 (twelve) hours      • estradiol (Estring) 2 MG vaginal ring Insert 2 mg into the vagina every 3 (three) months follow package directions 1 each 3   • levothyroxine 100 mcg tablet Take 100 mcg by mouth daily  1   • multivitamin (THERAGRAN) TABS Take 1 tablet by mouth daily     • Omega-3 Fatty Acids (FISH OIL PO) Take by mouth     • Vitamin D, Cholecalciferol, 1000 units CAPS Take 5,000 Units by mouth daily       No current facility-administered medications for this visit       Allergies   Allergen Reactions   • Meloxicam Other (See Comments)     Gets very bad headache and upset stomach      Immunizations:     Immunization History   Administered Date(s) Administered   • COVID-19 PFIZER VACCINE 0 3 ML IM 02/22/2021, 03/16/2021, 09/28/2021   • COVID-19 Pfizer Vac BIVALENT James-sucrose 12 Yr+ IM (BOOSTER ONLY) 11/21/2022   • COVID-19 Pfizer vac (James-sucrose, gray cap) 12 yr+ IM 04/12/2022   • H1N1 Inj 12/29/2009   • H1N1, All Formulations 12/29/2009   • INFLUENZA 10/02/2009, 09/29/2010, 09/16/2015, 09/20/2017, 09/26/2018, 09/26/2018, 08/01/2020, 09/13/2020, 08/11/2021, 08/19/2021, 09/16/2022   • Influenza Injectable, MDCK, Preservative Free, Quadrivalent, 0 5 mL 09/27/2019   • Influenza Quadrivalent Preservative Free 3 years and older IM 09/16/2015   • Influenza Split 09/13/2013   • Influenza Split High Dose Preservative Free IM 08/01/2020   • Influenza, injectable, quadrivalent, preservative free 0 5 mL 09/26/2018   • Influenza, seasonal, injectable 10/02/2009, 09/29/2010   • Influenza, seasonal, injectable, preservative free 09/14/2011, 10/06/2014   • MMR 07/07/2020   • Pneumococcal Conjugate 13-Valent 09/13/2018   • Pneumococcal Polysaccharide PPV23 10/12/2020   • Tdap 01/25/2016   • Tuberculin Skin Test-PPD Intradermal 08/02/2006, 09/26/2011, 03/23/2022   • Zoster 08/04/2006, 03/27/2015, 04/08/2019   • Zoster Vaccine Recombinant 12/26/2018, 01/09/2023   • influenza, injectable, quadrivalent 09/26/2018      Health Maintenance:         Topic Date Due   • Hepatitis C Screening  Never done   • Colorectal Cancer Screening  Never done   • Breast Cancer Screening: Mammogram  09/06/2023         Topic Date Due   • COVID-19 Vaccine (5 - Booster for Shaikh Peter series) 06/07/2022      Medicare Screening Tests and Risk Assessments:     Ina Watts is here for her Subsequent Wellness visit  Health Risk Assessment:   Patient rates overall health as very good  Patient feels that their physical health rating is same  Patient is satisfied with their life  Eyesight was rated as same  Hearing was rated as same  Patient feels that their emotional and mental health rating is slightly worse  Patients states they are never, rarely angry  Patient states they are never, rarely unusually tired/fatigued  Pain experienced in the last 7 days has been none  Patient states that she has experienced no weight loss or gain in last 6 months  Depression Screening:   PHQ-2 Score: 0      Fall Risk Screening:    In the past year, patient has experienced: no history of falling in past year      Urinary Incontinence Screening: Patient has leaked urine accidently in the last six months  Home Safety:  Patient does not have trouble with stairs inside or outside of their home  Patient has working smoke alarms and has working carbon monoxide detector  Home safety hazards include: none  Nutrition:   Current diet is Regular  Medications:   Patient is currently taking over-the-counter supplements  OTC medications include: see medication list  Patient is able to manage medications  Activities of Daily Living (ADLs)/Instrumental Activities of Daily Living (IADLs):   Walk and transfer into and out of bed and chair?: Yes  Dress and groom yourself?: Yes    Bathe or shower yourself?: Yes    Feed yourself?  Yes  Do your laundry/housekeeping?: Yes  Manage your money, pay your bills and track your expenses?: Yes  Make your own meals?: Yes    Do your own shopping?: Yes    Previous Hospitalizations:   Any hospitalizations or ED visits within the last 12 months?: No      Advance Care Planning:   Living will: Yes    Advanced directive: Yes      Cognitive Screening:   Provider or family/friend/caregiver concerned regarding cognition?: No    PREVENTIVE SCREENINGS      Cardiovascular Screening:    General: Screening Not Indicated and History Lipid Disorder      Colorectal Cancer Screening:     General: Screening Current      Breast Cancer Screening:     General: Screening Current      Cervical Cancer Screening:    General: Screening Not Indicated      Osteoporosis Screening:    General: Screening Not Indicated and History Osteoporosis      Lung Cancer Screening:     General: Screening Not Indicated    Screening, Brief Intervention, and Referral to Treatment (SBIRT)    Screening      Single Item Drug Screening:  How often have you used an illegal drug (including marijuana) or a prescription medication for non-medical reasons in the past year? never    Single Item Drug Screen Score: 0  Interpretation: Negative screen for possible drug use disorder    Other Counseling Topics:   Car/seat belt/driving safety, skin self-exam, sunscreen and calcium and vitamin D intake and regular weightbearing exercise  No results found  Physical Exam:     /64 (BP Location: Left arm, Patient Position: Sitting, Cuff Size: Standard)   Pulse 66   Temp (!) 96 6 °F (35 9 °C) (Tympanic)   Resp 14   Ht 5' 5" (1 651 m)   Wt 65 9 kg (145 lb 3 2 oz)   SpO2 98%   BMI 24 16 kg/m²     Physical Exam  Constitutional:       General: She is not in acute distress  Appearance: She is well-developed  She is not diaphoretic  HENT:      Head: Normocephalic and atraumatic  Right Ear: External ear normal       Left Ear: External ear normal       Nose: Nose normal    Eyes:      General: No scleral icterus  Right eye: No discharge  Left eye: No discharge  Conjunctiva/sclera: Conjunctivae normal    Cardiovascular:      Rate and Rhythm: Normal rate and regular rhythm  Heart sounds: Normal heart sounds  No murmur heard  No friction rub  No gallop  Pulmonary:      Effort: Pulmonary effort is normal  No respiratory distress  Breath sounds: Normal breath sounds  No wheezing or rales  Abdominal:      General: Bowel sounds are normal  There is no distension  Palpations: Abdomen is soft  Tenderness: There is no abdominal tenderness  There is no guarding or rebound  Musculoskeletal:         General: No tenderness  Skin:     General: Skin is warm and dry  Findings: No erythema or rash  Neurological:      Mental Status: She is alert and oriented to person, place, and time  Cranial Nerves: No cranial nerve deficit  Sensory: No sensory deficit  Motor: No abnormal muscle tone     Psychiatric:         Behavior: Behavior normal           Madonna Yarbrough MD

## 2023-01-23 NOTE — PATIENT INSTRUCTIONS
Medicare Preventive Visit Patient Instructions  Thank you for completing your Welcome to Medicare Visit or Medicare Annual Wellness Visit today  Your next wellness visit will be due in one year (1/24/2024)  The screening/preventive services that you may require over the next 5-10 years are detailed below  Some tests may not apply to you based off risk factors and/or age  Screening tests ordered at today's visit but not completed yet may show as past due  Also, please note that scanned in results may not display below  Preventive Screenings:  Service Recommendations Previous Testing/Comments   Colorectal Cancer Screening  * Colonoscopy    * Fecal Occult Blood Test (FOBT)/Fecal Immunochemical Test (FIT)  * Fecal DNA/Cologuard Test  * Flexible Sigmoidoscopy Age: 39-70 years old   Colonoscopy: every 10 years (may be performed more frequently if at higher risk)  OR  FOBT/FIT: every 1 year  OR  Cologuard: every 3 years  OR  Sigmoidoscopy: every 5 years  Screening may be recommended earlier than age 39 if at higher risk for colorectal cancer  Also, an individualized decision between you and your healthcare provider will decide whether screening between the ages of 74-80 would be appropriate  Colonoscopy: 09/06/2022  FOBT/FIT: Not on file  Cologuard: Not on file  Sigmoidoscopy: Not on file    Screening Current     Breast Cancer Screening Age: 36 years old  Frequency: every 1-2 years  Not required if history of left and right mastectomy Mammogram: 09/06/2022    Screening Current   Cervical Cancer Screening Between the ages of 21-29, pap smear recommended once every 3 years  Between the ages of 33-67, can perform pap smear with HPV co-testing every 5 years     Recommendations may differ for women with a history of total hysterectomy, cervical cancer, or abnormal pap smears in past  Pap Smear: 12/05/2022    Screening Not Indicated   Hepatitis C Screening Once for adults born between 1945 and 1965  More frequently in patients at high risk for Hepatitis C Hep C Antibody: Not on file        Diabetes Screening 1-2 times per year if you're at risk for diabetes or have pre-diabetes Fasting glucose: 86 mg/dL (5/24/2021)  A1C: 5 4 % (5/24/2021)      Cholesterol Screening Once every 5 years if you don't have a lipid disorder  May order more often based on risk factors  Lipid panel: 11/03/2022    Screening Not Indicated  History Lipid Disorder     Other Preventive Screenings Covered by Medicare:  1  Abdominal Aortic Aneurysm (AAA) Screening: covered once if your at risk  You're considered to be at risk if you have a family history of AAA  2  Lung Cancer Screening: covers low dose CT scan once per year if you meet all of the following conditions: (1) Age 50-69; (2) No signs or symptoms of lung cancer; (3) Current smoker or have quit smoking within the last 15 years; (4) You have a tobacco smoking history of at least 20 pack years (packs per day multiplied by number of years you smoked); (5) You get a written order from a healthcare provider  3  Glaucoma Screening: covered annually if you're considered high risk: (1) You have diabetes OR (2) Family history of glaucoma OR (3)  aged 48 and older OR (3)  American aged 72 and older  3  Osteoporosis Screening: covered every 2 years if you meet one of the following conditions: (1) You're estrogen deficient and at risk for osteoporosis based off medical history and other findings; (2) Have a vertebral abnormality; (3) On glucocorticoid therapy for more than 3 months; (4) Have primary hyperparathyroidism; (5) On osteoporosis medications and need to assess response to drug therapy  · Last bone density test (DXA Scan): 03/01/2022   5  HIV Screening: covered annually if you're between the age of 15-65  Also covered annually if you are younger than 13 and older than 72 with risk factors for HIV infection   For pregnant patients, it is covered up to 3 times per pregnancy  Immunizations:  Immunization Recommendations   Influenza Vaccine Annual influenza vaccination during flu season is recommended for all persons aged >= 6 months who do not have contraindications   Pneumococcal Vaccine   * Pneumococcal conjugate vaccine = PCV13 (Prevnar 13), PCV15 (Vaxneuvance), PCV20 (Prevnar 20)  * Pneumococcal polysaccharide vaccine = PPSV23 (Pneumovax) Adults 25-60 years old: 1-3 doses may be recommended based on certain risk factors  Adults 72 years old: 1-2 doses may be recommended based off what pneumonia vaccine you previously received   Hepatitis B Vaccine 3 dose series if at intermediate or high risk (ex: diabetes, end stage renal disease, liver disease)   Tetanus (Td) Vaccine - COST NOT COVERED BY MEDICARE PART B Following completion of primary series, a booster dose should be given every 10 years to maintain immunity against tetanus  Td may also be given as tetanus wound prophylaxis  Tdap Vaccine - COST NOT COVERED BY MEDICARE PART B Recommended at least once for all adults  For pregnant patients, recommended with each pregnancy  Shingles Vaccine (Shingrix) - COST NOT COVERED BY MEDICARE PART B  2 shot series recommended in those aged 48 and above     Health Maintenance Due:      Topic Date Due   • Hepatitis C Screening  Never done   • Colorectal Cancer Screening  Never done   • Breast Cancer Screening: Mammogram  09/06/2023     Immunizations Due:      Topic Date Due   • COVID-19 Vaccine (5 - Booster for Shaikh Peter series) 06/07/2022     Advance Directives   What are advance directives? Advance directives are legal documents that state your wishes and plans for medical care  These plans are made ahead of time in case you lose your ability to make decisions for yourself  Advance directives can apply to any medical decision, such as the treatments you want, and if you want to donate organs  What are the types of advance directives?   There are many types of advance directives, and each state has rules about how to use them  You may choose a combination of any of the following:  · Living will: This is a written record of the treatment you want  You can also choose which treatments you do not want, which to limit, and which to stop at a certain time  This includes surgery, medicine, IV fluid, and tube feedings  · Durable power of  for healthcare [facility]): This is a written record that states who you want to make healthcare choices for you when you are unable to make them for yourself  This person, called a proxy, is usually a family member or a friend  You may choose more than 1 proxy  · Do not resuscitate (DNR) order:  A DNR order is used in case your heart stops beating or you stop breathing  It is a request not to have certain forms of treatment, such as CPR  A DNR order may be included in other types of advance directives  · Medical directive: This covers the care that you want if you are in a coma, near death, or unable to make decisions for yourself  You can list the treatments you want for each condition  Treatment may include pain medicine, surgery, blood transfusions, dialysis, IV or tube feedings, and a ventilator (breathing machine)  · Values history: This document has questions about your views, beliefs, and how you feel and think about life  This information can help others choose the care that you would choose  Why are advance directives important? An advance directive helps you control your care  Although spoken wishes may be used, it is better to have your wishes written down  Spoken wishes can be misunderstood, or not followed  Treatments may be given even if you do not want them  An advance directive may make it easier for your family to make difficult choices about your care  Urinary Incontinence   Urinary incontinence (UI)  is when you lose control of your bladder  UI develops because your bladder cannot store or empty urine properly   The 3 most common types of UI are stress incontinence, urge incontinence, or both  Medicines:   · May be given to help strengthen your bladder control  Report any side effects of medication to your healthcare provider  Do pelvic muscle exercises often:  Your pelvic muscles help you stop urinating  Squeeze these muscles tight for 5 seconds, then relax for 5 seconds  Gradually work up to squeezing for 10 seconds  Do 3 sets of 15 repetitions a day, or as directed  This will help strengthen your pelvic muscles and improve bladder control  Train your bladder:  Go to the bathroom at set times, such as every 2 hours, even if you do not feel the urge to go  You can also try to hold your urine when you feel the urge to go  For example, hold your urine for 5 minutes when you feel the urge to go  As that becomes easier, hold your urine for 10 minutes  Self-care:   · Keep a UI record  Write down how often you leak urine and how much you leak  Make a note of what you were doing when you leaked urine  · Drink liquids as directed  You may need to limit the amount of liquid you drink to help control your urine leakage  Do not drink any liquid right before you go to bed  Limit or do not have drinks that contain caffeine or alcohol  · Prevent constipation  Eat a variety of high-fiber foods  Good examples are high-fiber cereals, beans, vegetables, and whole-grain breads  Walking is the best way to trigger your intestines to have a bowel movement  · Exercise regularly and maintain a healthy weight  Weight loss and exercise will decrease pressure on your bladder and help you control your leakage  · Use a catheter as directed  to help empty your bladder  A catheter is a tiny, plastic tube that is put into your bladder to drain your urine  · Go to behavior therapy as directed  Behavior therapy may be used to help you learn to control your urge to urinate         © Copyright [employer] 2018 Information is for End User's use only and may not be sold, redistributed or otherwise used for commercial purposes   All illustrations and images included in CareNotes® are the copyrighted property of A D A M , Inc  or [facility]

## 2023-05-18 ENCOUNTER — OFFICE VISIT (OUTPATIENT)
Dept: GASTROENTEROLOGY | Facility: CLINIC | Age: 68
End: 2023-05-18

## 2023-05-18 ENCOUNTER — TRANSCRIBE ORDERS (OUTPATIENT)
Dept: GASTROENTEROLOGY | Facility: CLINIC | Age: 68
End: 2023-05-18

## 2023-05-18 VITALS
WEIGHT: 145 LBS | HEIGHT: 65 IN | TEMPERATURE: 97.1 F | DIASTOLIC BLOOD PRESSURE: 70 MMHG | SYSTOLIC BLOOD PRESSURE: 128 MMHG | BODY MASS INDEX: 24.16 KG/M2

## 2023-05-18 DIAGNOSIS — K58.0 IRRITABLE BOWEL SYNDROME WITH DIARRHEA: Primary | ICD-10-CM

## 2023-05-18 DIAGNOSIS — R15.9 INCONTINENCE OF FECES, UNSPECIFIED FECAL INCONTINENCE TYPE: ICD-10-CM

## 2023-05-18 NOTE — PROGRESS NOTES
[facility] - Outpatient Consultation  Damian Rico 76 y o  female MRN: [id number]  Encounter: [id number]          ASSESSMENT AND PLAN:      1  Irritable bowel syndrome with diarrhea  2  Incontinence of feces, unspecified fecal incontinence type  She has irritable bowel syndrome with alternating diarrhea and constipation as well as fecal incontinence and the fecal incontinence is likely due to weakened anal sphincter tone due to complications from vaginal delivery approximately 23 years ago  Unfortunately the Silastic injections did not help  I explained it is often challenging to manage irritable bowel syndrome with alternating diarrhea and constipation when it coexist with a weakened sphincter because the moment the stools become soft or liquid the incontinence tends to worsen  I encouraged her to eat a high-fiber diet and will refer her to colorectal surgery to evaluate for potential management options for her fecal incontinence  In the meantime I also encouraged her to add a fiber supplement and increase her exercise and drink more fluids  ______________________________________________________________________    HPI: She presents for evaluation because of irritable bowel syndrome with alternating constipation and diarrhea and fecal incontinence  For many years she has been struggling with changes in the consistency of her stools and leakage of liquid stool and sometimes even solid small balls of stool  She is currently eating a high-fiber diet but still has this change in the consistency of her stools  She had Celesta injections into her anal sphincter years ago by Dr Kris Bautista but this did not help her symptoms and she ended up requiring surgery to open an abscess that formed as a result of the procedure  Her last Cologuard test was negative in 2022 and she believes her last colonoscopy was negative in 2019    She believes she was asked to repeat the colonoscopy in approximately 10 years  She has not noticed any recent bleeding and denies abdominal pain, nausea, vomiting, reflux, and difficulty swallowing  Her weight has remained stable  She denies any family history of colon cancer or colon polyps  REVIEW OF SYSTEMS:    CONSTITUTIONAL: Denies any fever, chills, rigors, and weight loss  HEENT: No earache or tinnitus  Denies hearing loss or visual disturbances  CARDIOVASCULAR: No chest pain or palpitations  RESPIRATORY: Denies any cough, hemoptysis, shortness of breath or dyspnea on exertion  GASTROINTESTINAL: As noted in the History of Present Illness  GENITOURINARY: No problems with urination  Denies any hematuria or dysuria  NEUROLOGIC: No dizziness or vertigo, denies headaches  MUSCULOSKELETAL: Denies any muscle or joint pain  SKIN: Denies skin rashes or itching  ENDOCRINE: Denies excessive thirst  Denies intolerance to heat or cold  PSYCHOSOCIAL: Denies depression or anxiety  Denies any recent memory loss         Historical Information   Past Medical History:   Diagnosis Date   • Age-related osteoporosis without current pathological fracture  9/9/2021   • Chronic low back pain 2/6/2020   • Disease of thyroid gland    • Ectopic pregnancy 1984   • Encounter for annual routine gynecological examination 5/30/2019   • Encounter for postoperative care related to surgical joint fusion 10/28/2021   • Encounter for screening mammogram for malignant neoplasm of breast 9/9/2021   • High cholesterol    • Hypothyroidism    • Mid back pain 9/14/2021   • Migraine     During perimenopause, not a problem now   • Miscarriage     ectopic 1984, miscarriage 1992   • Osteopenia    • Peroneal tendonitis    • Vaginal atrophy 5/30/2019   • Varicella     Date unknown     Past Surgical History:   Procedure Laterality Date   • COLON MANOMETRY     • COLONOSCOPY     • DENTAL SURGERY      removal of broken tooth and implant to be done   • DILATION AND CURETTAGE OF UTERUS     • "ECTOPIC PREGNANCY SURGERY     • NASAL SEPTUM SURGERY     • [place] ARTHRODESIS COMBINED TQ 1NTRSPC LUMBAR N/A 6/7/2021    Procedure: Right L4-5 transverse lumbar interbody fusion and decompression;  Surgeon: Jennifer Munoz MD;  Location: [facility];  Service: Neurosurgery   • REFRACTIVE SURGERY     • VAGINAL DELIVERY       Social History   Social History     Substance and Sexual Activity   Alcohol Use Not Currently    Comment: Less than one per month     Social History     Substance and Sexual Activity   Drug Use Never     Social History     Tobacco Use   Smoking Status Never   Smokeless Tobacco Never     Family History   Problem Relation Age of Onset   • Lymphoma Mother    • Cancer Mother         Lymphoma   • Hypertension Mother    • Migraines Mother    • Ankylosing spondylitis Father    • Arthritis Father    • Lung cancer Father    • No Known Problems Sister    • Diabetes Paternal Grandmother    • Heart disease Paternal Grandfather    • Hypertension Paternal Grandfather    • Breast cancer Paternal Aunt    • Crohn's disease Cousin        Meds/Allergies       Current Outpatient Medications:   •  atorvastatin (LIPITOR) 10 mg tablet  •  Calcium 200 MG TABS  •  cycloSPORINE (RESTASIS) 0 05 % ophthalmic emulsion  •  estradiol (Estring) 2 MG vaginal ring  •  levothyroxine 100 mcg tablet  •  multivitamin (THERAGRAN) TABS  •  Omega-3 Fatty Acids (FISH OIL PO)  •  Vitamin D, Cholecalciferol, 1000 units CAPS    Allergies   Allergen Reactions   • Meloxicam Other (See Comments)     Gets very bad headache and upset stomach           Objective     Blood pressure 128/70, temperature (!) 97 1 °F (36 2 °C), temperature source Tympanic, height 5' 5\" (1 651 m), weight 65 8 kg (145 lb)  Body mass index is 24 13 kg/m²          PHYSICAL EXAM:      General Appearance:   Alert, cooperative, no distress   HEENT:   Normocephalic, atraumatic, anicteric      Neck:  Supple, symmetrical, trachea midline   Lungs:   Clear to auscultation bilaterally; " no rales, rhonchi or wheezing; respirations unlabored    Heart[de-identified]   Regular rate and rhythm; no murmur, rub, or gallop  Abdomen:   Soft, non-tender, non-distended; normal bowel sounds; no masses, no organomegaly    Genitalia:   Deferred    Rectal:   Anal sphincter tone slightly decreased    Extremities:  No cyanosis, clubbing or edema    Pulses:  2+ and symmetric    Skin:  No jaundice, rashes, or lesions    Lymph nodes:  No palpable cervical lymphadenopathy        Lab Results:   No visits with results within 1 Day(s) from this visit  Latest known visit with results is:   Transcribe Orders on 11/03/2022   Component Date Value   • Cholesterol 11/03/2022 203 (H)    • Triglycerides 11/03/2022 64    • HDL, Direct 11/03/2022 84    • LDL Calculated 11/03/2022 106 (H)    • Non-HDL-Chol (CHOL-HDL) 11/03/2022 119    • TSH 3RD GENERATON 11/03/2022 1 750          Radiology Results:   No results found  Answers for HPI/ROS submitted by the patient on 5/17/2023  Chronicity: recurrent  Onset: more than 1 year ago  Onset quality: undetermined  Frequency: rarely  Episode duration: 8 Hours  Progression since onset: waxing and waning  Pain location: LLQ  Pain - numeric: 2/10  Pain quality: dull  Radiates to: perineum  anorexia: No  arthralgias: No  belching: No  constipation: Yes  diarrhea: Yes  dysuria: No  fever: No  flatus: No  frequency: Yes  headaches: No  hematochezia: No  hematuria: No  melena: No  myalgias: No  nausea:  No  weight loss: No  vomiting: No  Aggravated by: eating  Relieved by: nothing

## 2023-06-23 NOTE — PROGRESS NOTES
Colon and Rectal Surgery   Eliot Ray 76 y o  female MRN: [id number]   Encounter: [id number]  06/27/23   2:22 PM        ASSESSMENT:    Niesha Ewing is a 71-year-old female, she has seen our practice for similar complaints of fecal incontinence back in 2011 by remote record review  Manometry at that time showed some paradoxical motion, decreased squeeze duration  Despite her continuing with Kegel exercises she is having ongoing troubles with loss of pellet-like bowel movements, and if she is having more pasty stools loss of liquid, nearly daily  She has tried fiber tablets to no avail  We discussed fecal incontinence, prior vaginal deliveries, her first child with vacuum forceps and major birth trauma/suture repair by her recollection, this is apparent on digital rectal examination with quite thin anterior attenuation and decreased perineal body, discussed dietary/lifestyle changes and completion of work-up, including pelvic floor physical therapy prior to consideration of sacral nerve stimulation  PLAN:  Colonoscopy w/random biopsies scheduled  Pelvic Floor PT referral placed  High fiber diet/increased hydration, 20-30grams fiber per day, increased fruits/vegetables/psyllium(metamucil or konsyl 1 tbsp 1-2x/day)  She will keep a bowel diary in the interval, and if she continues with accidents after the above work-up we will schedule stage I InterStim as discussed  CC:  Dr Elena Colón, PT, MSPT    HPI  Eliot Ray is a 76 y o  female referred for evaluation today by Dr Cristi Doll for fecal incontinence  His most recent colonoscopy on 6/19/2019 with Dr Karel Forbes showed decreased sphincter tone with a 10 year colonoscopy recall       Historical Information   Past Medical History:   Diagnosis Date   • Age-related osteoporosis without current pathological fracture  9/9/2021   • Chronic low back pain 2/6/2020   • Disease of thyroid gland    • Ectopic pregnancy 1984   • Encounter for annual routine gynecological examination 5/30/2019   • Encounter for postoperative care related to surgical joint fusion 10/28/2021   • Encounter for screening mammogram for malignant neoplasm of breast 9/9/2021   • High cholesterol    • Hypothyroidism    • Mid back pain 9/14/2021   • Migraine     During perimenopause, not a problem now   • Miscarriage     ectopic 1984, miscarriage 1992   • Osteopenia    • Peroneal tendonitis    • Vaginal atrophy 5/30/2019   • Varicella     Date unknown     Past Surgical History:   Procedure Laterality Date   • COLON MANOMETRY     • COLONOSCOPY     • DENTAL SURGERY      removal of broken tooth and implant to be done   • DILATION AND CURETTAGE OF UTERUS     • ECTOPIC PREGNANCY SURGERY     • NASAL SEPTUM SURGERY     • [place] ARTHRODESIS COMBINED TQ 1NTRSPC LUMBAR N/A 6/7/2021    Procedure: Right L4-5 transverse lumbar interbody fusion and decompression;  Surgeon: Alessandra Roa MD;  Location[facility];  Service: Neurosurgery   • REFRACTIVE SURGERY     • VAGINAL DELIVERY         Meds/Allergies       Current Outpatient Medications:   •  atorvastatin (LIPITOR) 10 mg tablet, TAKE 1 TABLET BY MOUTH EVERY DAY AT NIGHT, Disp: , Rfl: 1  •  Calcium 200 MG TABS, Take by mouth daily, Disp: , Rfl:   •  cycloSPORINE (RESTASIS) 0 05 % ophthalmic emulsion, Administer 1 drop to both eyes every 12 (twelve) hours , Disp: , Rfl:   •  estradiol (Estring) 2 MG vaginal ring, Insert 2 mg into the vagina every 3 (three) months follow package directions, Disp: 1 each, Rfl: 3  •  levothyroxine 100 mcg tablet, Take 100 mcg by mouth daily, Disp: , Rfl: 1  •  multivitamin (THERAGRAN) TABS, Take 1 tablet by mouth daily, Disp: , Rfl:   •  Omega-3 Fatty Acids (FISH OIL PO), Take by mouth, Disp: , Rfl:   •  Vitamin D, Cholecalciferol, 1000 units CAPS, Take 5,000 Units by mouth daily, Disp: , Rfl:       Allergies   Allergen Reactions   • Meloxicam Other (See Comments)     Gets very bad headache and upset stomach "        Social History   Social History     Substance and Sexual Activity   Alcohol Use Not Currently    Comment: Less than one per month     Social History     Substance and Sexual Activity   Drug Use Never     Social History     Tobacco Use   Smoking Status Never   Smokeless Tobacco Never     Family History:   Family History   Problem Relation Age of Onset   • Lymphoma Mother    • Cancer Mother         Lymphoma   • Hypertension Mother    • Migraines Mother    • Ankylosing spondylitis Father    • Arthritis Father    • Lung cancer Father    • No Known Problems Sister    • Diabetes Paternal Grandmother    • Heart disease Paternal Grandfather    • Hypertension Paternal Grandfather    • Breast cancer Paternal Aunt    • Crohn's disease Cousin        Review of Systems   Constitutional: Negative  HENT: Negative  Eyes: Negative  Respiratory: Negative  Cardiovascular: Negative  Gastrointestinal: Negative  Fecal incontinence   Endocrine: Negative  Genitourinary: Negative  Musculoskeletal: Negative  Skin: Negative  Allergic/Immunologic: Negative  Neurological: Negative  Hematological: Negative  Psychiatric/Behavioral: Negative          Objective   Current Vitals:   Vitals:    06/27/23 1354   Weight: 65 7 kg (144 lb 12 8 oz)   Height: 5' 5\" (1 651 m)     Physical Exam:  General:no distress  Eyes:perrla/eomi  ENT:moist mucus membranes  Neck:supple  Pulm:no increased work of breathing, clear bilateral  CV:sinus  Abdomen:soft,nontender  Rectal:normal perianal skin, fair sphincter tone with poor to fair squeeze fair push,anterior attenuation and loss of perineal body  Extremities:no edema        "

## 2023-06-27 ENCOUNTER — TELEPHONE (OUTPATIENT)
Age: 68
End: 2023-06-27

## 2023-06-27 ENCOUNTER — OFFICE VISIT (OUTPATIENT)
Age: 68
End: 2023-06-27
Payer: MEDICARE

## 2023-06-27 VITALS — BODY MASS INDEX: 24.12 KG/M2 | WEIGHT: 144.8 LBS | HEIGHT: 65 IN

## 2023-06-27 DIAGNOSIS — R15.9 INCONTINENCE OF FECES, UNSPECIFIED FECAL INCONTINENCE TYPE: ICD-10-CM

## 2023-06-27 DIAGNOSIS — R15.9 INCONTINENCE OF FECES, UNSPECIFIED FECAL INCONTINENCE TYPE: Primary | ICD-10-CM

## 2023-06-27 PROCEDURE — 99204 OFFICE O/P NEW MOD 45 MIN: CPT | Performed by: COLON & RECTAL SURGERY

## 2023-06-27 NOTE — LETTER
[place] [address] [address]      FLEXIBLE COLONOSCOPY INSTRUCTIONS  PLEASE NOTE    AS OF JUNE 1, 2014, OUR OFFICE REQUIRES 72 HOURS NOTICE OF CANCELLATION/RESCHEDULE OF A PROCEDURE TO AVOID INCURRING A MISSED APPOINTMENT FEE  Your Colonoscopy Procedure has been scheduled at:  [id number], [place], [place] [phone]    The Date of your Procedure is: August 21, 2023    Dr Sarah Armstrong  will be performing the procedure  Report to the [facility] 45 minutes prior to the procedure  The total time at the facility will be approximately 1 1/2 hours  Please bring to your procedure at [facility]:   1  Insurance Cards and referrals if required by [employer] company   2  Valid Photo ID   3  Power of  Form if required    Use the bowel preparation as directed  Check with your family doctor if you are taking a blood thinner (Coumadin, Plavix, Xarelto, Pradaxa, Gingko biloba, Ginseng, Feverfew, St  Kalen's Wort)  We suggest stopping these for 3 days  Special instructions may be needed if you are taking aspirin or any aspirin-containing medication  Check with your family physician  If you are on DIABETIC MEDICATION (tablets or insulin) your doctor may make changes in your preparation  Take all medications usual unless otherwise instructed  Feel free to call the physician's office to answer any questions or address any concerns you have regarding your procedure or preparation  A nurse will be happy to assist you  Because anesthesia is given to you during the procedure, the center requires that you be discharged under supervision of an adult to take you home after the procedure is performed  They will also need to sign as a witness on your discharge instructions  Public Transportation such as VAST, BUS, TAXI is Not Acceptable      It is important you notify our office of any insurance changes prior to your procedure and, if necessary, supply us with referrals from your primary care physician  COLONOSCOPY PREPARATION INSTRUCTIONS    Purchase (prescription not required):  · 238 gram bottle of Miralax® (Glycolax®)  · 4 Dulcolax® (Bisacodyl) Laxative Tablets  · 64 oz  bottle of Gatorade® or your preference of a non-carbonated clear liquid - NOT RED OR PURPLE     One Day Prior to Colonoscopy Procedure  · Nothing to eat the day before your procedure, only clear liquids  · It is important that you drink plenty of clear liquids throughout the day to prevent dehydration  Clear Liquids include:  o Water/Iced Tea/Lemonade/Gatorade®/Black Coffee or tea (no milk or creamers  o Soft drinks: orange, ginger ale, cola, Pepsi®, Sprite®, 7Up®  o Derek-Aid® (lemonade or orange flavors only)  o Strained fruit juices without pulp such as apple, white grape, white cranberry  o Jell-O®, lemon, lime or orange (no fruit or toppings)  o Popsicles, [place] Ice (No Ice Cream, sherbets, or fruit bars)  o Chicken or beef bouillon/broth  DO NOT EAT OR DRINK ANYTHING RED OR PURPLE  DO NOT DRINK ANY ALCOHOLIC BEVERAGES  DIABETIC PATIENTS: Consult your physician    At 4:00 pm, take (2) Dulcolax® (Bisacodyl) Laxative Tablets  Swallow the tablets whole with an 8 oz  glass of water  At 8:00 pm, take the additional (2) Dulcolax® (Bisacodyl) Laxative Tablets with 8 oz  of water  The package may direct you not to exceed (2) tablets at any time but for the purpose of this examination, you should take (4) total     Mix the 238 gm of Miralax® in 64 oz  of Gatorade® and shake the solution until the Miralax® is dissolved  You will drink half (32 oz) of this solution this evening, beginning between 4 and 6 o'clock  Drink 8 oz glassfuls at your own pace  It may take several hours to drink the solution  Remember to stay close to toilet facilities      DAY OF COLONOSCOPY PROCEDURE    Five (5) hours before your procedure, drink the other half (32 oz) of the Miralax®/Gatorade® mixture within a two (2) hour period  This may require you to get up very early if you are scheduled for an early procedure  NOTHING IS TO BE TAKEN BY MOUTH 3 HOURS PRIOR TO PROCEDURE  If you use an inhaler, please bring it with you to your procedure

## 2023-06-27 NOTE — PATIENT INSTRUCTIONS
ASSESSMENT:    Musa Navarro is a 72-year-old female, she has seen our practice for similar complaints of fecal incontinence back in 2011 by remote record review  Manometry at that time showed some paradoxical motion, decreased squeeze duration  Despite her continuing with Kegel exercises she is having ongoing troubles with loss of pellet-like bowel movements, and if she is having more pasty stools loss of liquid, nearly daily  She has tried fiber tablets to no avail  We discussed fecal incontinence, prior vaginal deliveries, her first child with vacuum forceps and major birth trauma/suture repair by her recollection, this is apparent on digital rectal examination with quite thin anterior attenuation and decreased perineal body, discussed dietary/lifestyle changes and completion of work-up, including pelvic floor physical therapy prior to consideration of sacral nerve stimulation      PLAN:  Colonoscopy w/random biopsies scheduled  Pelvic Floor PT referral placed  High fiber diet/increased hydration, 20-30grams fiber per day, increased fruits/vegetables/psyllium(metamucil or konsyl 1 tbsp 1-2x/day)  She will keep a bowel diary in the interval, and if she continues with accidents after the above work-up we will schedule stage I InterStim as discussed  CC:  Dr Josefina Chavez, PT, MSPT

## 2023-06-27 NOTE — TELEPHONE ENCOUNTER
OV 6/27/2023; fecal incontinence  BMI 24  Scheduled for 8/21/2023, [facility],  [address], [place]  Handed infomation to pt

## 2023-08-11 ENCOUNTER — APPOINTMENT (OUTPATIENT)
Age: 68
End: 2023-08-11
Payer: MEDICARE

## 2023-08-11 DIAGNOSIS — Z11.59 NEED FOR HEPATITIS C SCREENING TEST: ICD-10-CM

## 2023-08-11 DIAGNOSIS — E55.9 VITAMIN D DEFICIENCY: ICD-10-CM

## 2023-08-11 DIAGNOSIS — E78.2 MIXED HYPERLIPIDEMIA: ICD-10-CM

## 2023-08-11 LAB
25(OH)D3 SERPL-MCNC: 42 NG/ML (ref 30–100)
ALBUMIN SERPL BCP-MCNC: 3.5 G/DL (ref 3.5–5)
ALP SERPL-CCNC: 99 U/L (ref 46–116)
ALT SERPL W P-5'-P-CCNC: 33 U/L (ref 12–78)
ANION GAP SERPL CALCULATED.3IONS-SCNC: 2 MMOL/L
AST SERPL W P-5'-P-CCNC: 23 U/L (ref 5–45)
BASOPHILS # BLD AUTO: 0.05 THOUSANDS/ÂΜL (ref 0–0.1)
BASOPHILS NFR BLD AUTO: 1 % (ref 0–1)
BILIRUB SERPL-MCNC: 0.4 MG/DL (ref 0.2–1)
BUN SERPL-MCNC: 22 MG/DL (ref 5–25)
CALCIUM SERPL-MCNC: 9.6 MG/DL (ref 8.3–10.1)
CHLORIDE SERPL-SCNC: 109 MMOL/L (ref 96–108)
CHOLEST SERPL-MCNC: 173 MG/DL
CO2 SERPL-SCNC: 29 MMOL/L (ref 21–32)
CREAT SERPL-MCNC: 0.87 MG/DL (ref 0.6–1.3)
EOSINOPHIL # BLD AUTO: 0.12 THOUSAND/ÂΜL (ref 0–0.61)
EOSINOPHIL NFR BLD AUTO: 2 % (ref 0–6)
ERYTHROCYTE [DISTWIDTH] IN BLOOD BY AUTOMATED COUNT: 13 % (ref 11.6–15.1)
GFR SERPL CREATININE-BSD FRML MDRD: 68 ML/MIN/1.73SQ M
GLUCOSE P FAST SERPL-MCNC: 98 MG/DL (ref 65–99)
HCT VFR BLD AUTO: 40.3 % (ref 34.8–46.1)
HCV AB SER QL: NORMAL
HDLC SERPL-MCNC: 70 MG/DL
HGB BLD-MCNC: 13.5 G/DL (ref 11.5–15.4)
IMM GRANULOCYTES # BLD AUTO: 0.02 THOUSAND/UL (ref 0–0.2)
IMM GRANULOCYTES NFR BLD AUTO: 0 % (ref 0–2)
LDLC SERPL CALC-MCNC: 95 MG/DL (ref 0–100)
LYMPHOCYTES # BLD AUTO: 1.86 THOUSANDS/ÂΜL (ref 0.6–4.47)
LYMPHOCYTES NFR BLD AUTO: 26 % (ref 14–44)
MCH RBC QN AUTO: 30.8 PG (ref 26.8–34.3)
MCHC RBC AUTO-ENTMCNC: 33.5 G/DL (ref 31.4–37.4)
MCV RBC AUTO: 92 FL (ref 82–98)
MONOCYTES # BLD AUTO: 0.62 THOUSAND/ÂΜL (ref 0.17–1.22)
MONOCYTES NFR BLD AUTO: 9 % (ref 4–12)
NEUTROPHILS # BLD AUTO: 4.37 THOUSANDS/ÂΜL (ref 1.85–7.62)
NEUTS SEG NFR BLD AUTO: 62 % (ref 43–75)
NONHDLC SERPL-MCNC: 103 MG/DL
NRBC BLD AUTO-RTO: 0 /100 WBCS
PLATELET # BLD AUTO: 326 THOUSANDS/UL (ref 149–390)
PMV BLD AUTO: 10.6 FL (ref 8.9–12.7)
POTASSIUM SERPL-SCNC: 4.4 MMOL/L (ref 3.5–5.3)
PROT SERPL-MCNC: 7 G/DL (ref 6.4–8.4)
RBC # BLD AUTO: 4.38 MILLION/UL (ref 3.81–5.12)
SODIUM SERPL-SCNC: 140 MMOL/L (ref 135–147)
TRIGL SERPL-MCNC: 38 MG/DL
TSH SERPL DL<=0.05 MIU/L-ACNC: 1.31 UIU/ML (ref 0.45–4.5)
WBC # BLD AUTO: 7.04 THOUSAND/UL (ref 4.31–10.16)

## 2023-08-11 PROCEDURE — 84443 ASSAY THYROID STIM HORMONE: CPT

## 2023-08-11 PROCEDURE — 86803 HEPATITIS C AB TEST: CPT

## 2023-08-11 PROCEDURE — 82306 VITAMIN D 25 HYDROXY: CPT

## 2023-08-11 PROCEDURE — 80053 COMPREHEN METABOLIC PANEL: CPT

## 2023-08-11 PROCEDURE — 80061 LIPID PANEL: CPT

## 2023-08-11 PROCEDURE — 85025 COMPLETE CBC W/AUTO DIFF WBC: CPT

## 2023-08-11 PROCEDURE — 36415 COLL VENOUS BLD VENIPUNCTURE: CPT

## 2023-08-14 ENCOUNTER — OFFICE VISIT (OUTPATIENT)
Age: 68
End: 2023-08-14
Payer: MEDICARE

## 2023-08-14 VITALS
RESPIRATION RATE: 17 BRPM | WEIGHT: 146.6 LBS | BODY MASS INDEX: 24.43 KG/M2 | HEART RATE: 55 BPM | HEIGHT: 65 IN | DIASTOLIC BLOOD PRESSURE: 68 MMHG | SYSTOLIC BLOOD PRESSURE: 104 MMHG | TEMPERATURE: 97.3 F | OXYGEN SATURATION: 94 %

## 2023-08-14 DIAGNOSIS — R15.9 INCONTINENCE OF FECES, UNSPECIFIED FECAL INCONTINENCE TYPE: ICD-10-CM

## 2023-08-14 DIAGNOSIS — E78.2 MIXED HYPERLIPIDEMIA: ICD-10-CM

## 2023-08-14 DIAGNOSIS — F33.8 SEASONAL DEPRESSION (HCC): ICD-10-CM

## 2023-08-14 DIAGNOSIS — E03.9 ACQUIRED HYPOTHYROIDISM: Primary | ICD-10-CM

## 2023-08-14 DIAGNOSIS — K58.0 IRRITABLE BOWEL SYNDROME WITH DIARRHEA: ICD-10-CM

## 2023-08-14 DIAGNOSIS — N39.41 URGE INCONTINENCE OF URINE: ICD-10-CM

## 2023-08-14 DIAGNOSIS — E55.9 VITAMIN D DEFICIENCY: ICD-10-CM

## 2023-08-14 DIAGNOSIS — M85.89 OSTEOPENIA OF MULTIPLE SITES: ICD-10-CM

## 2023-08-14 PROCEDURE — 99214 OFFICE O/P EST MOD 30 MIN: CPT

## 2023-08-14 RX ORDER — ATORVASTATIN CALCIUM 10 MG/1
10 TABLET, FILM COATED ORAL DAILY
Qty: 90 TABLET | Refills: 3 | Status: SHIPPED | OUTPATIENT
Start: 2023-08-14

## 2023-08-14 RX ORDER — LEVOTHYROXINE SODIUM 0.1 MG/1
100 TABLET ORAL DAILY
Qty: 90 TABLET | Refills: 3 | Status: SHIPPED | OUTPATIENT
Start: 2023-08-14

## 2023-08-14 NOTE — PROGRESS NOTES
Name: Gerard Castelan      : 1955      MRN: [id number]  Encounter Provider: ROME Remy  Encounter Date: 2023   Encounter department: [address]         1. Acquired hypothyroidism  TSH normal, continue levothyroxine same dose. Repeat TSH 6 months.  - CBC and differential; Future  - Comprehensive metabolic panel; Future  - TSH, 3rd generation with Free T4 reflex; Future  - levothyroxine 100 mcg tablet; Take 1 tablet (100 mcg total) by mouth daily  Dispense: 90 tablet; Refill: 3    2. Irritable bowel syndrome with diarrhea  Denies diarrhea or constipation, she follows with GI -scheduled for colonoscopy this month. 3. Osteopenia of multiple sites  Continue vitamin D and calcium supplements with regular weight bearing exercise. 4. Vitamin D deficiency  Off vit D  for the summer, however Vitamin D level remains normal.   Continue vitamin D over-the-counter. 5. Mixed hyperlipidemia  Cholesterol improved overall, continue atorvastatin and lifestyle modification.  - Lipid Panel with Direct LDL reflex; Future  - atorvastatin (LIPITOR) 10 mg tablet; Take 1 tablet (10 mg total) by mouth daily  Dispense: 90 tablet; Refill: 3    6. Seasonal depression ([address])  Active during the winter- Vit D helps. 7. Incontinence of feces, unspecified fecal incontinence type  Starting pelvic PT and having a diagnostic colonoscopy this month. 8. Urge incontinence of urine  Starting pelvic PT this week. Follow-up 6 months    Subjective      HPI  Review of Systems   Constitutional: Negative for chills, fatigue and fever. HENT: Negative for ear pain and sore throat. Eyes: Negative for pain and visual disturbance. Respiratory: Negative for cough and shortness of breath. Cardiovascular: Negative for chest pain and palpitations. Gastrointestinal: Negative for abdominal pain, constipation, diarrhea and vomiting. Stool incontinence.    Genitourinary: Negative for dysuria and hematuria. Urine incontinence   Musculoskeletal: Negative for arthralgias and back pain. Skin: Negative for color change and rash. Neurological: Negative for seizures and syncope. Psychiatric/Behavioral: Negative. All other systems reviewed and are negative. Current Outpatient Medications on File Prior to Visit   Medication Sig   • Calcium 200 MG TABS Take by mouth daily   • cycloSPORINE (RESTASIS) 0.05 % ophthalmic emulsion Administer 1 drop to both eyes every 12 (twelve) hours    • multivitamin (THERAGRAN) TABS Take 1 tablet by mouth daily   • Omega-3 Fatty Acids (FISH OIL PO) Take by mouth Every other day   • Vitamin D, Cholecalciferol, 1000 units CAPS Take 5,000 Units by mouth daily Patient will start taking in fall and winter. • [DISCONTINUED] atorvastatin (LIPITOR) 10 mg tablet TAKE 1 TABLET BY MOUTH EVERY DAY AT NIGHT   • [DISCONTINUED] levothyroxine 100 mcg tablet Take 100 mcg by mouth daily   • estradiol (Estring) 2 MG vaginal ring Insert 2 mg into the vagina every 3 (three) months follow package directions (Patient not taking: Reported on 8/14/2023)       Objective     /68 (BP Location: Right arm, Patient Position: Sitting, Cuff Size: Standard)   Pulse 55   Temp (!) 97.3 °F (36.3 °C) (Temporal)   Resp 17   Ht 5' 5" (1.651 m)   Wt 66.5 kg (146 lb 9.6 oz)   SpO2 94%   BMI 24.40 kg/m²     Physical Exam  Vitals and nursing note reviewed. Constitutional:       Appearance: Normal appearance. HENT:      Head: Normocephalic and atraumatic. Right Ear: Tympanic membrane normal.      Left Ear: Tympanic membrane normal.   Eyes:      Extraocular Movements: Extraocular movements intact. Pupils: Pupils are equal, round, and reactive to light. Cardiovascular:      Rate and Rhythm: Normal rate and regular rhythm. Pulses: Normal pulses. Heart sounds: Normal heart sounds.    Pulmonary:      Effort: Pulmonary effort is normal.      Breath sounds: Normal breath sounds. Abdominal:      Palpations: Abdomen is soft. Musculoskeletal:         General: Normal range of motion. Cervical back: Normal range of motion and neck supple. Skin:     General: Skin is warm and dry. Neurological:      General: No focal deficit present. Mental Status: She is alert and oriented to person, place, and time.    Psychiatric:         Mood and Affect: Mood normal.         Behavior: Behavior normal.       ROME Miranda

## 2023-10-23 ENCOUNTER — HOSPITAL ENCOUNTER (OUTPATIENT)
Dept: CT IMAGING | Facility: CLINIC | Age: 68
Discharge: HOME | End: 2023-10-23
Payer: MEDICARE

## 2023-10-23 DIAGNOSIS — G43.909 MIGRAINE WITHOUT STATUS MIGRAINOSUS, NOT INTRACTABLE, UNSPECIFIED MIGRAINE TYPE: ICD-10-CM

## 2023-10-23 PROCEDURE — G1004 CDSM NDSC: HCPCS

## 2023-10-23 PROCEDURE — 70486 CT MAXILLOFACIAL W/O DYE: CPT

## 2023-12-20 ENCOUNTER — HOSPITAL ENCOUNTER (OUTPATIENT)
Age: 68
Discharge: HOME | End: 2023-12-20
Payer: MEDICARE

## 2023-12-20 VITALS — BODY MASS INDEX: 24.32 KG/M2 | HEIGHT: 65 IN | WEIGHT: 146 LBS

## 2023-12-20 DIAGNOSIS — Z12.31 ENCOUNTER FOR SCREENING MAMMOGRAM FOR MALIGNANT NEOPLASM OF BREAST: ICD-10-CM

## 2023-12-20 PROCEDURE — 77067 SCR MAMMO BI INCL CAD: CPT

## 2023-12-20 PROCEDURE — 77063 BREAST TOMOSYNTHESIS BI: CPT

## 2023-12-31 ENCOUNTER — PATIENT MESSAGE (OUTPATIENT)
Dept: OBGYN CLINIC | Facility: CLINIC | Age: 68
End: 2023-12-31

## 2023-12-31 DIAGNOSIS — N95.2 VAGINAL ATROPHY: ICD-10-CM

## 2024-01-04 RX ORDER — ESTRADIOL 2 MG/1
RING VAGINAL
Qty: 1 EACH | Refills: 4 | Status: SHIPPED | OUTPATIENT
Start: 2024-01-04

## 2024-01-09 ENCOUNTER — OFFICE VISIT (OUTPATIENT)
Dept: OBGYN CLINIC | Facility: CLINIC | Age: 69
End: 2024-01-09
Payer: MEDICARE

## 2024-01-09 VITALS
BODY MASS INDEX: 24.66 KG/M2 | SYSTOLIC BLOOD PRESSURE: 116 MMHG | WEIGHT: 148 LBS | DIASTOLIC BLOOD PRESSURE: 74 MMHG | HEIGHT: 65 IN

## 2024-01-09 DIAGNOSIS — N95.2 VAGINAL ATROPHY: Primary | ICD-10-CM

## 2024-01-09 DIAGNOSIS — Z12.31 ENCOUNTER FOR SCREENING MAMMOGRAM FOR MALIGNANT NEOPLASM OF BREAST: ICD-10-CM

## 2024-01-09 DIAGNOSIS — R92.2 DENSE BREAST: ICD-10-CM

## 2024-01-09 DIAGNOSIS — R92.30 DENSE BREAST: ICD-10-CM

## 2024-01-09 PROCEDURE — 99213 OFFICE O/P EST LOW 20 MIN: CPT | Performed by: OBSTETRICS & GYNECOLOGY

## 2024-01-09 RX ORDER — CONJUGATED ESTROGENS 0.62 MG/G
1 CREAM VAGINAL WEEKLY
Qty: 30 G | Refills: 3 | Status: SHIPPED | OUTPATIENT
Start: 2024-01-09 | End: 2024-01-19 | Stop reason: ALTCHOICE

## 2024-01-09 NOTE — PROGRESS NOTES
Subjective     Lainey Baldwin is a 69 y.o.  female here for a follow-up visit.  Patient reports no bleeding or discharge when due to change Estring does notice some insomnia.  Estring is somewhat cost prohibitive would like to look into cost of alternative reviewed options would like to try Premarin cream will find out cost when it is prescribed and will contact us regarding what she needs prescribed upcoming.  We discussed using once a week at slightly higher dose and evaluating based on symptoms.  This generally helps her vaginal dryness and dyspareunia.  She is having some increased issues with urinary incontinence possibly detrusor symptoms as well as poor colorectal control has seen physician for IBS and has also seen Dr. Rowley for evaluation colonoscopy.  Rectal tone seems sufficient but not complete.  They have offered option of stimulator implant.  Discussed and reviewed with patient also discussed biofeedback and pelvic stim as possible in between option, patient is already doing pelvic physical therapy with benefit but not complete control of symptoms.     Gynecologic History  No LMP recorded. Patient is postmenopausal.  Contraception: post menopausal status  Last Pap: 2020. Results were: normal  Last mammogram: 2023. Results were: normal, dense    Obstetric History  OB History    Para Term  AB Living   4 2 2   2 1   SAB IAB Ectopic Multiple Live Births   1   1   1      # Outcome Date GA Lbr Aristides/2nd Weight Sex Delivery Anes PTL Lv   4 Ectopic            3 SAB            2 Term            1 Term                  The following portions of the patient's history were reviewed and updated as appropriate: allergies, current medications, past family history, past medical history, past social history, past surgical history, and problem list.    Review of Systems  Review of Systems   Constitutional: Negative.  Negative for chills, fatigue, fever and unexpected weight change.  "  HENT: Negative.  Negative for dental problem, sinus pressure and sinus pain.    Eyes: Negative.  Negative for visual disturbance.   Respiratory: Negative.  Negative for cough, shortness of breath and wheezing.    Cardiovascular: Negative.  Negative for chest pain and leg swelling.   Gastrointestinal:  Negative for constipation, diarrhea, nausea and vomiting.        Anal incontinence   Genitourinary:  Negative for menstrual problem, pelvic pain and urgency.        Vaginal dryness.  Urinary incontinence   Musculoskeletal: Negative.  Negative for back pain.   Allergic/Immunologic: Positive for environmental allergies.   Neurological: Negative.  Negative for dizziness and headaches.        Objective     /74 (BP Location: Left arm, Patient Position: Sitting, Cuff Size: Standard)   Ht 5' 5\" (1.651 m)   Wt 67.1 kg (148 lb)   BMI 24.63 kg/m²   General appearance: alert and oriented, in no acute distress  Head: Normocephalic, without obvious abnormality, atraumatic  Lungs: clear to auscultation bilaterally  Breasts: normal appearance, no masses or tenderness, No nipple retraction or dimpling, No nipple discharge or bleeding  Heart: regular rate and rhythm, S1, S2 normal, no murmur, click, rub or gallop  Abdomen: soft, non-tender; bowel sounds normal; no masses,  no organomegaly  Extremities: extremities normal, warm and well-perfused; no cyanosis, clubbing, or edema      Assessment  69-year-old G4, P1 here for follow-up would like to look into alternatives to Estring due to cost, if other alternatives are insufficient for symptoms or not better covered patient may return to Estring.  Also with dense breast on mammogram.  Discussed and reviewed.  Will look into ABUS coverage will order for patient with next mammogram.     Plan  Premarin cream 1 g once a week discussed if symptoms not well needed we can consider half dose twice weekly, if patient desires return to Estring will call given slip for mammogram and ABUS " return in 1 year or sooner as needed

## 2024-01-18 ENCOUNTER — TELEPHONE (OUTPATIENT)
Dept: OBGYN CLINIC | Facility: CLINIC | Age: 69
End: 2024-01-18

## 2024-01-18 NOTE — TELEPHONE ENCOUNTER
Patient left message on machine - Dr. Villafana called in prescription for Premarin cream to compare price with Estring. Now, all prescriptions were cancelled so unable to compare. Looks like price will be close but only way can compare price is to have a prescription. Requesting prescription for Estring sent to [facility] pharmacy in [place] [address] so able to compare for price.

## 2024-01-19 DIAGNOSIS — N95.2 VAGINAL ATROPHY: Primary | ICD-10-CM

## 2024-01-19 RX ORDER — ESTRADIOL 2 MG/1
1 RING VAGINAL
Qty: 1 EACH | Refills: 3 | Status: SHIPPED | OUTPATIENT
Start: 2024-01-19 | End: 2024-04-18

## 2024-01-19 NOTE — TELEPHONE ENCOUNTER
Patient left message on machine - requesting prescription and return call. Thinks will stay with Estring based on provided cost for Premarin but would like to verify cost and needs prescription to compare

## 2024-01-29 ENCOUNTER — APPOINTMENT (OUTPATIENT)
Age: 69
End: 2024-01-29
Payer: MEDICARE

## 2024-01-29 DIAGNOSIS — E55.9 VITAMIN D DEFICIENCY: ICD-10-CM

## 2024-01-29 DIAGNOSIS — E78.2 MIXED HYPERLIPIDEMIA: ICD-10-CM

## 2024-01-29 DIAGNOSIS — E03.9 ACQUIRED HYPOTHYROIDISM: ICD-10-CM

## 2024-01-29 LAB
ALBUMIN SERPL BCP-MCNC: 4.2 G/DL (ref 3.5–5)
ALP SERPL-CCNC: 88 U/L (ref 34–104)
ALT SERPL W P-5'-P-CCNC: 20 U/L (ref 7–52)
ANION GAP SERPL CALCULATED.3IONS-SCNC: 8 MMOL/L
AST SERPL W P-5'-P-CCNC: 30 U/L (ref 13–39)
BASOPHILS # BLD AUTO: 0.07 THOUSANDS/ÂΜL (ref 0–0.1)
BASOPHILS NFR BLD AUTO: 1 % (ref 0–1)
BILIRUB SERPL-MCNC: 0.47 MG/DL (ref 0.2–1)
BUN SERPL-MCNC: 16 MG/DL (ref 5–25)
CALCIUM SERPL-MCNC: 9.8 MG/DL (ref 8.4–10.2)
CHLORIDE SERPL-SCNC: 102 MMOL/L (ref 96–108)
CHOLEST SERPL-MCNC: 195 MG/DL
CO2 SERPL-SCNC: 29 MMOL/L (ref 21–32)
CREAT SERPL-MCNC: 0.98 MG/DL (ref 0.6–1.3)
EOSINOPHIL # BLD AUTO: 0.17 THOUSAND/ÂΜL (ref 0–0.61)
EOSINOPHIL NFR BLD AUTO: 3 % (ref 0–6)
ERYTHROCYTE [DISTWIDTH] IN BLOOD BY AUTOMATED COUNT: 12.3 % (ref 11.6–15.1)
GFR SERPL CREATININE-BSD FRML MDRD: 59 ML/MIN/1.73SQ M
GLUCOSE P FAST SERPL-MCNC: 81 MG/DL (ref 65–99)
HCT VFR BLD AUTO: 41.9 % (ref 34.8–46.1)
HDLC SERPL-MCNC: 73 MG/DL
HGB BLD-MCNC: 13.5 G/DL (ref 11.5–15.4)
IMM GRANULOCYTES # BLD AUTO: 0.01 THOUSAND/UL (ref 0–0.2)
IMM GRANULOCYTES NFR BLD AUTO: 0 % (ref 0–2)
LDLC SERPL CALC-MCNC: 113 MG/DL (ref 0–100)
LYMPHOCYTES # BLD AUTO: 1.99 THOUSANDS/ÂΜL (ref 0.6–4.47)
LYMPHOCYTES NFR BLD AUTO: 40 % (ref 14–44)
MCH RBC QN AUTO: 29.2 PG (ref 26.8–34.3)
MCHC RBC AUTO-ENTMCNC: 32.2 G/DL (ref 31.4–37.4)
MCV RBC AUTO: 91 FL (ref 82–98)
MONOCYTES # BLD AUTO: 0.52 THOUSAND/ÂΜL (ref 0.17–1.22)
MONOCYTES NFR BLD AUTO: 10 % (ref 4–12)
NEUTROPHILS # BLD AUTO: 2.24 THOUSANDS/ÂΜL (ref 1.85–7.62)
NEUTS SEG NFR BLD AUTO: 46 % (ref 43–75)
NRBC BLD AUTO-RTO: 0 /100 WBCS
PLATELET # BLD AUTO: 262 THOUSANDS/UL (ref 149–390)
PMV BLD AUTO: 10.3 FL (ref 8.9–12.7)
POTASSIUM SERPL-SCNC: 4.3 MMOL/L (ref 3.5–5.3)
PROT SERPL-MCNC: 6.9 G/DL (ref 6.4–8.4)
RBC # BLD AUTO: 4.62 MILLION/UL (ref 3.81–5.12)
SODIUM SERPL-SCNC: 139 MMOL/L (ref 135–147)
TRIGL SERPL-MCNC: 46 MG/DL
TSH SERPL DL<=0.05 MIU/L-ACNC: 2.42 UIU/ML (ref 0.45–4.5)
WBC # BLD AUTO: 5 THOUSAND/UL (ref 4.31–10.16)

## 2024-01-29 PROCEDURE — 36415 COLL VENOUS BLD VENIPUNCTURE: CPT

## 2024-01-29 PROCEDURE — 85025 COMPLETE CBC W/AUTO DIFF WBC: CPT

## 2024-01-29 PROCEDURE — 80053 COMPREHEN METABOLIC PANEL: CPT

## 2024-01-29 PROCEDURE — 84443 ASSAY THYROID STIM HORMONE: CPT

## 2024-01-29 PROCEDURE — 80061 LIPID PANEL: CPT

## 2024-01-31 ENCOUNTER — RA CDI HCC (OUTPATIENT)
Dept: OTHER | Facility: HOSPITAL | Age: 69
End: 2024-01-31

## 2024-02-01 ENCOUNTER — OFFICE VISIT (OUTPATIENT)
Age: 69
End: 2024-02-01
Payer: MEDICARE

## 2024-02-01 VITALS
WEIGHT: 146.2 LBS | SYSTOLIC BLOOD PRESSURE: 118 MMHG | HEART RATE: 52 BPM | HEIGHT: 65 IN | RESPIRATION RATE: 16 BRPM | DIASTOLIC BLOOD PRESSURE: 72 MMHG | BODY MASS INDEX: 24.36 KG/M2 | OXYGEN SATURATION: 98 % | TEMPERATURE: 97.6 F

## 2024-02-01 DIAGNOSIS — F33.8 SEASONAL DEPRESSION (HCC): ICD-10-CM

## 2024-02-01 DIAGNOSIS — N39.46 MIXED STRESS AND URGE URINARY INCONTINENCE: ICD-10-CM

## 2024-02-01 DIAGNOSIS — E03.9 ACQUIRED HYPOTHYROIDISM: ICD-10-CM

## 2024-02-01 DIAGNOSIS — R15.9 INCONTINENCE OF FECES, UNSPECIFIED FECAL INCONTINENCE TYPE: ICD-10-CM

## 2024-02-01 DIAGNOSIS — E78.2 MIXED HYPERLIPIDEMIA: Primary | ICD-10-CM

## 2024-02-01 DIAGNOSIS — Z00.00 MEDICARE ANNUAL WELLNESS VISIT, SUBSEQUENT: ICD-10-CM

## 2024-02-01 DIAGNOSIS — E55.9 VITAMIN D DEFICIENCY: ICD-10-CM

## 2024-02-01 PROBLEM — M67.40 GANGLION CYST: Status: ACTIVE | Noted: 2024-02-01

## 2024-02-01 PROCEDURE — G0439 PPPS, SUBSEQ VISIT: HCPCS | Performed by: INTERNAL MEDICINE

## 2024-02-01 PROCEDURE — 99214 OFFICE O/P EST MOD 30 MIN: CPT | Performed by: INTERNAL MEDICINE

## 2024-02-01 NOTE — PROGRESS NOTES
Assessment and Plan:   Continue a low-fat diet, continue taking the atorvastatin regularly as her levels of the cholesterol are much better now.  TSH is stable, continue levothyroxine 100 mcg daily  Not taking anything for the depression at present  Continue vitamin D  She has an appointment with the urologist for the urinary incontinence.  She has been following up with the gastric surgeon and was suggested to get the stimulator inserted for the fecal incontinence.  She is thinking about it.  I advised her to try it if it is impacting her quality of life.      Problem List Items Addressed This Visit    None      Depression Screening and Follow-up Plan: Patient was screened for depression during today's encounter. They screened negative with a PHQ-9 score of 0.    Urinary Incontinence Plan of Care: counseling topics discussed: practice Kegel (pelvic floor strengthening) exercises, limit alcohol, caffeine, spicy foods, and acidic foods and limiting fluid intake 3-4 hours before bed.       Preventive health issues were discussed with patient, and age appropriate screening tests were ordered as noted in patient's After Visit Summary.  Personalized health advice and appropriate referrals for health education or preventive services given if needed, as noted in patient's After Visit Summary.     History of Present Illness:     Patient presents for a Medicare Wellness Visit    HPI  Patient is here for follow-up of hyperlipidemia, hypothyroidism, depression, vitamin D deficiency, urinary incontinence and fecal incontinence.      Patient Care Team:  Brandi Rogers MD as PCP - General (Internal Medicine)     Review of Systems:     Review of Systems   Constitutional:  Negative for chills, diaphoresis, fatigue and fever.   HENT:  Negative for congestion, ear discharge, ear pain, hearing loss, postnasal drip, rhinorrhea, sinus pressure, sinus pain, sneezing, sore throat and voice change.    Eyes:  Negative for pain, discharge,  redness and visual disturbance.   Respiratory:  Negative for cough, chest tightness, shortness of breath and wheezing.    Cardiovascular:  Negative for chest pain, palpitations and leg swelling.   Gastrointestinal:  Negative for abdominal distention, abdominal pain, blood in stool, constipation, diarrhea, nausea and vomiting.   Endocrine: Negative for cold intolerance, heat intolerance, polydipsia, polyphagia and polyuria.   Genitourinary:  Negative for dysuria, flank pain, frequency, hematuria and urgency.        Complains of urinary and fecal incontinence   Musculoskeletal:  Negative for arthralgias, back pain, gait problem, joint swelling, myalgias, neck pain and neck stiffness.   Skin:  Negative for rash.   Neurological:  Negative for dizziness, tremors, syncope, facial asymmetry, speech difficulty, weakness, light-headedness, numbness and headaches.   Hematological:  Does not bruise/bleed easily.   Psychiatric/Behavioral:  Negative for behavioral problems, confusion and sleep disturbance. The patient is not nervous/anxious.         Problem List:     Patient Active Problem List   Diagnosis    Facet arthropathy, lumbar    Stenosis of lateral recess of lumbar spine    Spondylolisthesis    Lumbar radiculopathy    Spondylosis of cervical region without myelopathy or radiculopathy    Congenital deafness    Vitamin D deficiency    Osteopenia of multiple sites    Seasonal depression (HCC)    Acquired hypothyroidism    Mixed hyperlipidemia    Seasonal allergic rhinitis due to pollen    Irritable bowel syndrome with diarrhea    Incontinence of feces      Past Medical and Surgical History:     Past Medical History:   Diagnosis Date    Age-related osteoporosis without current pathological fracture  9/9/2021    Chronic low back pain 2/6/2020    Disease of thyroid gland     Ectopic pregnancy 1984    Encounter for annual routine gynecological examination 5/30/2019    Encounter for postoperative care related to surgical joint  fusion 10/28/2021    Encounter for screening mammogram for malignant neoplasm of breast 9/9/2021    High cholesterol     Hypothyroidism     Mid back pain 9/14/2021    Migraine     During perimenopause, not a problem now    Miscarriage     ectopic 1984, miscarriage 1992    Osteopenia     Peroneal tendonitis     Vaginal atrophy 5/30/2019    Varicella     Date unknown     Past Surgical History:   Procedure Laterality Date    COLON MANOMETRY      COLONOSCOPY      DENTAL SURGERY      removal of broken tooth and implant to be done    DILATION AND CURETTAGE OF UTERUS      ECTOPIC PREGNANCY SURGERY      NASAL SEPTUM SURGERY      VA ARTHRODESIS COMBINED TQ 1NTRSPC LUMBAR N/A 6/7/2021    Procedure: Right L4-5 transverse lumbar interbody fusion and decompression;  Surgeon: Hilario Trevizo MD;  Location: [facility];  Service: Neurosurgery    REFRACTIVE SURGERY      VAGINAL DELIVERY        Family History:     Family History   Problem Relation Age of Onset    Lymphoma Mother     Cancer Mother         Lymphoma    Hypertension Mother     Migraines Mother     Ankylosing spondylitis Father     Arthritis Father     Lung cancer Father     No Known Problems Sister     Diabetes Paternal Grandmother     Heart disease Paternal Grandfather     Hypertension Paternal Grandfather     Breast cancer Paternal Aunt         unknown age    Crohn's disease Cousin       Social History:     Social History     Socioeconomic History    Marital status: /Civil Union     Spouse name: None    Number of children: None    Years of education: None    Highest education level: None   Occupational History    None   Tobacco Use    Smoking status: Never    Smokeless tobacco: Never   Vaping Use    Vaping status: Never Used   Substance and Sexual Activity    Alcohol use: Not Currently     Comment: Less than one per month    Drug use: Never    Sexual activity: Yes     Partners: Male     Birth control/protection: Post-menopausal     Comment: pt declines hiv std  testing   Other Topics Concern    None   Social History Narrative    None     Social Determinants of Health     Financial Resource Strain: Low Risk  (1/23/2023)    Overall Financial Resource Strain (CARDIA)     Difficulty of Paying Living Expenses: Not hard at all   Food Insecurity: Not on file   Transportation Needs: No Transportation Needs (1/23/2023)    PRAPARE - Transportation     Lack of Transportation (Medical): No     Lack of Transportation (Non-Medical): No   Physical Activity: Sufficiently Active (1/23/2023)    Exercise Vital Sign     Days of Exercise per Week: 4 days     Minutes of Exercise per Session: 60 min   Stress: No Stress Concern Present (8/14/2023)    [employer] - Occupational Stress Questionnaire     Feeling of Stress : Not at all   Social Connections: Not on file   Intimate Partner Violence: Not on file   Housing Stability: Not on file      Medications and Allergies:     Current Outpatient Medications   Medication Sig Dispense Refill    atorvastatin (LIPITOR) 10 mg tablet Take 1 tablet (10 mg total) by mouth daily 90 tablet 3    Calcium 200 MG TABS Take by mouth daily      cycloSPORINE (RESTASIS) 0.05 % ophthalmic emulsion Administer 1 drop to both eyes every 12 (twelve) hours       Estradiol (Estring) 7.5 MCG/24HR RING Insert 1 Ring into the vagina every 3 (three) months 1 each 3    levothyroxine 100 mcg tablet Take 1 tablet (100 mcg total) by mouth daily 90 tablet 3    multivitamin (THERAGRAN) TABS Take 1 tablet by mouth daily      Omega-3 Fatty Acids (FISH OIL PO) Take by mouth Every other day      Vitamin D, Cholecalciferol, 1000 units CAPS Take 5,000 Units by mouth daily Patient will start taking in fall and winter.       No current facility-administered medications for this visit.     Allergies   Allergen Reactions    Meloxicam Other (See Comments)     Gets very bad headache and upset stomach      Immunizations:     Immunization History   Administered Date(s)  Administered    COVID-19 PFIZER VACCINE 0.3 ML IM 02/22/2021, 03/16/2021, 09/28/2021    COVID-19 Pfizer Vac BIVALENT James-sucrose 12 Yr+ IM 11/21/2022, 07/07/2023    COVID-19 Pfizer mRNA vacc PF james-sucrose 12 yr and older (Comirnaty) 11/16/2023    COVID-19 Pfizer vac (James-sucrose, gray cap) 12 yr+ IM 04/12/2022    H1N1 Inj 12/29/2009    H1N1, All Formulations 12/29/2009    INFLUENZA 10/02/2009, 09/29/2010, 09/16/2015, 09/20/2017, 09/26/2018, 09/26/2018, 08/01/2020, 09/13/2020, 08/11/2021, 08/19/2021, 09/16/2022    Influenza Injectable, MDCK, Preservative Free, Quadrivalent, 0.5 mL 09/27/2019    Influenza Quadrivalent Preservative Free 3 years and older IM 09/16/2015    Influenza Split 09/13/2013    Influenza Split High Dose Preservative Free IM 08/01/2020    Influenza, injectable, quadrivalent, preservative free 0.5 mL 09/26/2018    Influenza, seasonal, injectable 10/02/2009, 09/29/2010    Influenza, seasonal, injectable, preservative free 09/14/2011, 10/06/2014    MMR 07/07/2020    Palivizumab (RSV-MAb) 09/07/2023    Pneumococcal 20-valent Conjugate (Historical) 01/25/2024    Pneumococcal Conjugate 13-Valent 09/13/2018    Pneumococcal Polysaccharide PPV23 10/12/2020    Tdap 01/25/2016    Tuberculin Skin Test-PPD Intradermal 08/02/2006, 09/26/2011, 03/23/2022    Zoster 08/04/2006, 03/27/2015, 04/08/2019    Zoster Vaccine Recombinant 12/26/2018, 01/09/2023, 03/13/2023    influenza, injectable, quadrivalent 09/26/2018    influenza, trivalent, adjuvanted 11/16/2023      Health Maintenance:         Topic Date Due    Breast Cancer Screening: Mammogram  12/20/2024    Colorectal Cancer Screening  08/21/2026    Hepatitis C Screening  Completed         Topic Date Due    COVID-19 Vaccine (8 - 2023-24 season) 01/11/2024      Medicare Screening Tests and Risk Assessments:     Lainey is here for her Subsequent Wellness visit. Last Medicare Wellness visit information reviewed, patient interviewed, no change since last AWV.  "    Depression Screening:   PHQ-9 Score: 0      PREVENTIVE SCREENINGS      Cardiovascular Screening:    General: Screening Not Indicated and History Lipid Disorder      Diabetes Screening:     General: Screening Current      Colorectal Cancer Screening:     General: Screening Current      Breast Cancer Screening:     General: Screening Current      Cervical Cancer Screening:    General: Screening Not Indicated      Lung Cancer Screening:     General: Screening Not Indicated      Hepatitis C Screening:    General: Screening Current    No results found.     Physical Exam:     /72 (BP Location: Left arm, Patient Position: Sitting, Cuff Size: Standard)   Pulse (!) 52   Temp 97.6 °F (36.4 °C) (Tympanic)   Resp 16   Ht 5' 5\" (1.651 m)   Wt 66.3 kg (146 lb 3.2 oz)   SpO2 98%   BMI 24.33 kg/m²     Physical Exam  Constitutional:       General: She is not in acute distress.     Appearance: She is well-developed. She is not diaphoretic.   HENT:      Head: Normocephalic and atraumatic.      Right Ear: External ear normal.      Left Ear: External ear normal.      Nose: Nose normal.   Eyes:      General: No scleral icterus.        Right eye: No discharge.         Left eye: No discharge.      Conjunctiva/sclera: Conjunctivae normal.   Cardiovascular:      Rate and Rhythm: Normal rate and regular rhythm.      Heart sounds: Normal heart sounds. No murmur heard.     No friction rub. No gallop.   Pulmonary:      Effort: Pulmonary effort is normal. No respiratory distress.      Breath sounds: Normal breath sounds. No wheezing or rales.   Abdominal:      General: Bowel sounds are normal. There is no distension.      Palpations: Abdomen is soft.      Tenderness: There is no abdominal tenderness. There is no guarding or rebound.   Musculoskeletal:         General: No tenderness.   Skin:     General: Skin is warm and dry.      Findings: No erythema or rash.   Neurological:      Mental Status: She is alert and oriented to " person, place, and time.      Cranial Nerves: No cranial nerve deficit.      Sensory: No sensory deficit.      Motor: No abnormal muscle tone.   Psychiatric:         Behavior: Behavior normal.          Brandi Rogers MD

## 2024-02-01 NOTE — PATIENT INSTRUCTIONS
Medicare Preventive Visit Patient Instructions  Thank you for completing your Welcome to Medicare Visit or Medicare Annual Wellness Visit today. Your next wellness visit will be due in one year (2/1/2025).  The screening/preventive services that you may require over the next 5-10 years are detailed below. Some tests may not apply to you based off risk factors and/or age. Screening tests ordered at today's visit but not completed yet may show as past due. Also, please note that scanned in results may not display below.  Preventive Screenings:  Service Recommendations Previous Testing/Comments   Colorectal Cancer Screening  * Colonoscopy    * Fecal Occult Blood Test (FOBT)/Fecal Immunochemical Test (FIT)  * Fecal DNA/Cologuard Test  * Flexible Sigmoidoscopy Age: 45-75 years old   Colonoscopy: every 10 years (may be performed more frequently if at higher risk)  OR  FOBT/FIT: every 1 year  OR  Cologuard: every 3 years  OR  Sigmoidoscopy: every 5 years  Screening may be recommended earlier than age 45 if at higher risk for colorectal cancer. Also, an individualized decision between you and your healthcare provider will decide whether screening between the ages of 76-85 would be appropriate. Colonoscopy: 08/21/2023  FOBT/FIT: 09/06/2022  Cologuard: 09/06/2022  Sigmoidoscopy: 09/06/2022    Screening Current     Breast Cancer Screening Age: 40+ years old  Frequency: every 1-2 years  Not required if history of left and right mastectomy Mammogram: 12/20/2023    Screening Current   Cervical Cancer Screening Between the ages of 21-29, pap smear recommended once every 3 years.   Between the ages of 30-65, can perform pap smear with HPV co-testing every 5 years.   Recommendations may differ for women with a history of total hysterectomy, cervical cancer, or abnormal pap smears in past. Pap Smear: 12/05/2022    Screening Not Indicated   Hepatitis C Screening Once for adults born between 1945 and 1965  More frequently in patients  at high risk for Hepatitis C Hep C Antibody: 08/11/2023    Screening Current   Diabetes Screening 1-2 times per year if you're at risk for diabetes or have pre-diabetes Fasting glucose: 81 mg/dL (1/29/2024)  A1C: 5.4 % (5/24/2021)  Screening Current   Cholesterol Screening Once every 5 years if you don't have a lipid disorder. May order more often based on risk factors. Lipid panel: 01/29/2024    Screening Not Indicated  History Lipid Disorder     Other Preventive Screenings Covered by Medicare:  Abdominal Aortic Aneurysm (AAA) Screening: covered once if your at risk. You're considered to be at risk if you have a family history of AAA.  Lung Cancer Screening: covers low dose CT scan once per year if you meet all of the following conditions: (1) Age 55-77; (2) No signs or symptoms of lung cancer; (3) Current smoker or have quit smoking within the last 15 years; (4) You have a tobacco smoking history of at least 20 pack years (packs per day multiplied by number of years you smoked); (5) You get a written order from a healthcare provider.  Glaucoma Screening: covered annually if you're considered high risk: (1) You have diabetes OR (2) Family history of glaucoma OR (3)  aged 50 and older OR (4)  American aged 65 and older  Osteoporosis Screening: covered every 2 years if you meet one of the following conditions: (1) You're estrogen deficient and at risk for osteoporosis based off medical history and other findings; (2) Have a vertebral abnormality; (3) On glucocorticoid therapy for more than 3 months; (4) Have primary hyperparathyroidism; (5) On osteoporosis medications and need to assess response to drug therapy.   Last bone density test (DXA Scan): 03/01/2022.  HIV Screening: covered annually if you're between the age of 15-65. Also covered annually if you are younger than 15 and older than 65 with risk factors for HIV infection. For pregnant patients, it is covered up to 3 times per  pregnancy.    Immunizations:  Immunization Recommendations   Influenza Vaccine Annual influenza vaccination during flu season is recommended for all persons aged >= 6 months who do not have contraindications   Pneumococcal Vaccine   * Pneumococcal conjugate vaccine = PCV13 (Prevnar 13), PCV15 (Vaxneuvance), PCV20 (Prevnar 20)  * Pneumococcal polysaccharide vaccine = PPSV23 (Pneumovax) Adults 19-63 yo with certain risk factors or if 65+ yo  If never received any pneumonia vaccine: recommend Prevnar 20 (PCV20)  Give PCV20 if previously received 1 dose of PCV13 or PPSV23   Hepatitis B Vaccine 3 dose series if at intermediate or high risk (ex: diabetes, end stage renal disease, liver disease)   Respiratory syncytial virus (RSV) Vaccine - COVERED BY MEDICARE PART D  * RSVPreF3 (Arexvy) CDC recommends that adults 60 years of age and older may receive a single dose of RSV vaccine using shared clinical decision-making (SCDM)   Tetanus (Td) Vaccine - COST NOT COVERED BY MEDICARE PART B Following completion of primary series, a booster dose should be given every 10 years to maintain immunity against tetanus. Td may also be given as tetanus wound prophylaxis.   Tdap Vaccine - COST NOT COVERED BY MEDICARE PART B Recommended at least once for all adults. For pregnant patients, recommended with each pregnancy.   Shingles Vaccine (Shingrix) - COST NOT COVERED BY MEDICARE PART B  2 shot series recommended in those 19 years and older who have or will have weakened immune systems or those 50 years and older     Health Maintenance Due:      Topic Date Due   • Breast Cancer Screening: Mammogram  12/20/2024   • Colorectal Cancer Screening  08/21/2026   • Hepatitis C Screening  Completed     Immunizations Due:      Topic Date Due   • COVID-19 Vaccine (8 - 2023-24 season) 01/11/2024     Advance Directives   What are advance directives?  Advance directives are legal documents that state your wishes and plans for medical care. These plans  are made ahead of time in case you lose your ability to make decisions for yourself. Advance directives can apply to any medical decision, such as the treatments you want, and if you want to donate organs.   What are the types of advance directives?  There are many types of advance directives, and each state has rules about how to use them. You may choose a combination of any of the following:  Living will:  This is a written record of the treatment you want. You can also choose which treatments you do not want, which to limit, and which to stop at a certain time. This includes surgery, medicine, IV fluid, and tube feedings.   Durable power of  for healthcare (DPAHC):  This is a written record that states who you want to make healthcare choices for you when you are unable to make them for yourself. This person, called a proxy, is usually a family member or a friend. You may choose more than 1 proxy.  Do not resuscitate (DNR) order:  A DNR order is used in case your heart stops beating or you stop breathing. It is a request not to have certain forms of treatment, such as CPR. A DNR order may be included in other types of advance directives.  Medical directive:  This covers the care that you want if you are in a coma, near death, or unable to make decisions for yourself. You can list the treatments you want for each condition. Treatment may include pain medicine, surgery, blood transfusions, dialysis, IV or tube feedings, and a ventilator (breathing machine).  Values history:  This document has questions about your views, beliefs, and how you feel and think about life. This information can help others choose the care that you would choose.  Why are advance directives important?  An advance directive helps you control your care. Although spoken wishes may be used, it is better to have your wishes written down. Spoken wishes can be misunderstood, or not followed. Treatments may be given even if you do not want  them. An advance directive may make it easier for your family to make difficult choices about your care.   Urinary Incontinence   Urinary incontinence (UI)  is when you lose control of your bladder. UI develops because your bladder cannot store or empty urine properly. The 3 most common types of UI are stress incontinence, urge incontinence, or both.  Medicines:   May be given to help strengthen your bladder control. Report any side effects of medication to your healthcare provider.  Do pelvic muscle exercises often:  Your pelvic muscles help you stop urinating. Squeeze these muscles tight for 5 seconds, then relax for 5 seconds. Gradually work up to squeezing for 10 seconds. Do 3 sets of 15 repetitions a day, or as directed. This will help strengthen your pelvic muscles and improve bladder control.  Train your bladder:  Go to the bathroom at set times, such as every 2 hours, even if you do not feel the urge to go. You can also try to hold your urine when you feel the urge to go. For example, hold your urine for 5 minutes when you feel the urge to go. As that becomes easier, hold your urine for 10 minutes.   Self-care:   Keep a UI record.  Write down how often you leak urine and how much you leak. Make a note of what you were doing when you leaked urine.  Drink liquids as directed. You may need to limit the amount of liquid you drink to help control your urine leakage. Do not drink any liquid right before you go to bed. Limit or do not have drinks that contain caffeine or alcohol.   Prevent constipation.  Eat a variety of high-fiber foods. Good examples are high-fiber cereals, beans, vegetables, and whole-grain breads. Walking is the best way to trigger your intestines to have a bowel movement.  Exercise regularly and maintain a healthy weight.  Weight loss and exercise will decrease pressure on your bladder and help you control your leakage.   Use a catheter as directed  to help empty your bladder. A catheter is a  tiny, plastic tube that is put into your bladder to drain your urine.   Go to behavior therapy as directed.  Behavior therapy may be used to help you learn to control your urge to urinate.     © Copyright [employer] 2018 Information is for End User's use only and may not be sold, redistributed or otherwise used for commercial purposes. All illustrations and images included in CareNotes® are the copyrighted property of [employer] or [employer]

## 2024-02-14 ENCOUNTER — TELEPHONE (OUTPATIENT)
Dept: OBGYN CLINIC | Facility: CLINIC | Age: 69
End: 2024-02-14

## 2024-02-14 NOTE — TELEPHONE ENCOUNTER
Pt lmom, having issues picking up her estring. Not available right now. Was wondering if something else can be prescribed for now?

## 2024-02-15 NOTE — TELEPHONE ENCOUNTER
Per Dr. Broderick baez cream 1 gram three times a week.    Placed call to patient, left a non detailed message to return our call.

## 2024-02-15 NOTE — TELEPHONE ENCOUNTER
Placed call to patient, she is willing to go on estrace cream until estrings are available. Patient would like a 90 day supply to [facility].

## 2024-02-19 DIAGNOSIS — N95.2 VAGINAL ATROPHY: Primary | ICD-10-CM

## 2024-02-19 RX ORDER — ESTRADIOL 0.1 MG/G
1 CREAM VAGINAL 3 TIMES WEEKLY
Qty: 1 G | Refills: 0 | Status: SHIPPED | OUTPATIENT
Start: 2024-02-19 | End: 2024-05-19

## 2024-02-19 NOTE — TELEPHONE ENCOUNTER
Patient left message on machine - went to [facility] to  prescription of estrace cream. They do not have it. Is going out of town for a few weeks, next week. Requesting call placed to [facility] on [address] in [place], in chart, to call in prescription for estrace cream.

## 2024-02-19 NOTE — TELEPHONE ENCOUNTER
Pharmacy confirmed receipt of prescription electronically. Placed call and made patient aware. Patient will call office if any further issues obtaining.   [Sleep Disturbances] : sleep disturbances [Hot Flashes] : hot flashes [Nl] : Integumentary

## 2024-02-23 ENCOUNTER — NURSE TRIAGE (OUTPATIENT)
Age: 69
End: 2024-02-23

## 2024-02-23 NOTE — TELEPHONE ENCOUNTER
"Pt called to report she has tried PT and is considering sacral nerve stimulator as discussed at 6/27/23 OV. She has appt with Urology 4/4/24 for urinary incontinence. Questioning if sacral nerve stimulator would help with both urinary and fecal incontinence. Would like Dr Rowley's thoughts.     Pt prefers communication via [employer]  Cell # [phone]   Reason for Disposition   Nursing judgment    Answer Assessment - Initial Assessment Questions  1. REASON FOR CALL or QUESTION: \"What is your reason for calling today?\" or \"How can I best help you?\" or \"What question do you have that I can help answer?\"      Pt called to report she has tried PT and is considering sacral nerve stimulator as discussed at 6/27/23    Protocols used: Information Only Call - No Triage-ADULT-[place]    "

## 2024-02-23 NOTE — LETTER
Lainey Baldwin  [address]  [place] [address]        2/29/2024    Dear Lainey Baldwin,    Your surgery is scheduled for: May 7, 2024 and May 21, 2024   The hospital will call the evening prior to your surgery with your expected arrival time.   Location:[facility] [address] [facility] [address] [facility] [address]    CHECK LIST PRIOR TO OUTPATIENT SURGERY  It is your responsibility to obtain any/all referrals needed for your surgery if required by your insurance. Our office will contact you to discuss your insurance coverage for this procedure.     Special instructions required if you are taking any blood thinners. Please verify with the prescribing physician. Examples include Coumadin, Plavix, Xarelto, Eliquis, Pradaxa, etc.     Please check with your family physician if you are taking the following medications: Aspirin or any Aspirin containing medication, Gingko biloba, Ginseng, Feverfew, and/or Good Hope’s Wort. We suggest stopping these for 3 days.     The night before and the day of your surgery, wash from your neck to groin with chlorhexidine soap. This soap is available at most retail pharmacies under such brand names as Hibiclens, Endure or Aplicare.     Pre-admission testing Required:     NO X     NOTHING TO EAT OR DRINK AFTER MIDNIGHT PRIOR TO SURGERY.     Take ONE FLEET ENEMA one hour prior to leaving for the hospital.     Please do not hesitate to call our office with any questions regarding your surgery.

## 2024-02-29 ENCOUNTER — PREP FOR PROCEDURE (OUTPATIENT)
Age: 69
End: 2024-02-29

## 2024-02-29 DIAGNOSIS — R15.9 INCONTINENCE OF FECES, UNSPECIFIED FECAL INCONTINENCE TYPE: Primary | ICD-10-CM

## 2024-02-29 NOTE — TELEPHONE ENCOUNTER
Patient scheduled for surgery  5/724, 5/21/24  at [facility]. Instructions and PAT's gone over with and mailed to the patient.       Patient was advised to keep bowel diary for at least a couple weeks prior to procedure in case this is needed to obtain insurance approval for surgery.

## 2024-04-22 ENCOUNTER — ANESTHESIA EVENT (OUTPATIENT)
Dept: ANESTHESIOLOGY | Facility: HOSPITAL | Age: 69
End: 2024-04-22

## 2024-04-22 ENCOUNTER — ANESTHESIA (OUTPATIENT)
Dept: ANESTHESIOLOGY | Facility: HOSPITAL | Age: 69
End: 2024-04-22

## 2024-04-30 ENCOUNTER — ANESTHESIA EVENT (OUTPATIENT)
Dept: PERIOP | Facility: AMBULARY SURGERY CENTER | Age: 69
End: 2024-04-30
Payer: MEDICARE

## 2024-04-30 NOTE — PRE-PROCEDURE INSTRUCTIONS
Pre-Surgery Instructions:   Medication Instructions    atorvastatin (LIPITOR) 10 mg tablet Take day of surgery.    Calcium 200 MG TABS Stop taking 7 days prior to surgery.    cycloSPORINE (RESTASIS) 0.05 % ophthalmic emulsion Take day of surgery.    estradiol (ESTRACE VAGINAL) 0.1 mg/g vaginal cream Do not use night prior to surgery.    levothyroxine 100 mcg tablet Take day of surgery.    multivitamin (THERAGRAN) TABS Stop taking 7 days prior to surgery.    Omega-3 Fatty Acids (FISH OIL PO) Stop taking 7 days prior to surgery.    Vitamin D, Cholecalciferol, 1000 units CAPS Stop taking 7 days prior to surgery.   Medication instructions for day surgery reviewed. Please use only a sip of water to take your instructed medications. Avoid all over the counter vitamins, supplements and NSAIDS for one week prior to surgery per anesthesia guidelines. Tylenol is ok to take as needed.     You will receive a call one business day prior to surgery with an arrival time and hospital directions. If your surgery is scheduled on a Monday, the hospital will be calling you on the Friday prior to your surgery. If you have not heard from anyone by 8pm, please call the hospital supervisor through the hospital  at [phone]. ([place] [phone] or [place] [phone]).    Do not eat or drink anything after midnight the night before your surgery, including candy, mints, lifesavers, or chewing gum. Do not drink alcohol 24hrs before your surgery. Try not to smoke at least 24hrs before your surgery.       Follow the pre surgery showering instructions as listed in the “My Surgical Experience Booklet” or otherwise provided by your surgeon's office. Do not use a blade to shave the surgical area 1 week before surgery. It is okay to use a clean electric clippers up to 24 hours before surgery. Do not apply any lotions, creams, including makeup, cologne, deodorant, or perfumes after showering on the day of your surgery. Do not use dry  shampoo, hair spray, hair gel, or any type of hair products.     No contact lenses, eye make-up, or artificial eyelashes. Remove nail polish, including gel polish, and any artificial, gel, or acrylic nails if possible. Remove all jewelry including rings and body piercing jewelry.     Wear causal clothing that is easy to take on and off. Consider your type of surgery.    Keep any valuables, jewelry, piercings at home. Please bring any specially ordered equipment (sling, braces) if indicated.    Arrange for a responsible person to drive you to and from the hospital on the day of your surgery. Please confirm the visitor policy for the day of your procedure when you receive your phone call with an arrival time.     Call the surgeon's office with any new illnesses, exposures, or additional questions prior to surgery.    Please reference your “My Surgical Experience Booklet” for additional information to prepare for your upcoming surgery.

## 2024-04-30 NOTE — PRE-PROCEDURE INSTRUCTIONS
Pre-Surgery Instructions:   Medication Instructions    atorvastatin (LIPITOR) 10 mg tablet Take day of surgery.    Calcium 200 MG TABS Stop taking 7 days prior to surgery.    cycloSPORINE (RESTASIS) 0.05 % ophthalmic emulsion Take day of surgery.    estradiol (ESTRACE VAGINAL) 0.1 mg/g vaginal cream Do not use night prior to surgery.    levothyroxine 100 mcg tablet Take day of surgery.    multivitamin (THERAGRAN) TABS Stop taking 7 days prior to surgery.    Omega-3 Fatty Acids (FISH OIL PO) Stop taking 7 days prior to surgery.    Vitamin D, Cholecalciferol, 1000 units CAPS Stop taking 7 days prior to surgery.   Medication instructions for day surgery reviewed. Please use only a sip of water to take your instructed medications. Avoid all over the counter vitamins, supplements and NSAIDS for one week prior to surgery per anesthesia guidelines. Tylenol is ok to take as needed.     You will receive a call one business day prior to surgery with an arrival time and hospital directions. If your surgery is scheduled on a Monday, the hospital will be calling you on the Friday prior to your surgery. If you have not heard from anyone by 8pm, please call the hospital supervisor through the hospital  at [phone]. ([place] [phone] or [place] [phone]).    Do not eat or drink anything after midnight the night before your surgery, including candy, mints, lifesavers, or chewing gum. Do not drink alcohol 24hrs before your surgery. Try not to smoke at least 24hrs before your surgery.       Follow the pre surgery showering instructions as listed in the “My Surgical Experience Booklet” or otherwise provided by your surgeon's office. Do not use a blade to shave the surgical area 1 week before surgery. It is okay to use a clean electric clippers up to 24 hours before surgery. Do not apply any lotions, creams, including makeup, cologne, deodorant, or perfumes after showering on the day of your surgery. Do not use dry  shampoo, hair spray, hair gel, or any type of hair products.     No contact lenses, eye make-up, or artificial eyelashes. Remove nail polish, including gel polish, and any artificial, gel, or acrylic nails if possible. Remove all jewelry including rings and body piercing jewelry.     Wear causal clothing that is easy to take on and off. Consider your type of surgery.    Keep any valuables, jewelry, piercings at home. Please bring any specially ordered equipment (sling, braces) if indicated.    Arrange for a responsible person to drive you to and from the hospital on the day of your surgery. Please confirm the visitor policy for the day of your procedure when you receive your phone call with an arrival time.     Call the surgeon's office with any new illnesses, exposures, or additional questions prior to surgery.    Please reference your “My Surgical Experience Booklet” for additional information to prepare for your upcoming surgery.

## 2024-05-07 ENCOUNTER — ANESTHESIA (OUTPATIENT)
Dept: PERIOP | Facility: AMBULARY SURGERY CENTER | Age: 69
End: 2024-05-07
Payer: MEDICARE

## 2024-05-21 ENCOUNTER — ANESTHESIA (OUTPATIENT)
Dept: PERIOP | Facility: AMBULARY SURGERY CENTER | Age: 69
End: 2024-05-21
Payer: MEDICARE

## 2024-06-04 ENCOUNTER — APPOINTMENT (OUTPATIENT)
Dept: RADIOLOGY | Facility: AMBULARY SURGERY CENTER | Age: 69
End: 2024-06-04
Payer: MEDICARE

## 2024-06-04 ENCOUNTER — NURSE TRIAGE (OUTPATIENT)
Dept: OTHER | Facility: OTHER | Age: 69
End: 2024-06-04

## 2024-06-04 ENCOUNTER — HOSPITAL ENCOUNTER (OUTPATIENT)
Facility: AMBULARY SURGERY CENTER | Age: 69
Setting detail: OUTPATIENT SURGERY
Discharge: HOME | End: 2024-06-04
Attending: COLON & RECTAL SURGERY | Admitting: COLON & RECTAL SURGERY
Payer: MEDICARE

## 2024-06-04 ENCOUNTER — TELEPHONE (OUTPATIENT)
Age: 69
End: 2024-06-04

## 2024-06-04 VITALS
SYSTOLIC BLOOD PRESSURE: 119 MMHG | OXYGEN SATURATION: 96 % | TEMPERATURE: 97.7 F | HEART RATE: 71 BPM | DIASTOLIC BLOOD PRESSURE: 59 MMHG | RESPIRATION RATE: 18 BRPM

## 2024-06-04 DIAGNOSIS — N39.46 MIXED STRESS AND URGE URINARY INCONTINENCE: ICD-10-CM

## 2024-06-04 DIAGNOSIS — R15.9 INCONTINENCE OF FECES, UNSPECIFIED FECAL INCONTINENCE TYPE: Primary | ICD-10-CM

## 2024-06-04 PROCEDURE — C1778 LEAD, NEUROSTIMULATOR: HCPCS | Performed by: COLON & RECTAL SURGERY

## 2024-06-04 PROCEDURE — NC001 PR NO CHARGE: Performed by: COLON & RECTAL SURGERY

## 2024-06-04 PROCEDURE — C1883 ADAPT/EXT, PACING/NEURO LEAD: HCPCS | Performed by: COLON & RECTAL SURGERY

## 2024-06-04 PROCEDURE — 64561 IMPLANT NEUROELECTRODES: CPT | Performed by: COLON & RECTAL SURGERY

## 2024-06-04 PROCEDURE — 72220 X-RAY EXAM SACRUM TAILBONE: CPT

## 2024-06-04 PROCEDURE — C1787 PATIENT PROGR, NEUROSTIM: HCPCS | Performed by: COLON & RECTAL SURGERY

## 2024-06-04 DEVICE — LEAD TINED KIT 28CM SURESCAN: Type: IMPLANTABLE DEVICE | Site: SACRUM | Status: FUNCTIONAL

## 2024-06-04 DEVICE — LEAD EXT PERCUTANEOUS  F/INTERSTIM SURESCAN: Type: IMPLANTABLE DEVICE | Site: SACRUM | Status: FUNCTIONAL

## 2024-06-04 RX ORDER — FENTANYL CITRATE 50 UG/ML
INJECTION, SOLUTION INTRAMUSCULAR; INTRAVENOUS AS NEEDED
Status: DISCONTINUED | OUTPATIENT
Start: 2024-06-04 | End: 2024-06-04

## 2024-06-04 RX ORDER — CEFAZOLIN SODIUM 1 G/3ML
INJECTION, POWDER, FOR SOLUTION INTRAMUSCULAR; INTRAVENOUS AS NEEDED
Status: DISCONTINUED | OUTPATIENT
Start: 2024-06-04 | End: 2024-06-04

## 2024-06-04 RX ORDER — ONDANSETRON 2 MG/ML
INJECTION INTRAMUSCULAR; INTRAVENOUS AS NEEDED
Status: DISCONTINUED | OUTPATIENT
Start: 2024-06-04 | End: 2024-06-04

## 2024-06-04 RX ORDER — BUPIVACAINE HYDROCHLORIDE 2.5 MG/ML
INJECTION, SOLUTION EPIDURAL; INFILTRATION; INTRACAUDAL AS NEEDED
Status: DISCONTINUED | OUTPATIENT
Start: 2024-06-04 | End: 2024-06-04 | Stop reason: HOSPADM

## 2024-06-04 RX ORDER — OXYCODONE HYDROCHLORIDE AND ACETAMINOPHEN 5; 325 MG/1; MG/1
1 TABLET ORAL EVERY 4 HOURS PRN
Qty: 20 TABLET | Refills: 0 | Status: SHIPPED | OUTPATIENT
Start: 2024-06-04 | End: 2024-06-09

## 2024-06-04 RX ORDER — GLYCOPYRROLATE 0.2 MG/ML
INJECTION INTRAMUSCULAR; INTRAVENOUS AS NEEDED
Status: DISCONTINUED | OUTPATIENT
Start: 2024-06-04 | End: 2024-06-04

## 2024-06-04 RX ORDER — HYDROCODONE BITARTRATE AND ACETAMINOPHEN 5; 325 MG/1; MG/1
1 TABLET ORAL EVERY 6 HOURS PRN
Status: DISCONTINUED | OUTPATIENT
Start: 2024-06-04 | End: 2024-06-04 | Stop reason: HOSPADM

## 2024-06-04 RX ORDER — FENTANYL CITRATE/PF 50 MCG/ML
25 SYRINGE (ML) INJECTION
Status: DISCONTINUED | OUTPATIENT
Start: 2024-06-04 | End: 2024-06-04 | Stop reason: HOSPADM

## 2024-06-04 RX ORDER — LIDOCAINE HYDROCHLORIDE 10 MG/ML
INJECTION, SOLUTION EPIDURAL; INFILTRATION; INTRACAUDAL; PERINEURAL AS NEEDED
Status: DISCONTINUED | OUTPATIENT
Start: 2024-06-04 | End: 2024-06-04 | Stop reason: HOSPADM

## 2024-06-04 RX ORDER — EPHEDRINE SULFATE 50 MG/ML
INJECTION INTRAVENOUS AS NEEDED
Status: DISCONTINUED | OUTPATIENT
Start: 2024-06-04 | End: 2024-06-04

## 2024-06-04 RX ORDER — PROPOFOL 10 MG/ML
INJECTION, EMULSION INTRAVENOUS CONTINUOUS PRN
Status: DISCONTINUED | OUTPATIENT
Start: 2024-06-04 | End: 2024-06-04

## 2024-06-04 RX ORDER — DEXAMETHASONE SODIUM PHOSPHATE 10 MG/ML
INJECTION, SOLUTION INTRAMUSCULAR; INTRAVENOUS AS NEEDED
Status: DISCONTINUED | OUTPATIENT
Start: 2024-06-04 | End: 2024-06-04

## 2024-06-04 RX ORDER — SODIUM CHLORIDE, SODIUM LACTATE, POTASSIUM CHLORIDE, CALCIUM CHLORIDE 600; 310; 30; 20 MG/100ML; MG/100ML; MG/100ML; MG/100ML
INJECTION, SOLUTION INTRAVENOUS CONTINUOUS PRN
Status: DISCONTINUED | OUTPATIENT
Start: 2024-06-04 | End: 2024-06-04

## 2024-06-04 RX ORDER — HYDROCODONE BITARTRATE AND ACETAMINOPHEN 5; 325 MG/1; MG/1
1 TABLET ORAL EVERY 6 HOURS PRN
Qty: 8 TABLET | Refills: 0 | Status: SHIPPED | OUTPATIENT
Start: 2024-06-04 | End: 2024-06-14

## 2024-06-04 RX ORDER — PROPOFOL 10 MG/ML
INJECTION, EMULSION INTRAVENOUS AS NEEDED
Status: DISCONTINUED | OUTPATIENT
Start: 2024-06-04 | End: 2024-06-04

## 2024-06-04 RX ORDER — MIDAZOLAM HYDROCHLORIDE 2 MG/2ML
INJECTION, SOLUTION INTRAMUSCULAR; INTRAVENOUS AS NEEDED
Status: DISCONTINUED | OUTPATIENT
Start: 2024-06-04 | End: 2024-06-04

## 2024-06-04 RX ADMIN — FENTANYL CITRATE 50 MCG: 50 INJECTION INTRAMUSCULAR; INTRAVENOUS at 14:25

## 2024-06-04 RX ADMIN — SODIUM CHLORIDE, SODIUM LACTATE, POTASSIUM CHLORIDE, AND CALCIUM CHLORIDE: .6; .31; .03; .02 INJECTION, SOLUTION INTRAVENOUS at 12:45

## 2024-06-04 RX ADMIN — DEXAMETHASONE SODIUM PHOSPHATE 4 MG: 10 INJECTION, SOLUTION INTRAMUSCULAR; INTRAVENOUS at 13:44

## 2024-06-04 RX ADMIN — PROPOFOL 10 MG: 10 INJECTION, EMULSION INTRAVENOUS at 13:40

## 2024-06-04 RX ADMIN — DEXMEDETOMIDINE 8 MCG: 100 INJECTION, SOLUTION INTRAVENOUS at 13:40

## 2024-06-04 RX ADMIN — FENTANYL CITRATE 25 MCG: 50 INJECTION INTRAMUSCULAR; INTRAVENOUS at 13:40

## 2024-06-04 RX ADMIN — PROPOFOL 50 MCG/KG/MIN: 10 INJECTION, EMULSION INTRAVENOUS at 13:40

## 2024-06-04 RX ADMIN — GLYCOPYRROLATE 0.1 MG: 0.2 INJECTION INTRAMUSCULAR; INTRAVENOUS at 13:31

## 2024-06-04 RX ADMIN — CEFAZOLIN 1000 MG: 1 INJECTION, POWDER, FOR SOLUTION INTRAMUSCULAR; INTRAVENOUS at 13:36

## 2024-06-04 RX ADMIN — EPHEDRINE SULFATE 5 MG: 50 INJECTION INTRAVENOUS at 13:49

## 2024-06-04 RX ADMIN — EPHEDRINE SULFATE 10 MG: 50 INJECTION INTRAVENOUS at 14:31

## 2024-06-04 RX ADMIN — ONDANSETRON 4 MG: 2 INJECTION INTRAMUSCULAR; INTRAVENOUS at 13:31

## 2024-06-04 RX ADMIN — EPHEDRINE SULFATE 15 MG: 50 INJECTION INTRAVENOUS at 13:57

## 2024-06-04 RX ADMIN — PROPOFOL 40 MG: 10 INJECTION, EMULSION INTRAVENOUS at 14:25

## 2024-06-04 RX ADMIN — MIDAZOLAM 2 MG: 1 INJECTION INTRAMUSCULAR; INTRAVENOUS at 13:31

## 2024-06-04 RX ADMIN — DEXMEDETOMIDINE 4 MCG: 100 INJECTION, SOLUTION INTRAVENOUS at 13:47

## 2024-06-04 RX ADMIN — DEXMEDETOMIDINE 8 MCG: 100 INJECTION, SOLUTION INTRAVENOUS at 13:42

## 2024-06-04 RX ADMIN — FENTANYL CITRATE 25 MCG: 50 INJECTION INTRAMUSCULAR; INTRAVENOUS at 14:04

## 2024-06-04 RX ADMIN — PROPOFOL 10 MG: 10 INJECTION, EMULSION INTRAVENOUS at 13:57

## 2024-06-04 RX ADMIN — EPHEDRINE SULFATE 10 MG: 50 INJECTION INTRAVENOUS at 14:05

## 2024-06-04 NOTE — ANESTHESIA POSTPROCEDURE EVALUATION
Post-Op Assessment Note    CV Status:  Stable    Pain management: adequate       Mental Status:  Alert and awake   Hydration Status:  Euvolemic   PONV Controlled:  Controlled   Airway Patency:  Patent     Post Op Vitals Reviewed: Yes      Staff: CRNA               /57 (06/04/24 1452)    Temp 97.8 °F (36.6 °C) (06/04/24 1452)    Pulse 58 (06/04/24 1452)   Resp 14 (06/04/24 1452)    SpO2 94 % (06/04/24 1452)

## 2024-06-04 NOTE — ANESTHESIA PREPROCEDURE EVALUATION
Procedure:  INSERTION SACRAL NERVE/BOWEL STIMULATOR STAGE I (Spine Lumbar)    Relevant Problems   CARDIO   (+) Mixed hyperlipidemia      ENDO   (+) Acquired hypothyroidism      MUSCULOSKELETAL   (+) Spondylosis of cervical region without myelopathy or radiculopathy      NEURO/PSYCH   (+) Seasonal depression (HCC)      Other   (+) Incontinence of feces        Physical Exam    Airway    Mallampati score: III  TM Distance: >3 FB  Neck ROM: full     Dental   No notable dental hx     Cardiovascular      Pulmonary      Other Findings  post-pubertal.      Anesthesia Plan  ASA Score- 2     Anesthesia Type- IV sedation with anesthesia with ASA Monitors.         Additional Monitors:     Airway Plan:            Plan Factors-    Chart reviewed.    Patient summary reviewed.    Patient is not a current smoker.              Induction- intravenous.    Postoperative Plan-         Informed Consent- Anesthetic plan and risks discussed with patient.  I personally reviewed this patient with the CRNA. Discussed and agreed on the Anesthesia Plan with the CRNA..

## 2024-06-04 NOTE — DISCHARGE INSTR - AVS FIRST PAGE
May sponge bathe or frontal shower beginning Sunday, leave plastic dressings on until curl up or fall off.  Call if any fevers, redness, increasing pain, drainage or other concerns.  Pain medication as prescribed for pain not to be combined with Tylenol.   Please keep bowel diary for any bowel accidents in the interval, we will see you at the 2-week follow-up OR for stage II..

## 2024-06-04 NOTE — OP NOTE
OPERATIVE REPORT  PATIENT NAME: Lainey Baldwin    :  1955  MRN: [id number]  Pt Location: [facility] OR ROOM 04    SURGERY DATE: 2024    Surgeons and Role:     * Hilario Rowley MD - Primary    Preop Diagnosis:  Incontinence of feces, unspecified fecal incontinence type [R15.9]    Post-Op Diagnosis Codes:     * Incontinence of feces, unspecified fecal incontinence type [R15.9]    Procedure(s):  -INSERTION SACRAL NERVE/BOWEL STIMULATOR STAGE I, insertion quadripolar tined lead  -Fluoroscopy for performance/interpretation of device placement    Specimen(s):  * No specimens in log *    Estimated Blood Loss:   Minimal    Drains:  * No LDAs found *    Anesthesia Type:   IV Sedation with Anesthesia    Operative Indications:  Incontinence of feces, unspecified fecal incontinence type [R15.9]    Operative Findings:  -Left sided S3 placement with toni ×4 and reflex ×4, tunneled to the right side for temporary testing with ipsilateral left sided pocket for eventual permanent generator.    Complications:   None    Procedure and Technique:  Lainey Baldwin is a 69 y.o. female who presents for sacral nerve stimulator, InterStim placement as prior discussion at office.  She continues with bowel/bladder incontinence despite workup and treatments tried.       We discussed sacral nerve stimulator, in a face-to-face, personal, informed consent process, the benefits, alternatives, risks including not limited to bleeding, failure of device, infection requiring removal of device, dislodged/fractured/retained device, battery changes among others, she understood these risks, signed informed consent, wished to proceed.     The patient was brought to the operating room. Venodynes were on and running throughout her case. She received cefazolin prior to skin incision. She was placed in the prone jackknife position with shoulders up in swimmer's view and padding to all joints and extremities. Prepped with ChloraPrep and after  appropriate drying time, was draped with Ioban sterile drapes. Timeout was taken per protocol, the procedure began with marking over the sacrum to france the sciatic notches with fluoroscopy.    With fluoroscopic views horizontal and vertical placements of marking needle on the skin performed until the foramen were identified. Both sides were cannulated with a finder needle, and confirmed on all views fluoroscopically, AP and lateral. Once they were confirmed and tested with adequate toni and toe, which were greater on the left side, this needle was then used for placement    The inner cannula was removed, guidewire was placed on fluoroscopic view, approximating the anterior table of the sacrum. An 11 blade was used to dilated the skin wire prior to the dilator, also placed with fluoroscopic views. Dilating cannula and guidewire were removed leaving only the guidewire in place. This was tested with good responses and all tunneling as well as wire placements, as well as the pocket placement were done after infiltration with 1% lidocaine/0.25% marcaine. A 15 blade was used to create a generator pocket on the ipsilateral side and bluntly dissecting after electrocautery through the subcutaneous tissues with a finger for a pocket for the loop of wire. Once this was placed, the extension cable was then tunneled with the same tunneling device as it had been to the pocket over to the right side and brought out avoiding the lead tip for attached external temporary box. The sterile extension was hooked to the leads after placing it in the boot showing all blue prior to tightening down the screws with audible click ×4 with the provided screwdriver. Hemostasis was assured.    All implant and pocket sites were irrigated and implant site was closed with a U stitch of 4-0 Monocryl avoiding the lead as well as the left-sided pocket with interrupted buried 3-0 Vicryl, avoiding the leads and closed with 4-0 Monocryl suture.     All  of these sites were then covered with Histoacryl glue followed by a Tegaderm. The external wire, once all dressings were placed was attached to a  temporary stimulator box and all of these were padded with 4 x 4's and placed against the patient's skin with good padding with Tegaderm. All sponge, needle and instrument counts were correct.    I was present and scrubbed for the entire procedure    Patient Disposition:  PACU         SIGNATURE: Hilario Rowley MD  DATE: June 4, 2024  TIME: 2:48 PM

## 2024-06-04 NOTE — TELEPHONE ENCOUNTER
"Reason for Disposition  • [1] Caller has URGENT medicine question about med that PCP or specialist prescribed AND [2] triager unable to answer question    Answer Assessment - Initial Assessment Questions  1. NAME of MEDICATION: \"What medicine are you calling about?\"      [place] 5325  2. QUESTION: \"What is your question?\" (e.g., medication refill, side effect)      Pharmacy says its not made any longer ? Needs generic?  3. PRESCRIBING HCP: \"Who prescribed it?\" Reason: if prescribed by specialist, call should be referred to that group.      Dr Rowley  4. SYMPTOMS: \"Do you have any symptoms?\"      Post op pain  5. SEVERITY: If symptoms are present, ask \"Are they mild, moderate or severe?\"      Surgery this morning. Post op pain    Protocols used: Medication Question Call-ADULT-[facility]    "

## 2024-06-04 NOTE — TELEPHONE ENCOUNTER
Patients GI provider:  Dr. Rowley    Number to return call: [phone]    Reason for call: Pt's phamacy calling; there is an issue with the Norco script we sent in today, she says it was sent as a ALEXIS but that has been discontinued and needs to be reentered under the generic name in order for them to fill

## 2024-06-04 NOTE — H&P (VIEW-ONLY)
History and Physical   Colon and Rectal Surgery   Lainey Baldwin 69 y.o. female MRN: [id number]  Unit/Bed#: [facility] Encounter: [id number]  06/04/24   1:07 PM              ASSESSMENT:  Lainey Baldwin is a 69 y.o. female who presents for sacral nerve stimulator, InterStim placement as prior discussion at office.  She continues with bowel/bladder incontinence despite workup and treatments tried.      We discussed sacral nerve stimulator, in a face-to-face, personal, informed consent process, the benefits, alternatives, risks including not limited to bleeding, failure of device, infection requiring removal of device, dislodged/fractured/retained device, battery changes among others.    PLAN:  Sacral nerve stimulation, Stage I    No chief complaint on file.        History of Present Illness   HPI:  Lainey Baldwin is a 69 y.o. female who presents for screening colonoscopy.  Denies nausea/vomiting/abdominal pain, change in bowel habits, rectal bleeding, or other constitutional symptoms.       Historical Information   Past Medical History:   Diagnosis Date    Age-related osteoporosis without current pathological fracture  9/9/2021    Chronic low back pain 2/6/2020    Disease of thyroid gland     Ectopic pregnancy 1984    Encounter for annual routine gynecological examination 5/30/2019    Encounter for postoperative care related to surgical joint fusion 10/28/2021    Encounter for screening mammogram for malignant neoplasm of breast 9/9/2021    High cholesterol     Hypothyroidism     Mid back pain 9/14/2021    Migraine     During perimenopause, not a problem now    Miscarriage     ectopic 1984, miscarriage 1992    Osteopenia     Peroneal tendonitis     Vaginal atrophy 5/30/2019    Varicella     Date unknown     Past Surgical History:   Procedure Laterality Date    COLON MANOMETRY      COLONOSCOPY      DENTAL SURGERY      removal of broken tooth and implant to be done    DILATION AND CURETTAGE OF UTERUS      ECTOPIC  PREGNANCY SURGERY      NASAL SEPTUM SURGERY      NM ARTHRODESIS COMBINED TQ 1NTRSPC LUMBAR N/A 6/7/2021    Procedure: Right L4-5 transverse lumbar interbody fusion and decompression;  Surgeon: Hilario Trevizo MD;  Location: [facility];  Service: Neurosurgery    REFRACTIVE SURGERY      VAGINAL DELIVERY         Meds/Allergies       Medications Prior to Admission:     atorvastatin (LIPITOR) 10 mg tablet    Calcium 200 MG TABS    cycloSPORINE (RESTASIS) 0.05 % ophthalmic emulsion    estradiol (ESTRACE VAGINAL) 0.1 mg/g vaginal cream    levothyroxine 100 mcg tablet    multivitamin (THERAGRAN) TABS    Omega-3 Fatty Acids (FISH OIL PO)    Vitamin D, Cholecalciferol, 1000 units CAPS    Estradiol (Estring) 7.5 MCG/24HR RING    No current facility-administered medications for this encounter.    Facility-Administered Medications Ordered in Other Encounters:     lactated ringers infusion, , Intravenous, Continuous PRN, Jayda Ryan CRNA, New Bag at 06/04/24 1245    Allergies   Allergen Reactions    Meloxicam Other (See Comments)     Gets very bad headache and upset stomach         Social History   Social History     Substance and Sexual Activity   Alcohol Use Not Currently    Comment: Less than one per month     Social History     Substance and Sexual Activity   Drug Use Never     Social History     Tobacco Use   Smoking Status Never   Smokeless Tobacco Never         Family History:   Family History   Problem Relation Age of Onset    Lymphoma Mother     Cancer Mother         Lymphoma    Hypertension Mother     Migraines Mother     Ankylosing spondylitis Father     Arthritis Father     Lung cancer Father     No Known Problems Sister     Diabetes Paternal Grandmother     Heart disease Paternal Grandfather     Hypertension Paternal Grandfather     Breast cancer Paternal Aunt         unknown age    Crohn's disease Cousin          Objective     Current Vitals:   Blood Pressure: 114/72 (06/04/24 1244)  Pulse: 59 (06/04/24  1244)  Temperature: (!) 97 °F (36.1 °C) (06/04/24 1244)  Temp Source: Temporal (06/04/24 1244)  Respirations: 18 (06/04/24 1244)  SpO2: 98 % (06/04/24 1244)  No intake or output data in the 24 hours ending 06/04/24 1307    Physical Exam:  General:no distress  Neck:supple  Pulm:no increased work of breathing, clear bilateral  CV:sinus  Abdomen:soft,nontender  Extremities:no edema

## 2024-06-04 NOTE — H&P
History and Physical   Colon and Rectal Surgery   Lainey Baldwin 69 y.o. female MRN: [id number]  Unit/Bed#: [facility] Encounter: [id number]  06/04/24   1:07 PM              ASSESSMENT:  Lainey Baldwin is a 69 y.o. female who presents for sacral nerve stimulator, InterStim placement as prior discussion at office.  She continues with bowel/bladder incontinence despite workup and treatments tried.      We discussed sacral nerve stimulator, in a face-to-face, personal, informed consent process, the benefits, alternatives, risks including not limited to bleeding, failure of device, infection requiring removal of device, dislodged/fractured/retained device, battery changes among others.    PLAN:  Sacral nerve stimulation, Stage I    No chief complaint on file.        History of Present Illness   HPI:  Lainey Baldwin is a 69 y.o. female who presents for screening colonoscopy.  Denies nausea/vomiting/abdominal pain, change in bowel habits, rectal bleeding, or other constitutional symptoms.       Historical Information   Past Medical History:   Diagnosis Date    Age-related osteoporosis without current pathological fracture  9/9/2021    Chronic low back pain 2/6/2020    Disease of thyroid gland     Ectopic pregnancy 1984    Encounter for annual routine gynecological examination 5/30/2019    Encounter for postoperative care related to surgical joint fusion 10/28/2021    Encounter for screening mammogram for malignant neoplasm of breast 9/9/2021    High cholesterol     Hypothyroidism     Mid back pain 9/14/2021    Migraine     During perimenopause, not a problem now    Miscarriage     ectopic 1984, miscarriage 1992    Osteopenia     Peroneal tendonitis     Vaginal atrophy 5/30/2019    Varicella     Date unknown     Past Surgical History:   Procedure Laterality Date    COLON MANOMETRY      COLONOSCOPY      DENTAL SURGERY      removal of broken tooth and implant to be done    DILATION AND CURETTAGE OF UTERUS      ECTOPIC  PREGNANCY SURGERY      NASAL SEPTUM SURGERY      MS ARTHRODESIS COMBINED TQ 1NTRSPC LUMBAR N/A 6/7/2021    Procedure: Right L4-5 transverse lumbar interbody fusion and decompression;  Surgeon: Hilario Trevizo MD;  [facility];  Service: Neurosurgery    REFRACTIVE SURGERY      VAGINAL DELIVERY         Meds/Allergies       Medications Prior to Admission:     atorvastatin (LIPITOR) 10 mg tablet    Calcium 200 MG TABS    cycloSPORINE (RESTASIS) 0.05 % ophthalmic emulsion    estradiol (ESTRACE VAGINAL) 0.1 mg/g vaginal cream    levothyroxine 100 mcg tablet    multivitamin (THERAGRAN) TABS    Omega-3 Fatty Acids (FISH OIL PO)    Vitamin D, Cholecalciferol, 1000 units CAPS    Estradiol (Estring) 7.5 MCG/24HR RING    No current facility-administered medications for this encounter.    Facility-Administered Medications Ordered in Other Encounters:     lactated ringers infusion, , Intravenous, Continuous PRN, Jayda Ryan CRNA, New Bag at 06/04/24 1245    Allergies   Allergen Reactions    Meloxicam Other (See Comments)     Gets very bad headache and upset stomach         Social History   Social History     Substance and Sexual Activity   Alcohol Use Not Currently    Comment: Less than one per month     Social History     Substance and Sexual Activity   Drug Use Never     Social History     Tobacco Use   Smoking Status Never   Smokeless Tobacco Never         Family History:   Family History   Problem Relation Age of Onset    Lymphoma Mother     Cancer Mother         Lymphoma    Hypertension Mother     Migraines Mother     Ankylosing spondylitis Father     Arthritis Father     Lung cancer Father     No Known Problems Sister     Diabetes Paternal Grandmother     Heart disease Paternal Grandfather     Hypertension Paternal Grandfather     Breast cancer Paternal Aunt         unknown age    Crohn's disease Cousin          Objective     Current Vitals:   Blood Pressure: 114/72 (06/04/24 1244)  Pulse: 59 (06/04/24  1244)  Temperature: (!) 97 °F (36.1 °C) (06/04/24 1244)  Temp Source: Temporal (06/04/24 1244)  Respirations: 18 (06/04/24 1244)  SpO2: 98 % (06/04/24 1244)  No intake or output data in the 24 hours ending 06/04/24 1307    Physical Exam:  General:no distress  Neck:supple  Pulm:no increased work of breathing, clear bilateral  CV:sinus  Abdomen:soft,nontender  Extremities:no edema

## 2024-06-04 NOTE — TELEPHONE ENCOUNTER
"Regarding: medication issue  ----- Message from Kristyn CARMONA sent at 6/4/2024  7:14 PM EDT -----  \"I had surgery today and the medication I was prescribed is discontinued\"    "

## 2024-06-04 NOTE — TELEPHONE ENCOUNTER
TT to on call Dr Caicedo : Pt  had surgery today and was expecting [place] 5/325 at the [facility]. [facility] made her aware they would need the generic ordered due to Norco not on the market any longer.

## 2024-06-14 ENCOUNTER — TELEPHONE (OUTPATIENT)
Age: 69
End: 2024-06-14

## 2024-06-14 NOTE — TELEPHONE ENCOUNTER
Patients GI provider:  Dr. Rowley    Number to return call: [phone]    Reason for call: Pt would like a call back to go over procedure on 6/18/24.     Scheduled procedure/appointment date if applicable: Apt/procedure

## 2024-06-17 NOTE — ANESTHESIA PREPROCEDURE EVALUATION
Procedure:  INSERTION SACRAL NERVE/BOWEL STIMULATOR STAGE II (Spine Lumbar)    Relevant Problems   CARDIO   (+) Mixed hyperlipidemia      ENDO   (+) Acquired hypothyroidism      MUSCULOSKELETAL   (+) Spondylosis of cervical region without myelopathy or radiculopathy      NEURO/PSYCH   (+) Seasonal depression (HCC)        Physical Exam    Airway    Mallampati score: II  TM Distance: >3 FB  Neck ROM: full     Dental   No notable dental hx     Cardiovascular  Rhythm: regular, Rate: normal    Pulmonary   Breath sounds clear to auscultation    Other Findings  post-pubertal.      Anesthesia Plan  ASA Score- 2     Anesthesia Type- IV sedation with anesthesia with ASA Monitors.         Additional Monitors:     Airway Plan:            Plan Factors-    Chart reviewed.    Patient summary reviewed.    Patient is not a current smoker.              Induction- intravenous.    Postoperative Plan- Plan for postoperative opioid use.     Perioperative Resuscitation Plan - Level 1 - Full Code.       Informed Consent- Anesthetic plan and risks discussed with patient.  I personally reviewed this patient with the CRNA. Discussed and agreed on the Anesthesia Plan with the CRNA..

## 2024-06-18 ENCOUNTER — HOSPITAL ENCOUNTER (OUTPATIENT)
Facility: AMBULARY SURGERY CENTER | Age: 69
Setting detail: OUTPATIENT SURGERY
Discharge: HOME | End: 2024-06-18
Attending: COLON & RECTAL SURGERY | Admitting: COLON & RECTAL SURGERY
Payer: MEDICARE

## 2024-06-18 VITALS
HEART RATE: 66 BPM | OXYGEN SATURATION: 97 % | RESPIRATION RATE: 20 BRPM | DIASTOLIC BLOOD PRESSURE: 70 MMHG | SYSTOLIC BLOOD PRESSURE: 115 MMHG | TEMPERATURE: 97.1 F | HEIGHT: 65 IN | WEIGHT: 145 LBS | BODY MASS INDEX: 24.16 KG/M2

## 2024-06-18 PROCEDURE — C1787 PATIENT PROGR, NEUROSTIM: HCPCS | Performed by: COLON & RECTAL SURGERY

## 2024-06-18 PROCEDURE — 64590 INS/RPL PRPH SAC/GSTR NPG/R: CPT | Performed by: COLON & RECTAL SURGERY

## 2024-06-18 PROCEDURE — C1767 GENERATOR, NEURO NON-RECHARG: HCPCS | Performed by: COLON & RECTAL SURGERY

## 2024-06-18 DEVICE — NEUROSTIMULATOR INTERSTIM X NON-RECHARGEABLE: Type: IMPLANTABLE DEVICE | Site: SPINE LUMBAR | Status: FUNCTIONAL

## 2024-06-18 RX ORDER — DEXAMETHASONE SODIUM PHOSPHATE 10 MG/ML
INJECTION, SOLUTION INTRAMUSCULAR; INTRAVENOUS AS NEEDED
Status: DISCONTINUED | OUTPATIENT
Start: 2024-06-18 | End: 2024-06-18

## 2024-06-18 RX ORDER — SODIUM CHLORIDE, SODIUM LACTATE, POTASSIUM CHLORIDE, CALCIUM CHLORIDE 600; 310; 30; 20 MG/100ML; MG/100ML; MG/100ML; MG/100ML
75 INJECTION, SOLUTION INTRAVENOUS CONTINUOUS
Status: DISCONTINUED | OUTPATIENT
Start: 2024-06-18 | End: 2024-06-18 | Stop reason: HOSPADM

## 2024-06-18 RX ORDER — CEFAZOLIN SODIUM 1 G/50ML
1000 SOLUTION INTRAVENOUS ONCE
Status: COMPLETED | OUTPATIENT
Start: 2024-06-18 | End: 2024-06-18

## 2024-06-18 RX ORDER — ONDANSETRON 2 MG/ML
INJECTION INTRAMUSCULAR; INTRAVENOUS AS NEEDED
Status: DISCONTINUED | OUTPATIENT
Start: 2024-06-18 | End: 2024-06-18

## 2024-06-18 RX ORDER — PROPOFOL 10 MG/ML
INJECTION, EMULSION INTRAVENOUS AS NEEDED
Status: DISCONTINUED | OUTPATIENT
Start: 2024-06-18 | End: 2024-06-18

## 2024-06-18 RX ORDER — MIDAZOLAM HYDROCHLORIDE 2 MG/2ML
INJECTION, SOLUTION INTRAMUSCULAR; INTRAVENOUS AS NEEDED
Status: DISCONTINUED | OUTPATIENT
Start: 2024-06-18 | End: 2024-06-18

## 2024-06-18 RX ORDER — DIPHENHYDRAMINE HYDROCHLORIDE 50 MG/ML
12.5 INJECTION INTRAMUSCULAR; INTRAVENOUS ONCE AS NEEDED
Status: DISCONTINUED | OUTPATIENT
Start: 2024-06-18 | End: 2024-06-18 | Stop reason: HOSPADM

## 2024-06-18 RX ORDER — FENTANYL CITRATE/PF 50 MCG/ML
25 SYRINGE (ML) INJECTION
Status: DISCONTINUED | OUTPATIENT
Start: 2024-06-18 | End: 2024-06-18 | Stop reason: HOSPADM

## 2024-06-18 RX ORDER — EPHEDRINE SULFATE 50 MG/ML
INJECTION INTRAVENOUS AS NEEDED
Status: DISCONTINUED | OUTPATIENT
Start: 2024-06-18 | End: 2024-06-18

## 2024-06-18 RX ORDER — ONDANSETRON 2 MG/ML
4 INJECTION INTRAMUSCULAR; INTRAVENOUS EVERY 4 HOURS PRN
Status: DISCONTINUED | OUTPATIENT
Start: 2024-06-18 | End: 2024-06-18 | Stop reason: HOSPADM

## 2024-06-18 RX ORDER — GLYCOPYRROLATE 0.2 MG/ML
INJECTION INTRAMUSCULAR; INTRAVENOUS AS NEEDED
Status: DISCONTINUED | OUTPATIENT
Start: 2024-06-18 | End: 2024-06-18

## 2024-06-18 RX ORDER — FENTANYL CITRATE 50 UG/ML
INJECTION, SOLUTION INTRAMUSCULAR; INTRAVENOUS AS NEEDED
Status: DISCONTINUED | OUTPATIENT
Start: 2024-06-18 | End: 2024-06-18

## 2024-06-18 RX ORDER — PROPOFOL 10 MG/ML
INJECTION, EMULSION INTRAVENOUS CONTINUOUS PRN
Status: DISCONTINUED | OUTPATIENT
Start: 2024-06-18 | End: 2024-06-18

## 2024-06-18 RX ORDER — HYDROMORPHONE HCL/PF 1 MG/ML
0.5 SYRINGE (ML) INJECTION
Status: DISCONTINUED | OUTPATIENT
Start: 2024-06-18 | End: 2024-06-18 | Stop reason: HOSPADM

## 2024-06-18 RX ORDER — SODIUM CHLORIDE, SODIUM LACTATE, POTASSIUM CHLORIDE, CALCIUM CHLORIDE 600; 310; 30; 20 MG/100ML; MG/100ML; MG/100ML; MG/100ML
INJECTION, SOLUTION INTRAVENOUS CONTINUOUS PRN
Status: DISCONTINUED | OUTPATIENT
Start: 2024-06-18 | End: 2024-06-18

## 2024-06-18 RX ORDER — LIDOCAINE HYDROCHLORIDE 10 MG/ML
INJECTION, SOLUTION EPIDURAL; INFILTRATION; INTRACAUDAL; PERINEURAL AS NEEDED
Status: DISCONTINUED | OUTPATIENT
Start: 2024-06-18 | End: 2024-06-18 | Stop reason: HOSPADM

## 2024-06-18 RX ADMIN — MIDAZOLAM 1 MG: 1 INJECTION INTRAMUSCULAR; INTRAVENOUS at 13:41

## 2024-06-18 RX ADMIN — ONDANSETRON 4 MG: 2 INJECTION INTRAMUSCULAR; INTRAVENOUS at 13:41

## 2024-06-18 RX ADMIN — PROPOFOL 70 MCG/KG/MIN: 10 INJECTION, EMULSION INTRAVENOUS at 13:46

## 2024-06-18 RX ADMIN — FENTANYL CITRATE 25 MCG: 50 INJECTION INTRAMUSCULAR; INTRAVENOUS at 13:45

## 2024-06-18 RX ADMIN — EPHEDRINE SULFATE 15 MG: 50 INJECTION INTRAVENOUS at 14:06

## 2024-06-18 RX ADMIN — DEXMEDETOMIDINE 8 MCG: 100 INJECTION, SOLUTION INTRAVENOUS at 13:47

## 2024-06-18 RX ADMIN — FENTANYL CITRATE 25 MCG: 50 INJECTION INTRAMUSCULAR; INTRAVENOUS at 13:50

## 2024-06-18 RX ADMIN — PROPOFOL 10 MG: 10 INJECTION, EMULSION INTRAVENOUS at 13:45

## 2024-06-18 RX ADMIN — DEXMEDETOMIDINE 4 MCG: 100 INJECTION, SOLUTION INTRAVENOUS at 13:50

## 2024-06-18 RX ADMIN — DEXAMETHASONE SODIUM PHOSPHATE 4 MG: 10 INJECTION, SOLUTION INTRAMUSCULAR; INTRAVENOUS at 13:47

## 2024-06-18 RX ADMIN — MIDAZOLAM 1 MG: 1 INJECTION INTRAMUSCULAR; INTRAVENOUS at 13:45

## 2024-06-18 RX ADMIN — SODIUM CHLORIDE, SODIUM LACTATE, POTASSIUM CHLORIDE, AND CALCIUM CHLORIDE: .6; .31; .03; .02 INJECTION, SOLUTION INTRAVENOUS at 11:22

## 2024-06-18 RX ADMIN — DEXMEDETOMIDINE 8 MCG: 100 INJECTION, SOLUTION INTRAVENOUS at 13:44

## 2024-06-18 RX ADMIN — CEFAZOLIN SODIUM 1000 MG: 1 SOLUTION INTRAVENOUS at 13:44

## 2024-06-18 RX ADMIN — EPHEDRINE SULFATE 10 MG: 50 INJECTION INTRAVENOUS at 13:57

## 2024-06-18 RX ADMIN — EPHEDRINE SULFATE 5 MG: 50 INJECTION INTRAVENOUS at 13:52

## 2024-06-18 RX ADMIN — GLYCOPYRROLATE 0.1 MG: 0.2 INJECTION INTRAMUSCULAR; INTRAVENOUS at 13:41

## 2024-06-18 RX ADMIN — EPHEDRINE SULFATE 10 MG: 50 INJECTION INTRAVENOUS at 14:18

## 2024-06-18 NOTE — ANESTHESIA POSTPROCEDURE EVALUATION
Post-Op Assessment Note    CV Status:  Stable  Pain Score: 0    Pain management: adequate       Mental Status:  Alert and awake   Hydration Status:  Stable   PONV Controlled:  None   Airway Patency:  Patent     Post Op Vitals Reviewed: Yes    No anethesia notable event occurred.    Staff: CRNA               BP   127/61   Temp   96.8   Pulse  55   Resp   16   SpO2   100

## 2024-06-18 NOTE — OP NOTE
OPERATIVE REPORT  PATIENT NAME: Lainey Baldwin    :  1955  MRN: [id number]  Pt [facility]    SURGERY DATE: 2024    Surgeons and Role:     * Hilario Rowley MD - Primary    Preop Diagnosis:  Incontinence of feces, unspecified fecal incontinence type [R15.9]    Post-Op Diagnosis Codes:     * Incontinence of feces, unspecified fecal incontinence type [R15.9]    Procedure(s):  INSERTION SACRAL NERVE/BOWEL STIMULATOR STAGE II  Generator implant  Generator interrogation    Specimen(s):  * No specimens in log *    Estimated Blood Loss:   Minimal    Drains:  * No LDAs found *    Anesthesia Type:   IV Sedation with Anesthesia    Operative Indications:  Incontinence of feces, unspecified fecal incontinence type [R15.9]    Operative Findings:  -Left sided generator implant site, all impedances within normal on interrogation.    Complications:   None    Procedure and Technique:  Lainey Baldwin is a 69 y.o. female who presents for sacral nerve stimulator, InterStim placement as prior discussion at office.  She continues with bowel/bladder incontinence despite workup and treatments tried.       We discussed sacral nerve stimulator, in a face-to-face, personal, informed consent process, the benefits, alternatives, risks including not limited to bleeding, failure of device, infection requiring removal of device, dislodged/fractured/retained device, battery changes among others, today returns after stage I lead implantation 2 weeks prior, On follow-up she has had much improvement with at least 50%+ improvement in symptoms/episodes.    We discussed the successful results and stage II generator implantation with all the same risks of the prior surgery reviewed. She understood these risks and wishes to proceed, signed informed consent and proceeded to the operating room.    She was placed in the prone position. Venodynes were on and running. She received cefazolin 1 g prior to skin incision. Monitored  anesthesia care was induced.    The previous left buttock implantation site was scrubbed clean with ChloraPrep ×2. After appropriate drying time this was covered with Ioban and sterile drapes. After a timeout was taken per protocol a 15 blade was used to enter the previous implantation site superficially to avoid the lead. The Vicryl sutures were cut. The wire and boot were brought out and the extension boot and wire were removed from the device and pulled off the field which had previously been prepped away from the drapes, cutting the temporary wire.    The pocket was irrigated and was cleaned and hemostatic. The lead was dried off and placed into the stage II generator battery until the leads were showing prior to tightening down the stay screws with an audible click. The battery was then rotated into place and flat with the wire behind without any kinks or loops.    Wound was irrigated after glove change, and then closed with interrupted 3-0 Vicryl ×3 and 4-0 Monocryl subcuticular stitch covered by Histoacryl glue and Tegaderm.    Following this, the impedance monitor was placed and all impedances were within normal. Please note that the wounds had been prior infiltrated with lidocaine for anesthetic. All sponge needle and instrument counts were correct. I was present for the entire procedure    Patient Disposition:  PACU         SIGNATURE: Hilario Rowley MD  DATE: June 18, 2024  TIME: 1:51 PM

## 2024-06-18 NOTE — DISCHARGE INSTR - AVS FIRST PAGE
May shower beginning Sunday, leave plastic dressings on until curl up or fall off.  Call if any fevers, redness, increasing pain, drainage or other concerns.  Pain medication as prescribed prior for pain not to be combined with Tylenol.   Office followup Dr. Hilario Rowley [phone], 6weeks.

## 2024-06-18 NOTE — INTERVAL H&P NOTE
She has had quite good result, at least 50% and better improvement in all episodes, Stage II today with above risks prior discussed.  H&P reviewed. After examining the patient I find no changes in the patients condition since the H&P had been written.    Vitals:    06/18/24 1135   BP: 111/73   Pulse: 63   Resp: 19   Temp: (!) 97.1 °F (36.2 °C)   SpO2: 98%

## 2024-07-23 ENCOUNTER — TELEPHONE (OUTPATIENT)
Dept: OBGYN CLINIC | Facility: CLINIC | Age: 69
End: 2024-07-23

## 2024-07-23 ENCOUNTER — TELEPHONE (OUTPATIENT)
Age: 69
End: 2024-07-23

## 2024-07-23 DIAGNOSIS — N95.2 VAGINAL ATROPHY: ICD-10-CM

## 2024-07-23 RX ORDER — ESTRADIOL 0.1 MG/G
1 CREAM VAGINAL 3 TIMES WEEKLY
Qty: 42.5 G | Refills: 1 | Status: SHIPPED | OUTPATIENT
Start: 2024-07-24 | End: 2025-02-07

## 2024-07-23 NOTE — TELEPHONE ENCOUNTER
Patient called for yearly appointment, only sees Dr. Villafana. Patient has medicare and is seen every 2 years. Last annual 12/05/22  Patient only wants to schedule with Dr. Villafana- unable to find appointment for her. Josias message sent to clerical team to follow up with patient.

## 2024-07-23 NOTE — TELEPHONE ENCOUNTER
Reason for call:   [x] Refill   [] Prior Auth  [] Other:     Office:   [] PCP/Provider -   [x] Specialty/Provider -         Does the patient have enough for 3 days?   [x] Yes   [] No - Send as HP to POD

## 2024-07-23 NOTE — TELEPHONE ENCOUNTER
Lmom for pt to call us back to schedule yearly. Dr. Villafana currently has no spots after 12/5/24 was going to offer pt a spot with another provider or ask if she would like to wait until schedules come out for jan.

## 2024-07-29 NOTE — PROGRESS NOTES
Colon and Rectal Surgery   Lainey Baldwin 69 y.o. female MRN: [id number]   Encounter: [id number]  24   10:00 AM    Ambulatory Visit  Name: Lainey Baldwin      : 1955      MRN: [id number]  Encounter Provider: Hilario Rowley MD  Encounter Date: 2024   Encounter department: [facility] COLON AND RECTAL SURGERY [place]    Assessment & Plan   1. Incontinence of feces, unspecified fecal incontinence type  Assessment & Plan:  Lainey is doing quite nicely, she is in excellent spirits and is quite happy with her InterStim results, much greater than 50% improvement by her report, and happy with the quality of life improvement.    On examination completely healed left lateral generator pocket site.  She feels the stimulation, no pain, episodes have minimized, she relates the occasional episode maybe to some of her prior IBS symptoms or decaf intake.    She will continue with fiber supplementation, she knows to call us if there is any stalling or worsening in her symptoms that we may offer reprogramming.    Otherwise she is in for a  health maintenance recall colonoscopy for history of polyps.       HPI  Lainey Baldwin is a 69 y.o. female who presents for a post operative evaluation.    The patient is status post insertion sacral nerve/bowel stimulator stage I on 24. Stage II on 24.    The patient reports improvement with her fecal incontinence. At most she is having stool leakage once a week, but states that this does not occur weekly.     She has daily bowel movements with soft and formed stool. She takes supplemental fiber twice a day.     She reports that she can feel the device under her skin but she is not having any problems with the surgical site.     Historical Information   Past Medical History:   Diagnosis Date    Age-related osteoporosis without current pathological fracture  2021    Chronic low back pain 2020    Disease of thyroid gland     Ectopic pregnancy      Encounter for annual routine gynecological examination 5/30/2019    Encounter for postoperative care related to surgical joint fusion 10/28/2021    Encounter for screening mammogram for malignant neoplasm of breast 9/9/2021    High cholesterol     Hypothyroidism     Mid back pain 9/14/2021    Migraine     During perimenopause, not a problem now    Miscarriage     ectopic 1984, miscarriage 1992    Osteopenia     Peroneal tendonitis     Vaginal atrophy 5/30/2019    Varicella     Date unknown     Past Surgical History:   Procedure Laterality Date    COLON MANOMETRY      COLONOSCOPY      DENTAL SURGERY      removal of broken tooth and implant to be done    DILATION AND CURETTAGE OF UTERUS      ECTOPIC PREGNANCY SURGERY      NASAL SEPTUM SURGERY      [place] ARTHRODESIS COMBINED TQ 1NTRSPC LUMBAR N/A 6/7/2021    Procedure: Right L4-5 transverse lumbar interbody fusion and decompression;  Surgeon: Hilario Trevizo MD;  [facility];  Service: Neurosurgery    [place] INS/RPLC PERPH SAC/GSTRC NPG/RCVR PCKT CRTJ&CONN N/A 6/18/2024    Procedure: INSERTION SACRAL NERVE/BOWEL STIMULATOR STAGE II;  Surgeon: Hilario Rowley MD;  [facility];  Service: Colorectal    [place] PRQ IMPLTJ NEUROSTIM ELTRD SACRAL NRVE W/IMAGING N/A 6/4/2024    Procedure: INSERTION SACRAL NERVE/BOWEL STIMULATOR STAGE I;  Surgeon: Hilario Rowley MD;  [facility];  Service: Colorectal    REFRACTIVE SURGERY      VAGINAL DELIVERY         Meds/Allergies       Current Outpatient Medications:     atorvastatin (LIPITOR) 10 mg tablet, Take 1 tablet (10 mg total) by mouth daily, Disp: 90 tablet, Rfl: 3    Calcium 200 MG TABS, Take by mouth daily, Disp: , Rfl:     celecoxib (CeleBREX) 200 mg capsule, Take 1 capsule by mouth daily, Disp: , Rfl:     cycloSPORINE (RESTASIS) 0.05 % ophthalmic emulsion, Administer 1 drop to both eyes every 12 (twelve) hours , Disp: , Rfl:     estradiol (ESTRACE VAGINAL) 0.1 mg/g vaginal cream, Insert 1 g into  "the vagina 3 (three) times a week, Disp: 42.5 g, Rfl: 1    levothyroxine 100 mcg tablet, Take 1 tablet (100 mcg total) by mouth daily, Disp: 90 tablet, Rfl: 3    multivitamin (THERAGRAN) TABS, Take 1 tablet by mouth daily, Disp: , Rfl:     Omega-3 Fatty Acids (FISH OIL PO), Take by mouth Every other day, Disp: , Rfl:     Estradiol (Estring) 7.5 MCG/24HR RING, Insert 1 Ring into the vagina every 3 (three) months, Disp: 1 each, Rfl: 3    Vitamin D, Cholecalciferol, 1000 units CAPS, Take 5,000 Units by mouth daily Patient will start taking in fall and winter., Disp: , Rfl:       Allergies   Allergen Reactions    Meloxicam Other (See Comments)     Gets very bad headache and upset stomach         Social History   Social History     Substance and Sexual Activity   Alcohol Use Not Currently    Comment: Less than one per month     Social History     Substance and Sexual Activity   Drug Use Never     Social History     Tobacco Use   Smoking Status Never   Smokeless Tobacco Never         Family History:   Family History   Problem Relation Age of Onset    Lymphoma Mother     Cancer Mother         Lymphoma    Hypertension Mother     Migraines Mother     Ankylosing spondylitis Father     Arthritis Father     Lung cancer Father     No Known Problems Sister     Diabetes Paternal Grandmother     Heart disease Paternal Grandfather     Hypertension Paternal Grandfather     Breast cancer Paternal Aunt         unknown age    Crohn's disease Cousin        Review of Systems    Objective     Current Vitals:   Vitals:    07/30/24 0938   BP: 98/62   Weight: 66.2 kg (146 lb)   Height: 5' 5\" (1.651 m)     Physical Exam:  General:no distress  Pulm:no increased work of breathing  Extremities:well healed left lateral generator pocket site        "

## 2024-07-30 ENCOUNTER — OFFICE VISIT (OUTPATIENT)
Age: 69
End: 2024-07-30

## 2024-07-30 VITALS
HEIGHT: 65 IN | WEIGHT: 146 LBS | BODY MASS INDEX: 24.32 KG/M2 | SYSTOLIC BLOOD PRESSURE: 98 MMHG | DIASTOLIC BLOOD PRESSURE: 62 MMHG

## 2024-07-30 DIAGNOSIS — R15.9 INCONTINENCE OF FECES, UNSPECIFIED FECAL INCONTINENCE TYPE: Primary | ICD-10-CM

## 2024-07-30 PROCEDURE — 99024 POSTOP FOLLOW-UP VISIT: CPT | Performed by: COLON & RECTAL SURGERY

## 2024-07-30 RX ORDER — CELECOXIB 200 MG/1
1 CAPSULE ORAL DAILY
COMMUNITY

## 2024-07-30 NOTE — ASSESSMENT & PLAN NOTE
Lainey is doing quite nicely, she is in excellent spirits and is quite happy with her InterStim results, much greater than 50% improvement by her report, and happy with the quality of life improvement.    On examination completely healed left lateral generator pocket site.  She feels the stimulation, no pain, episodes have minimized, she relates the occasional episode maybe to some of her prior IBS symptoms or decaf intake.    She will continue with fiber supplementation, she knows to call us if there is any stalling or worsening in her symptoms that we may offer reprogramming.    Otherwise she is in for a 2028 health maintenance recall colonoscopy for history of polyps.

## 2024-08-13 ENCOUNTER — TELEPHONE (OUTPATIENT)
Age: 69
End: 2024-08-13

## 2024-08-13 NOTE — TELEPHONE ENCOUNTER
Patient called to request MMR and Tdap vaccines. Per patient concerned she did not get these as a child, called medicare and immunizations are only covered if medically necessary per PCP. Last Tdap was in 2016, RN advised patient is covered until 2026. MMR has only received one dose in 2020. Please advise if patient should or should not get immunization.

## 2024-08-14 NOTE — TELEPHONE ENCOUNTER
Patient called for an update on the previous message.Patient stated she has an appt at the pharmacy today to receive the vaccines.Advised patient that the TDAP vaccine was given in 2016,not needed until 2026.

## 2024-08-28 ENCOUNTER — RA CDI HCC (OUTPATIENT)
Dept: OTHER | Facility: HOSPITAL | Age: 69
End: 2024-08-28

## 2024-09-12 ENCOUNTER — APPOINTMENT (OUTPATIENT)
Dept: LAB | Facility: CLINIC | Age: 69
End: 2024-09-12
Payer: MEDICARE

## 2024-09-12 DIAGNOSIS — E55.9 VITAMIN D DEFICIENCY: ICD-10-CM

## 2024-09-12 DIAGNOSIS — E78.2 MIXED HYPERLIPIDEMIA: ICD-10-CM

## 2024-09-12 LAB
25(OH)D3 SERPL-MCNC: 41.3 NG/ML (ref 30–100)
ALBUMIN SERPL BCG-MCNC: 4.2 G/DL (ref 3.5–5)
ALP SERPL-CCNC: 94 U/L (ref 34–104)
ALT SERPL W P-5'-P-CCNC: 24 U/L (ref 7–52)
ANION GAP SERPL CALCULATED.3IONS-SCNC: 5 MMOL/L (ref 4–13)
AST SERPL W P-5'-P-CCNC: 27 U/L (ref 13–39)
BASOPHILS # BLD AUTO: 0.04 THOUSANDS/ΜL (ref 0–0.1)
BASOPHILS NFR BLD AUTO: 1 % (ref 0–1)
BILIRUB SERPL-MCNC: 0.54 MG/DL (ref 0.2–1)
BUN SERPL-MCNC: 17 MG/DL (ref 5–25)
CALCIUM SERPL-MCNC: 9.3 MG/DL (ref 8.4–10.2)
CHLORIDE SERPL-SCNC: 101 MMOL/L (ref 96–108)
CHOLEST SERPL-MCNC: 190 MG/DL
CO2 SERPL-SCNC: 32 MMOL/L (ref 21–32)
CREAT SERPL-MCNC: 0.92 MG/DL (ref 0.6–1.3)
EOSINOPHIL # BLD AUTO: 0.14 THOUSAND/ΜL (ref 0–0.61)
EOSINOPHIL NFR BLD AUTO: 3 % (ref 0–6)
ERYTHROCYTE [DISTWIDTH] IN BLOOD BY AUTOMATED COUNT: 12.3 % (ref 11.6–15.1)
GFR SERPL CREATININE-BSD FRML MDRD: 63 ML/MIN/1.73SQ M
GLUCOSE P FAST SERPL-MCNC: 82 MG/DL (ref 65–99)
HCT VFR BLD AUTO: 40.1 % (ref 34.8–46.1)
HDLC SERPL-MCNC: 72 MG/DL
HGB BLD-MCNC: 13 G/DL (ref 11.5–15.4)
IMM GRANULOCYTES # BLD AUTO: 0.02 THOUSAND/UL (ref 0–0.2)
IMM GRANULOCYTES NFR BLD AUTO: 0 % (ref 0–2)
LDLC SERPL CALC-MCNC: 110 MG/DL (ref 0–100)
LYMPHOCYTES # BLD AUTO: 0.74 THOUSANDS/ΜL (ref 0.6–4.47)
LYMPHOCYTES NFR BLD AUTO: 16 % (ref 14–44)
MCH RBC QN AUTO: 29.2 PG (ref 26.8–34.3)
MCHC RBC AUTO-ENTMCNC: 32.4 G/DL (ref 31.4–37.4)
MCV RBC AUTO: 90 FL (ref 82–98)
MONOCYTES # BLD AUTO: 0.55 THOUSAND/ΜL (ref 0.17–1.22)
MONOCYTES NFR BLD AUTO: 12 % (ref 4–12)
NEUTROPHILS # BLD AUTO: 3.27 THOUSANDS/ΜL (ref 1.85–7.62)
NEUTS SEG NFR BLD AUTO: 68 % (ref 43–75)
NONHDLC SERPL-MCNC: 118 MG/DL
NRBC BLD AUTO-RTO: 0 /100 WBCS
PLATELET # BLD AUTO: 271 THOUSANDS/UL (ref 149–390)
PMV BLD AUTO: 10.1 FL (ref 8.9–12.7)
POTASSIUM SERPL-SCNC: 4.2 MMOL/L (ref 3.5–5.3)
PROT SERPL-MCNC: 7 G/DL (ref 6.4–8.4)
RBC # BLD AUTO: 4.45 MILLION/UL (ref 3.81–5.12)
SODIUM SERPL-SCNC: 138 MMOL/L (ref 135–147)
TRIGL SERPL-MCNC: 39 MG/DL
TSH SERPL DL<=0.05 MIU/L-ACNC: 0.59 UIU/ML (ref 0.45–4.5)
WBC # BLD AUTO: 4.76 THOUSAND/UL (ref 4.31–10.16)

## 2024-09-12 PROCEDURE — 36415 COLL VENOUS BLD VENIPUNCTURE: CPT

## 2024-09-12 PROCEDURE — 84443 ASSAY THYROID STIM HORMONE: CPT

## 2024-09-12 PROCEDURE — 82306 VITAMIN D 25 HYDROXY: CPT

## 2024-09-12 PROCEDURE — 80053 COMPREHEN METABOLIC PANEL: CPT

## 2024-09-12 PROCEDURE — 85025 COMPLETE CBC W/AUTO DIFF WBC: CPT

## 2024-09-12 PROCEDURE — 80061 LIPID PANEL: CPT

## 2024-09-16 ENCOUNTER — OFFICE VISIT (OUTPATIENT)
Dept: FAMILY MEDICINE CLINIC | Facility: MEDICAL CENTER | Age: 69
End: 2024-09-16
Payer: MEDICARE

## 2024-09-16 VITALS
RESPIRATION RATE: 16 BRPM | BODY MASS INDEX: 24.39 KG/M2 | SYSTOLIC BLOOD PRESSURE: 112 MMHG | TEMPERATURE: 98 F | OXYGEN SATURATION: 99 % | WEIGHT: 146.4 LBS | DIASTOLIC BLOOD PRESSURE: 72 MMHG | HEART RATE: 61 BPM | HEIGHT: 65 IN

## 2024-09-16 DIAGNOSIS — E03.9 ACQUIRED HYPOTHYROIDISM: ICD-10-CM

## 2024-09-16 DIAGNOSIS — E78.2 MIXED HYPERLIPIDEMIA: Primary | ICD-10-CM

## 2024-09-16 DIAGNOSIS — R15.9 INCONTINENCE OF FECES, UNSPECIFIED FECAL INCONTINENCE TYPE: ICD-10-CM

## 2024-09-16 DIAGNOSIS — K58.0 IRRITABLE BOWEL SYNDROME WITH DIARRHEA: ICD-10-CM

## 2024-09-16 DIAGNOSIS — E55.9 VITAMIN D DEFICIENCY: ICD-10-CM

## 2024-09-16 PROCEDURE — G2211 COMPLEX E/M VISIT ADD ON: HCPCS | Performed by: INTERNAL MEDICINE

## 2024-09-16 PROCEDURE — 99214 OFFICE O/P EST MOD 30 MIN: CPT | Performed by: INTERNAL MEDICINE

## 2024-09-16 NOTE — PROGRESS NOTES
INTERNAL MEDICINE OFFICE VISIT  [facility] [place] [address]  Tel: [phone]      NAME: Lainey Baldwin  AGE: 69 y.o.  SEX: female  : 1955   MRN: [id number]    DATE: 2024  TIME: 10:49 AM      Assessment and Plan:  1. Mixed hyperlipidemia  Continue atorvastatin  - CBC and differential; Future  - Comprehensive metabolic panel; Future  - Lipid panel; Future  - TSH, 3rd generation; Future    2. Acquired hypothyroidism  Continue levothyroxine at the same dose, check a TSH level    3. Incontinence of feces, unspecified fecal incontinence type  Had InterStim inserted by her colorectal surgeon and is feeling much better with improvement of symptoms    4. Irritable bowel syndrome with diarrhea  Stable, was told to avoid caffeine which makes her symptoms worse    5. Vitamin D deficiency  Continue vitamin D  - Vitamin D 25 hydroxy; Future      - Counseling Documentation: patient was counseled regarding: diagnostic results, instructions for management, risk factor reductions, prognosis, patient and family education, risks and benefits of treatment options, and importance of compliance with treatment  - Medication Side Effects: Adverse side effects of medications were reviewed with the patient/guardian today.      Return for follow up visit in 6 months or earlier, if needed.      Chief Complaint:  Chief Complaint   Patient presents with    Follow-up     6 month f/u with labs          History of Present Illness:   Patient is here to establish.  She has been taking her medication for the cholesterol and hypothyroidism and is doing well on it.  Has symptoms of IBS which are off-and-on.  She had incontinence of feces for which she got the InterStim inserted and is doing much better with her symptoms.      Active Problem List:  Patient Active Problem List   Diagnosis    Facet arthropathy, lumbar    Stenosis of lateral recess of lumbar spine     Spondylolisthesis    Lumbar radiculopathy    Spondylosis of cervical region without myelopathy or radiculopathy    Congenital deafness    Vitamin D deficiency    Osteopenia of multiple sites    Seasonal depression (HCC)    Acquired hypothyroidism    Mixed hyperlipidemia    Seasonal allergic rhinitis due to pollen    Irritable bowel syndrome with diarrhea    Incontinence of feces    Ganglion cyst    Neck pain    Mixed stress and urge urinary incontinence         Past Medical History:  Past Medical History:   Diagnosis Date    Age-related osteoporosis without current pathological fracture  09/09/2021    Allergic     Poison Ivy    Chronic low back pain 02/06/2020    Disease of thyroid gland     Ectopic pregnancy 1984    Encounter for annual routine gynecological examination 05/30/2019    Encounter for postoperative care related to surgical joint fusion 10/28/2021    Encounter for screening mammogram for malignant neoplasm of breast 09/09/2021    High cholesterol     HL (hearing loss) 1975    Hypothyroidism     Mid back pain 09/14/2021    Migraine     During perimenopause, not a problem now    Miscarriage     ectopic 1984, miscarriage 1992    Osteopenia     Peroneal tendonitis     Vaginal atrophy 05/30/2019    Varicella     Date unknown         Past Surgical History:  Past Surgical History:   Procedure Laterality Date    COLON MANOMETRY      COLONOSCOPY      DENTAL SURGERY      removal of broken tooth and implant to be done    DILATION AND CURETTAGE OF UTERUS      ECTOPIC PREGNANCY SURGERY      EYE SURGERY      Lasic    NASAL SEPTUM SURGERY      NV ARTHRODESIS COMBINED TQ 1NTRSPC LUMBAR N/A 06/07/2021    Procedure: Right L4-5 transverse lumbar interbody fusion and decompression;  Surgeon: Hilario Trevizo MD;  Location: [facility];  Service: Neurosurgery    NV INS/RPLC PERPH SAC/GSTRC NPG/RCVR PCKT CRTJ&CONN N/A 06/18/2024    Procedure: INSERTION SACRAL NERVE/BOWEL STIMULATOR STAGE II;  Surgeon: Hilario Rowley MD;   [facility];  Service: Colorectal    TX PRQ IMPLTJ NEUROSTIM ELTRD SACRAL NRVE W/IMAGING N/A 06/04/2024    Procedure: INSERTION SACRAL NERVE/BOWEL STIMULATOR STAGE I;  Surgeon: Hilario Rowley MD;  [facility];  Service: Colorectal    REFRACTIVE SURGERY      SPINE SURGERY  2021    L4-L5    VAGINAL DELIVERY           Family History:  Family History   Problem Relation Age of Onset    Lymphoma Mother     Cancer Mother         Lymphoma    Hypertension Mother     Migraines Mother     Ankylosing spondylitis Father     Arthritis Father     Lung cancer Father     No Known Problems Sister     Diabetes Paternal Grandmother     Heart disease Paternal Grandfather     Hypertension Paternal Grandfather     Breast cancer Paternal Aunt         unknown age    Crohn's disease Cousin          Social History:  Social History     Socioeconomic History    Marital status: /Civil Union     Spouse name: None    Number of children: None    Years of education: None    Highest education level: None   Occupational History    None   Tobacco Use    Smoking status: Never    Smokeless tobacco: Never   Vaping Use    Vaping status: Never Used   Substance and Sexual Activity    Alcohol use: Not Currently     Comment: Less than one per month    Drug use: Never    Sexual activity: Yes     Partners: Male     Birth control/protection: Post-menopausal     Comment: pt declines hiv std testing   Other Topics Concern    None   Social History Narrative    None     Social Determinants of Health     Financial Resource Strain: Low Risk  (1/23/2023)    Overall Financial Resource Strain (CARDIA)     Difficulty of Paying Living Expenses: Not hard at all   Food Insecurity: Not on file   Transportation Needs: No Transportation Needs (1/23/2023)    PRAPARE - Transportation     Lack of Transportation (Medical): No     Lack of Transportation (Non-Medical): No   Physical Activity: Sufficiently Active (1/23/2023)    Exercise Vital Sign      Days of Exercise per Week: 4 days     Minutes of Exercise per Session: 60 min   Stress: No Stress Concern Present (8/14/2023)    [employer] - Occupational Stress Questionnaire     Feeling of Stress : Not at all   Social Connections: Unknown (6/18/2024)    Received from [employer]     How often do you feel lonely or isolated from those around you? (Adult - for ages 18 years and over): Not on file   Intimate Partner Violence: Not on file   Housing Stability: Not on file         Allergies:  Allergies   Allergen Reactions    Meloxicam Other (See Comments)     Gets very bad headache and upset stomach    Poison Ivy Extract Itching         Medications:    Current Outpatient Medications:     atorvastatin (LIPITOR) 10 mg tablet, Take 1 tablet (10 mg total) by mouth daily, Disp: 90 tablet, Rfl: 3    Calcium 200 MG TABS, Take by mouth daily, Disp: , Rfl:     celecoxib (CeleBREX) 200 mg capsule, Take 1 capsule by mouth daily, Disp: , Rfl:     cycloSPORINE (RESTASIS) 0.05 % ophthalmic emulsion, Administer 1 drop to both eyes every 12 (twelve) hours , Disp: , Rfl:     levothyroxine 100 mcg tablet, Take 1 tablet (100 mcg total) by mouth daily, Disp: 90 tablet, Rfl: 3    multivitamin (THERAGRAN) TABS, Take 1 tablet by mouth daily, Disp: , Rfl:     Omega-3 Fatty Acids (FISH OIL PO), Take by mouth Every other day, Disp: , Rfl:     Vitamin D, Cholecalciferol, 1000 units CAPS, Take 5,000 Units by mouth daily Patient will start taking in fall and winter., Disp: , Rfl:       The following portions of the patient's history were reviewed and updated as appropriate: past medical history, past surgical history, family history, social history, allergies, current medications and active problem list.      Review of Systems:  Constitutional: Denies fever, chills, weight gain, weight loss, fatigue  Eyes: Denies eye redness, eye discharge, double vision, change in visual acuity  ENT: Denies  hearing loss, tinnitus, sneezing, nasal congestion, nasal discharge, sore throat   Respiratory: Denies cough, expectoration, hemoptysis, shortness of breath, wheezing  Cardiovascular: Denies chest pain, palpitations, lower extremity swelling, orthopnea, PND  Gastrointestinal: Denies abdominal pain, heartburn, nausea, vomiting, hematemesis, diarrhea, bloody stools  Genito-Urinary: Denies dysuria, frequency, difficulty in micturition, nocturia, incontinence  Musculoskeletal: Denies back pain, joint pain, muscle pain  Neurologic: Denies confusion, lightheadedness, syncope, headache, focal weakness, sensory changes, seizures  Endocrine: Denies polyuria, polydipsia, temperature intolerance  Allergy and Immunology: Denies hives, insect bite sensitivity  Hematological and Lymphatic: Denies bleeding problems, swollen glands   Psychological: Denies depression, suicidal ideation, anxiety, panic, mood swings  Dermatological: Denies pruritus, rash, skin lesion changes      Vitals:  Vitals:    09/16/24 1026   BP: 112/72   Pulse: 61   Resp: 16   Temp: 98 °F (36.7 °C)   SpO2: 99%       Body mass index is 24.36 kg/m².    Weight (last 2 days)       Date/Time Weight    09/16/24 1026 66.4 (146.4)              Physical Examination:  General: Patient is not in acute distress. Awake, alert, responding to commands. No weight gain or loss  Head: Normocephalic. Atraumatic  Eyes: Conjunctiva and lids with no swelling, erythema or discharge. Both pupils normal sized, round and reactive to light. Sclera nonicteric  ENT: External examination of nose and ear normal. Otoscopic examination shows translucent tympanic membranes with patent canals without erythema. Oropharynx moist with no erythema, edema, exudate or lesions  Neck: Supple. JVP not raised. Trachea midline. No masses. No thyromegaly  Lungs: No signs of increased work of breathing or respiratory distress. Bilateral bronchovascular breath sounds with no crackles or rhonchi  Chest wall:  "No tenderness  Cardiovascular: Normal PMI. No thrills. Regular rate and rhythm. S1 and S2 normal. No murmur, rub or gallop  Gastrointestinal: Abdomen soft, nontender. No guarding or rigidity. Liver and spleen not palpable. Bowel sounds present  Neurologic: Cranial nerves II-XII intact. Cortical functions normal. Motor system - Reflexes 2+ and symmetrical. Sensations normal  Musculoskeletal: Gait normal. No joint tenderness  Integumentary: Skin normal with no rash or lesions  Lymphatic: No palpable lymph nodes in neck, axilla or groin  Extremities: No clubbing, cyanosis, edema or varicosities  Psychological: Judgement and insight normal. Mood and affect normal      Laboratory Results:  CBC with diff:   Lab Results   Component Value Date    WBC 4.76 09/12/2024    RBC 4.45 09/12/2024    HGB 13.0 09/12/2024    HCT 40.1 09/12/2024    MCV 90 09/12/2024    MCH 29.2 09/12/2024    RDW 12.3 09/12/2024     09/12/2024       CMP:  Lab Results   Component Value Date    CREATININE 0.92 09/12/2024    CREATININE 0.89 03/14/2022    BUN 17 09/12/2024    BUN 18 03/14/2022    K 4.2 09/12/2024    K 5.1 03/22/2022     09/12/2024     03/14/2022    CO2 32 09/12/2024    CO2 28 03/14/2022    ALKPHOS 94 09/12/2024    ALKPHOS 114 03/14/2022    ALT 24 09/12/2024    ALT 18 03/14/2022    AST 27 09/12/2024    AST 21 03/14/2022       Lab Results   Component Value Date    HGBA1C 5.4 05/24/2021       No results found for: \"TROPONINI\", \"CKMB\", \"CKTOTAL\"    Lipid Profile:   No results found for: \"CHOL\"  Lab Results   Component Value Date    HDL 72 09/12/2024    HDL 73 01/29/2024     Lab Results   Component Value Date    LDLCALC 110 (H) 09/12/2024    LDLCALC 113 (H) 01/29/2024     Lab Results   Component Value Date    TRIG 39 09/12/2024    TRIG 46 01/29/2024       Imaging Results:  XR sacrum and coccyx  Narrative: C-ARM - XR SACRUM AND COCCYX    INDICATION: interstim stage 1. Procedure guidance.    TECHNIQUE: Fluoroscopic guidance " provided.    COMPARISON: None    FLUOROSCOPY TIME: 23.9 seconds    7 FLUOROSCOPIC IMAGES    FINDINGS:    Fluoroscopy provided for procedure guidance.    Osseous and soft tissue detail limited by technique.  Impression: Fluoroscopy provided for procedure guidance.    Please refer to the separate procedure note for additional details.    Workstation performed: [id number]       Health Maintenance:  Health Maintenance   Topic Date Due    RSV Vaccine Age 60+ Years (1 - 1-dose 60+ series) Never done    Breast Cancer Screening: Mammogram  12/20/2024    Fall Risk  02/01/2025    Urinary Incontinence Screening  02/01/2025    Medicare Annual Wellness Visit (AWV)  02/01/2025    Depression Screening  09/16/2025    Colorectal Cancer Screening  08/21/2028    Hepatitis C Screening  Completed    Osteoporosis Screening  Completed    Zoster Vaccine  Completed    Pneumococcal Vaccine: 65+ Years  Completed    Influenza Vaccine  Completed    COVID-19 Vaccine  Completed    RSV Vaccine age 0-20 Months  Aged Out    HIB Vaccine  Aged Out    IPV Vaccine  Aged Out    Hepatitis A Vaccine  Aged Out    Meningococcal ACWY Vaccine  Aged Out    HPV Vaccine  Aged Out     Immunization History   Administered Date(s) Administered    COVID-19 PFIZER VACCINE 0.3 ML IM 02/22/2021, 03/16/2021, 09/28/2021    COVID-19 Pfizer Vac BIVALENT James-sucrose 12 Yr+ IM 11/21/2022, 07/07/2023    COVID-19 Pfizer mRNA vacc PF james-sucrose 12 yr and older (Comirnaty) 11/16/2023, 09/11/2024    COVID-19 Pfizer vac (James-sucrose, gray cap) 12 yr+ IM 04/12/2022    H1N1 Inj 12/29/2009    H1N1, All Formulations 12/29/2009    INFLUENZA 10/02/2009, 09/29/2010, 09/16/2015, 09/20/2017, 09/26/2018, 09/26/2018, 08/01/2020, 09/13/2020, 08/11/2021, 08/19/2021, 09/16/2022    Influenza Injectable, MDCK, Preservative Free, Quadrivalent, 0.5 mL 09/27/2019    Influenza Quadrivalent Preservative Free 3 years and older IM 09/16/2015    Influenza Split 09/13/2013    Influenza Split High Dose  Preservative Free IM 08/01/2020, 09/11/2024    Influenza, injectable, quadrivalent, preservative free 0.5 mL 09/26/2018    Influenza, seasonal, injectable 10/02/2009, 09/29/2010    Influenza, seasonal, injectable, preservative free 09/14/2011, 10/06/2014    MMR 07/07/2020    Palivizumab (RSV-MAb) 09/07/2023    Pneumococcal 20-valent Conjugate (Historical) 01/25/2024    Pneumococcal Conjugate 13-Valent 09/13/2018    Pneumococcal Polysaccharide PPV23 10/12/2020    Tdap 01/25/2016    Tuberculin Skin Test-PPD Intradermal 08/02/2006, 09/26/2011, 03/23/2022    Zoster 08/04/2006, 03/27/2015, 04/08/2019    Zoster Vaccine Recombinant 12/26/2018, 01/09/2023, 03/13/2023    influenza, injectable, quadrivalent 09/26/2018    influenza, trivalent, adjuvanted 11/16/2023         Brandi Rogers MD  9/16/2024,10:49 AM

## 2024-11-22 DIAGNOSIS — E78.2 MIXED HYPERLIPIDEMIA: ICD-10-CM

## 2024-11-22 DIAGNOSIS — E03.9 ACQUIRED HYPOTHYROIDISM: ICD-10-CM

## 2024-11-22 NOTE — TELEPHONE ENCOUNTER
Reason for call:   [x] Refill   [] Prior Auth  [] Other:     Office:   [x] PCP/Provider - Ken  [] Specialty/Provider -     Medication: levothyroxine 100 mcg tablet 100 mcg, Oral, Daily      atorvastatin (LIPITOR) 10 mg tablet 10 mg, Oral, Daily      Quantity: 90    Pharmacy: [id number] - [place] - [phone]    Does the patient have enough for 3 days?   [x] Yes   [] No - Send as HP to POD

## 2024-11-26 RX ORDER — LEVOTHYROXINE SODIUM 100 UG/1
100 TABLET ORAL DAILY
Qty: 90 TABLET | Refills: 0 | Status: SHIPPED | OUTPATIENT
Start: 2024-11-26

## 2024-11-26 RX ORDER — ATORVASTATIN CALCIUM 10 MG/1
10 TABLET, FILM COATED ORAL DAILY
Qty: 90 TABLET | Refills: 0 | Status: SHIPPED | OUTPATIENT
Start: 2024-11-26

## 2024-12-04 ENCOUNTER — TELEPHONE (OUTPATIENT)
Age: 69
End: 2024-12-04

## 2025-01-02 ENCOUNTER — TELEPHONE (OUTPATIENT)
Dept: OBGYN CLINIC | Facility: CLINIC | Age: 70
End: 2025-01-02

## 2025-01-02 NOTE — TELEPHONE ENCOUNTER
Patient asking for medication not on current med list.   Does not want to use cream, having some hot flashes and night sweats because she is not using anything    Requesting Estring  [facility] [address] [place] [address]

## 2025-01-03 DIAGNOSIS — N95.2 VAGINAL ATROPHY: Primary | ICD-10-CM

## 2025-01-03 DIAGNOSIS — N95.1 MENOPAUSAL SYMPTOMS: ICD-10-CM

## 2025-01-03 RX ORDER — ESTRADIOL 2 MG/1
1 RING VAGINAL
Qty: 1 EACH | Refills: 4 | Status: SHIPPED | OUTPATIENT
Start: 2025-01-03

## 2025-01-10 ENCOUNTER — OFFICE VISIT (OUTPATIENT)
Dept: NEUROSURGERY | Facility: CLINIC | Age: 70
End: 2025-01-10
Payer: MEDICARE

## 2025-01-10 VITALS
HEART RATE: 60 BPM | BODY MASS INDEX: 24.16 KG/M2 | SYSTOLIC BLOOD PRESSURE: 119 MMHG | TEMPERATURE: 97.9 F | OXYGEN SATURATION: 99 % | DIASTOLIC BLOOD PRESSURE: 73 MMHG | HEIGHT: 65 IN | WEIGHT: 145 LBS

## 2025-01-10 DIAGNOSIS — M54.50 ACUTE BILATERAL LOW BACK PAIN WITHOUT SCIATICA: ICD-10-CM

## 2025-01-10 DIAGNOSIS — M54.50 LOWER BACK PAIN: Primary | ICD-10-CM

## 2025-01-10 PROCEDURE — 99204 OFFICE O/P NEW MOD 45 MIN: CPT | Performed by: NURSE PRACTITIONER

## 2025-01-10 NOTE — ASSESSMENT & PLAN NOTE
Patient seen in outpatient office today as a self-referral for further workup and evaluation of low back pain.  History of right L4-5 TLIF in June 2021 by   Patient states she was doing well until roughly 2-3 months ago when she lifted something no more than 40 pounds and started to develop left low back pain which lasted a few days that she started to notice pain on the right side this again only lasted for a few days and resolved.  She is complaining of intermittent numbness in her 3 middle toes on right.  Patient here requesting a PT referral    No new imaging to review    Plan:  Placed referral to physical therapy.  Continue to stay active  Patient will follow-up as needed or if symptoms worsen  Patient made aware to seek care sooner if she develops any new or worsening neurological change or red flag signs  Patient made aware to contact neurosurgery with any further question or concerns.

## 2025-01-10 NOTE — PROGRESS NOTES
Name: Lainey Baldwin      : 1955      MRN: [id number]  Encounter Provider: ROME Reina  Encounter Date: 1/10/2025   Encounter department: [facility] [place]  :  Assessment & Plan  Lower back pain    Orders:    Ambulatory Referral to Physical Therapy; Future    Acute bilateral low back pain without sciatica  Patient seen in outpatient office today as a self-referral for further workup and evaluation of low back pain.  History of right L4-5 TLIF in 2021 by   Patient states she was doing well until roughly 2-3 months ago when she lifted something no more than 40 pounds and started to develop left low back pain which lasted a few days that she started to notice pain on the right side this again only lasted for a few days and resolved.  She is complaining of intermittent numbness in her 3 middle toes on right.  Patient here requesting a PT referral    No new imaging to review    Plan:  Placed referral to physical therapy.  Continue to stay active  Patient will follow-up as needed or if symptoms worsen  Patient made aware to seek care sooner if she develops any new or worsening neurological change or red flag signs  Patient made aware to contact neurosurgery with any further question or concerns.             History of Present Illness     Lainey Baldwin is a 69 yo female with past medical history significant for deafness, osteopenia, hypothyroidism, hyperlipidemia, seasonal allergies, IBS, fecal incontinence with sacral nerve stimulator, stress and urge urinary incontinence, and status post right L4-5 TLIF.  Patient seen in outpatient office today as a self-referral for further workup and evaluation of low back pain.    Patient states she was doing well until about 2-3 months ago when she lifted something that was no more than 40 pounds and started to develop some left low back pain which lasted for few days then she noticed pain on the right side of her low  "back without radiation which resloved.  She also is complaining of numbness in her 3 middle toes which happened prior to her initial surgery but they remain in her right toes intermittently.  She denies recent falls or traumas or difficulty with her balance.  She is requesting a physical therapy referral.    Patient continues to stay very active and walks about 6 days a week and works out 3 days a week and exercises it has become more difficult because of the cold weather and dealing with her 's ailments.    Patient is questioning of vibration table that you stand on I am very unfamiliar with this.  She reports ongoing stool incontinence and urinary urgency no other new symptoms.    Patient currently denies any back pain.  Exam nonfocal.  Placed referral to physical therapy.  Patient will follow-up as needed if symptoms worsen.  Will try and assist patient in following up with physical therapist who specializes in low back pain and pelvic floor therapies.      HPI  Review of Systems   Constitutional: Negative.    HENT: Negative.     Eyes: Negative.    Respiratory: Negative.     Cardiovascular: Negative.    Gastrointestinal: Negative.    Endocrine: Negative.    Genitourinary: Negative.    Musculoskeletal:  Positive for back pain.        Lumbar Work-up     (B) Lbp L>R, non-radiating  + N on (R) foot in the middle 3 toes    Patient would like a PT referral placed  No AC/ non smoker  Med: Celebrex     Skin: Negative.    Allergic/Immunologic: Negative.    Neurological: Negative.    Hematological: Negative.    Psychiatric/Behavioral: Negative.     All other systems reviewed and are negative.    ROS reviewed with patient and agree and changes are made as needed    Objective     /73   Pulse 60   Temp 97.9 °F (36.6 °C) (Temporal)   Ht 5' 5\" (1.651 m)   Wt 65.8 kg (145 lb)   SpO2 99%   BMI 24.13 kg/m²     Physical Exam  Vitals reviewed.   Constitutional:       General: She is not in acute distress.     " Appearance: Normal appearance. She is not ill-appearing.   HENT:      Head: Normocephalic and atraumatic.   Eyes:      Extraocular Movements: Extraocular movements intact.      Conjunctiva/sclera: Conjunctivae normal.      Pupils: Pupils are equal, round, and reactive to light.   Cardiovascular:      Rate and Rhythm: Normal rate.   Pulmonary:      Effort: Pulmonary effort is normal. No respiratory distress.   Chest:      Chest wall: No tenderness.   Abdominal:      General: There is no distension.      Palpations: Abdomen is soft.      Tenderness: There is no abdominal tenderness.   Musculoskeletal:         General: Normal range of motion.      Cervical back: Normal range of motion and neck supple.      Thoracic back: No tenderness.      Lumbar back: No tenderness.      Comments: Prior incisions well-healed   Skin:     General: Skin is warm and dry.   Neurological:      Mental Status: She is oriented to person, place, and time.      Deep Tendon Reflexes:      Reflex Scores:       Bicep reflexes are 2+ on the right side and 2+ on the left side.       Patellar reflexes are 2+ on the right side and 2+ on the left side.  Psychiatric:         Attention and Perception: Attention and perception normal.         Mood and Affect: Mood and affect normal.         Speech: Speech normal.         Behavior: Behavior normal. Behavior is cooperative.         Thought Content: Thought content normal.         Cognition and Memory: Cognition and memory normal.         Judgment: Judgment normal.       Neurological Exam  Mental Status  Awake, alert and oriented to person, place and time. Oriented to person, place, and time. Memory is normal. Speech is normal.    Cranial Nerves  CN III, IV, VI: Extraocular movements intact bilaterally. Pupils equal round and reactive to light bilaterally.  CN V: Facial sensation is normal.  CN VII: Full and symmetric facial movement.  CN VIII: Hearing is normal.  CN XI: Shoulder shrug strength is  normal.  CN XII: Tongue midline without atrophy or fasciculations.    Motor  Normal muscle bulk throughout. Normal muscle tone.  Strength 5/5 throughout.    Sensory  Light touch is normal in upper and lower extremities.     Reflexes                                            Right                      Left  Biceps                                 2+                         2+  Patellar                                2+                         2+    Right pathological reflexes: Luc's absent. Ankle clonus absent.  Left pathological reflexes: Luc's absent. Ankle clonus absent.    Gait  Casual gait is normal including stance, stride, and arm swing.

## 2025-01-17 ENCOUNTER — EVALUATION (OUTPATIENT)
Dept: PHYSICAL THERAPY | Facility: REHABILITATION | Age: 70
End: 2025-01-17
Payer: MEDICARE

## 2025-01-17 DIAGNOSIS — M54.50 CHRONIC BILATERAL LOW BACK PAIN WITHOUT SCIATICA: Primary | ICD-10-CM

## 2025-01-17 DIAGNOSIS — N39.46 MIXED STRESS AND URGE URINARY INCONTINENCE: ICD-10-CM

## 2025-01-17 DIAGNOSIS — R15.1 FECAL SMEARING: ICD-10-CM

## 2025-01-17 DIAGNOSIS — G89.29 CHRONIC BILATERAL LOW BACK PAIN WITHOUT SCIATICA: Primary | ICD-10-CM

## 2025-01-17 PROCEDURE — 97530 THERAPEUTIC ACTIVITIES: CPT | Performed by: PHYSICAL THERAPIST

## 2025-01-17 PROCEDURE — 97162 PT EVAL MOD COMPLEX 30 MIN: CPT | Performed by: PHYSICAL THERAPIST
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
## 2025-01-24 ENCOUNTER — OFFICE VISIT (OUTPATIENT)
Dept: PHYSICAL THERAPY | Facility: REHABILITATION | Age: 70
End: 2025-01-24
Payer: MEDICARE

## 2025-01-24 DIAGNOSIS — R15.1 FECAL SMEARING: ICD-10-CM

## 2025-01-24 DIAGNOSIS — N39.46 MIXED STRESS AND URGE URINARY INCONTINENCE: ICD-10-CM

## 2025-01-24 DIAGNOSIS — M54.50 CHRONIC BILATERAL LOW BACK PAIN WITHOUT SCIATICA: Primary | ICD-10-CM

## 2025-01-24 DIAGNOSIS — G89.29 CHRONIC BILATERAL LOW BACK PAIN WITHOUT SCIATICA: Primary | ICD-10-CM

## 2025-01-24 PROCEDURE — 97112 NEUROMUSCULAR REEDUCATION: CPT | Performed by: PHYSICAL THERAPIST

## 2025-01-24 PROCEDURE — 97530 THERAPEUTIC ACTIVITIES: CPT | Performed by: PHYSICAL THERAPIST

## 2025-01-24 PROCEDURE — 97110 THERAPEUTIC EXERCISES: CPT | Performed by: PHYSICAL THERAPIST

## 2025-01-24 NOTE — PROGRESS NOTES
Daily Note     Today's date: 2025  Patient name: Lainey Baldwin  : 1955  MRN: [id number]  Referring provider: Maricruz Belle,*  Dx:   Encounter Diagnosis     ICD-10-CM    1. Chronic bilateral low back pain without sciatica  M54.50     G89.29       2. Mixed stress and urge urinary incontinence  N39.46       3. Fecal smearing  R15.1           Start Time: 1105  Stop Time: 1200  Total time in clinic (min): 55 minutes    Subjective: Patient reports no problems with her low back within the last week but has not been as active secondary to the weather. She brought bladder and bowel diary that she is keeping independently revealing some episodes of urinary urgency, incontinence and fecal incontinence.      Objective: See treatment diary below      Assessment: Tolerated treatment well. Initiated plan of care this visit with a focus on diaphragmatic breathing, deep core, pelvic floor and pelvic girdle activation. Patient reports core and pelvic floor muscle fatigue progressing throughout session. Overall good form and coordination noted. Provided patient with updated HEP as well as handout for urinary urge suppression techniques. Also reviewed avoiding just in case urination. Patient demonstrated fatigue post treatment, exhibited good technique with therapeutic exercises, and would benefit from continued PT.      Plan: Continue per plan of care.      Precautions:   Past Medical History:   Diagnosis Date    Age-related osteoporosis without current pathological fracture  2021    Allergic     Poison Ivy    Chronic low back pain 2020    Disease of thyroid gland     Ectopic pregnancy     Encounter for annual routine gynecological examination 2019    Encounter for postoperative care related to surgical joint fusion 10/28/2021    Encounter for screening mammogram for malignant neoplasm of breast 2021    High cholesterol     HL (hearing loss)     Hypothyroidism     Mid back pain  09/14/2021    Migraine     During perimenopause, not a problem now    Miscarriage     ectopic 1984, miscarriage 1992    Osteopenia     Peroneal tendonitis     Vaginal atrophy 05/30/2019    Varicella     Date unknown     L4/L5 SPINAL FUSION IN 2021, SACRAL STIMULATOR PLACED 2024 FOR FECAL AND URINARY INCONTINENCE  Biofeedback Codes:   Goals:decreased LBP with lifting (wants to lift 75#), decreased urinary incontinence and fecal incontinence, update HEP      Manuals 1/17 1/24                                     Neuro Re-Ed             SEMG Biofeedback **  STIMULATOR Nv stimulator off           Diaphragmatic breathing nv Done           TA ADIM nv 10x           PFMC Slow Holds nv 10x           PFMC Quick Flicks ** 10x           Seated PFMC  nv           Standing PFMC  nv           Ther Ex             PFMC + hip abd iso nv GTB 5x           PFMC + hip add iso nv 5x           PPT nv            TA + leg exts  nv           TA+heel taps  nv           TA+Dead bugs  nv           PFMC + bridging nv nv           SL bridge w pulses             Quadruped TA + PFMC             TA + TB trunk rotations             TA + TB half kneeling chops                                       Ther Activity             Education Done  Done           Urge suppression nv Done            Bladder diary Patient doing on her own Reviewed           Update HEP nv Done            PFMC + STS  ** nv           Goblet squats **            Deadlifts **

## 2025-01-31 ENCOUNTER — OFFICE VISIT (OUTPATIENT)
Dept: PHYSICAL THERAPY | Facility: REHABILITATION | Age: 70
End: 2025-01-31
Payer: MEDICARE

## 2025-01-31 DIAGNOSIS — G89.29 CHRONIC BILATERAL LOW BACK PAIN WITHOUT SCIATICA: Primary | ICD-10-CM

## 2025-01-31 DIAGNOSIS — N39.46 MIXED STRESS AND URGE URINARY INCONTINENCE: ICD-10-CM

## 2025-01-31 DIAGNOSIS — R15.1 FECAL SMEARING: ICD-10-CM

## 2025-01-31 DIAGNOSIS — M54.50 CHRONIC BILATERAL LOW BACK PAIN WITHOUT SCIATICA: Primary | ICD-10-CM

## 2025-01-31 PROCEDURE — 97110 THERAPEUTIC EXERCISES: CPT | Performed by: PHYSICAL THERAPIST

## 2025-01-31 PROCEDURE — 90913 BFB TRAINING EA ADDL 15 MIN: CPT | Performed by: PHYSICAL THERAPIST

## 2025-01-31 PROCEDURE — 90912 BFB TRAINING 1ST 15 MIN: CPT | Performed by: PHYSICAL THERAPIST

## 2025-01-31 PROCEDURE — 97530 THERAPEUTIC ACTIVITIES: CPT | Performed by: PHYSICAL THERAPIST

## 2025-01-31 NOTE — PROGRESS NOTES
Daily Note     Today's date: 2025  Patient name: Lainey Baldwin  : 1955  MRN: [id number]  Referring provider: Maricruz Belle,*  Dx:   Encounter Diagnosis     ICD-10-CM    1. Chronic bilateral low back pain without sciatica  M54.50     G89.29       2. Mixed stress and urge urinary incontinence  N39.46       3. Fecal smearing  R15.1           Start Time: 1103  Stop Time: 1200  Total time in clinic (min): 57 minutes    Subjective: Patient reports compliance to pelvic floor HEP provided last visit. No issues with her low back in the last week but was not as active as usual.      Objective: See treatment diary below      Assessment: Tolerated treatment well. Initiated additional TE with focus on deep core stabilization with good tolerance and muscle fatigue noted. Utilized biofeedback this visit with positive response from patient with visual feedback. Patient demonstrates good muscle recruitment, endurance and relaxation with overall good coordination. Encourage patient to complete PFM exercises sitting and standing at home. Patient demonstrated fatigue post treatment, exhibited good technique with therapeutic exercises, and would benefit from continued PT.      Plan: Continue per plan of care.      Precautions:   Past Medical History:   Diagnosis Date    Age-related osteoporosis without current pathological fracture  2021    Allergic     Poison Ivy    Chronic low back pain 2020    Disease of thyroid gland     Ectopic pregnancy 1984    Encounter for annual routine gynecological examination 2019    Encounter for postoperative care related to surgical joint fusion 10/28/2021    Encounter for screening mammogram for malignant neoplasm of breast 2021    High cholesterol     HL (hearing loss)     Hypothyroidism     Mid back pain 2021    Migraine     During perimenopause, not a problem now    Miscarriage     ectopic , miscarriage     Osteopenia     Peroneal  tendonitis     Vaginal atrophy 05/30/2019    Varicella     Date unknown     L4/L5 SPINAL FUSION IN 2021, SACRAL STIMULATOR PLACED 2024 FOR FECAL AND URINARY INCONTINENCE  Biofeedback Codes: covered  Goals:decreased LBP with lifting (wants to lift 75#), decreased urinary incontinence and fecal incontinence, update HEP      Manuals 1/17 1/24 1/31                                    Neuro Re-Ed             SEMG Biofeedback **  STIMULATOR Nv stimulator off Done          Diaphragmatic breathing nv Done           TA ADIM nv 10x +PFMC 10x          PFMC Slow Holds nv 10x           PFMC Quick Flicks ** 10x           Seated PFMC  nv BFB   SH 10x  QF 10X          Standing PFMC  nv BFB   SH 10x  QF 10x          Ther Ex             PFMC + hip abd iso nv GTB 5x Seated hep          PFMC + hip add iso nv 5x Seated hep          PPT nv            TA + leg exts  nv 10x          TA+heel taps  nv 3x5          TA+Dead bugs  nv           PFMC + bridging nv nv 10x          SL bridge w pulses             Quadruped TA + PFMC             TA + TB trunk rotations   nv          TA + TB half kneeling chops   nv                                    Ther Activity             Education Done  Done Done           Urge suppression nv Done            Bladder diary Patient doing on her own Reviewed           Update HEP nv Done  Done          PFMC + STS  ** nv nv          Goblet squats **  nv          Deadlifts **  nv

## 2025-02-03 ENCOUNTER — TELEPHONE (OUTPATIENT)
Age: 70
End: 2025-02-03

## 2025-02-03 NOTE — TELEPHONE ENCOUNTER
Patient called to let Dr. Villafana know that she was able to get back on her E-string. Patient has a yearly appt on 7/8/25   normal

## 2025-02-07 ENCOUNTER — OFFICE VISIT (OUTPATIENT)
Dept: PHYSICAL THERAPY | Facility: REHABILITATION | Age: 70
End: 2025-02-07
Payer: MEDICARE

## 2025-02-07 DIAGNOSIS — M54.50 CHRONIC BILATERAL LOW BACK PAIN WITHOUT SCIATICA: Primary | ICD-10-CM

## 2025-02-07 DIAGNOSIS — R15.1 FECAL SMEARING: ICD-10-CM

## 2025-02-07 DIAGNOSIS — G89.29 CHRONIC BILATERAL LOW BACK PAIN WITHOUT SCIATICA: Primary | ICD-10-CM

## 2025-02-07 DIAGNOSIS — N39.46 MIXED STRESS AND URGE URINARY INCONTINENCE: ICD-10-CM

## 2025-02-07 PROCEDURE — 97110 THERAPEUTIC EXERCISES: CPT | Performed by: PHYSICAL THERAPIST

## 2025-02-07 PROCEDURE — 97530 THERAPEUTIC ACTIVITIES: CPT | Performed by: PHYSICAL THERAPIST

## 2025-02-07 NOTE — PROGRESS NOTES
"Daily Note     Today's date: 2025  Patient name: Lainey Baldwin  : 1955  MRN: [id number]  Referring provider: Maricruz Belle,*  Dx:   Encounter Diagnosis     ICD-10-CM    1. Chronic bilateral low back pain without sciatica  M54.50     G89.29       2. Mixed stress and urge urinary incontinence  N39.46       3. Fecal smearing  R15.1           Start Time: 1105  Stop Time: 1155  Total time in clinic (min): 50 minutes    Subjective: Patient has noticed some improvement in her posture. She feels the abdominal exercises are helping \"swayback\" posture. She has noticed less frequent urinary leakage but can have some continued leakage if she waits to use the restroom for too long.          Objective: See treatment diary below      Assessment: Tolerated treatment well. Initiated additional TE this visit with a focus on standing deep core and back stabilization with good tolerance and muscle fatigue noted. Also initiated deadlifting for additional posterior chain strengthening and lifting mechanics. Provided updated HEP. Patient demonstrated fatigue post treatment, exhibited good technique with therapeutic exercises, and would benefit from continued PT.      Plan: Continue per plan of care.      Precautions:   Past Medical History:   Diagnosis Date    Age-related osteoporosis without current pathological fracture  2021    Allergic     Poison Ivy    Chronic low back pain 2020    Disease of thyroid gland     Ectopic pregnancy 1984    Encounter for annual routine gynecological examination 2019    Encounter for postoperative care related to surgical joint fusion 10/28/2021    Encounter for screening mammogram for malignant neoplasm of breast 2021    High cholesterol     HL (hearing loss) 1975    Hypothyroidism     Mid back pain 2021    Migraine     During perimenopause, not a problem now    Miscarriage     ectopic 1984, miscarriage     Osteopenia     Peroneal tendonitis     " "Vaginal atrophy 05/30/2019    Varicella     Date unknown     L4/L5 SPINAL FUSION IN 2021, SACRAL STIMULATOR PLACED 2024 FOR FECAL AND URINARY INCONTINENCE  Biofeedback Codes: covered  Goals: decreased LBP with lifting (wants to lift 75#), decreased urinary incontinence and fecal incontinence, update HEP    Gardening lifting mulch from car <> ground and ground <> wheelbarrow    Manuals 1/17 1/24 1/31 2/7                                   Neuro Re-Ed             SEMG Biofeedback **  STIMULATOR Nv stimulator off Done          Diaphragmatic breathing nv Done           TA ADIM nv 10x +PFMC 10x          PFMC Slow Holds nv 10x           PFMC Quick Flicks ** 10x           Seated PFMC  nv BFB   SH 10x  QF 10X          Standing PFMC  nv BFB   SH 10x  QF 10x          Ther Ex             PFMC + hip abd iso nv GTB 5x Seated hep dc         PFMC + hip add iso nv 5x Seated hep dc         PPT nv            TA + leg exts  nv 10x 10x          TA+heel taps  nv 3x5 2x10         TA+Dead bugs  nv           PFMC + bridging nv nv 10x 10x          SL bridge w pulses             Quadruped TA + PFMC             TA + TB paloff press*    Yellow 10x ea         TA + TB trunk rotations*   nv Yellow 10x ea         TA + TB half kneeling chops   nv                                    Ther Activity             Education Done  Done Done  Done         Urge suppression nv Done            Bladder diary Patient doing on her own Reviewed           Update HEP nv Done  Done          PFMC + STS  ** nv nv 10x         Goblet squats **  nv Already part of HEP         Deadlifts* **  nv 8\" step 25# 2x10         Table plank shoulder taps    nv         Table plank alt arm/leg exts    nv                                   "

## 2025-02-14 ENCOUNTER — OFFICE VISIT (OUTPATIENT)
Dept: PHYSICAL THERAPY | Facility: REHABILITATION | Age: 70
End: 2025-02-14
Payer: MEDICARE

## 2025-02-14 DIAGNOSIS — G89.29 CHRONIC BILATERAL LOW BACK PAIN WITHOUT SCIATICA: Primary | ICD-10-CM

## 2025-02-14 DIAGNOSIS — N39.46 MIXED STRESS AND URGE URINARY INCONTINENCE: ICD-10-CM

## 2025-02-14 DIAGNOSIS — M54.50 CHRONIC BILATERAL LOW BACK PAIN WITHOUT SCIATICA: Primary | ICD-10-CM

## 2025-02-14 DIAGNOSIS — R15.1 FECAL SMEARING: ICD-10-CM

## 2025-02-14 PROCEDURE — 97110 THERAPEUTIC EXERCISES: CPT | Performed by: PHYSICAL THERAPIST

## 2025-02-14 PROCEDURE — 97530 THERAPEUTIC ACTIVITIES: CPT | Performed by: PHYSICAL THERAPIST

## 2025-02-14 NOTE — PROGRESS NOTES
Daily Note     Today's date: 2025  Patient name: Lainey Baldwin  : 1955  MRN: [id number]  Referring provider: Maricruz Belle,*  Dx:   Encounter Diagnosis     ICD-10-CM    1. Chronic bilateral low back pain without sciatica  M54.50     G89.29       2. Mixed stress and urge urinary incontinence  N39.46       3. Fecal smearing  R15.1           Start Time: 1102  Stop Time: 1200  Total time in clinic (min): 58 minutes    Subjective: Patient shoveled snow this week focusing on more pushing than lifting so she did not report any significant pain in the low back. She feels core exercises have made improvements in back and pelvic floor strength. She reports no noticeable urinary incontinence this week but was limiting water intake.      Objective: See treatment diary below      Assessment: Tolerated treatment well. Initiated additional abdominal and hip strengthening this visit and provided patient with updated HEP replacing exercises with these progressions. Patient demonstrated fatigue post treatment, exhibited good technique with therapeutic exercises, and would benefit from continued PT.      Plan: Continue per plan of care.      Precautions:   Past Medical History:   Diagnosis Date    Age-related osteoporosis without current pathological fracture  2021    Allergic     Poison Ivy    Chronic low back pain 2020    Disease of thyroid gland     Ectopic pregnancy 1984    Encounter for annual routine gynecological examination 2019    Encounter for postoperative care related to surgical joint fusion 10/28/2021    Encounter for screening mammogram for malignant neoplasm of breast 2021    High cholesterol     HL (hearing loss) 1975    Hypothyroidism     Mid back pain 2021    Migraine     During perimenopause, not a problem now    Miscarriage     ectopic , miscarriage     Osteopenia     Peroneal tendonitis     Vaginal atrophy 2019    Varicella     Date unknown  "    L4/L5 SPINAL FUSION IN 2021, SACRAL STIMULATOR PLACED 2024 FOR FECAL AND URINARY INCONTINENCE  Biofeedback Codes: covered  Goals: decreased LBP with lifting (wants to lift 75#), decreased urinary incontinence and fecal incontinence, update HEP    Gardening lifting mulch from car <> ground and ground <> wheelbarrow    Manuals 1/17 1/24 1/31 2/7 2/14                                  Neuro Re-Ed             SEMG Biofeedback **  STIMULATOR Nv stimulator off Done          Diaphragmatic breathing nv Done           TA ADIM nv 10x +PFMC 10x          PFMC Slow Holds nv 10x           PFMC Quick Flicks ** 10x           Seated PFMC  nv BFB   SH 10x  QF 10X          Standing PFMC  nv BFB   SH 10x  QF 10x          Ther Ex             PFMC + hip abd iso nv GTB 5x Seated hep dc         PFMC + hip add iso nv 5x Seated hep dc         PPT nv            TA + leg exts  nv 10x 10x  2x10        TA+heel taps  nv 3x5 2x10 2x10        TA+Dead bugs  nv           PFMC + bridging nv nv 10x 10x  10x        Bridging (replaced ball bridge at home)     10# 2x10        Modified sideplank w SLR abd     2x10 b/l        SL bridge w pulses             Quadruped TA + PFMC             TA + TB paloff press*    Yellow 10x ea Yellow 10x ea        TA + TB trunk rotations*   nv Yellow 10x ea Yellow 10x ea        TA + TB half kneeling chops   nv                                    Ther Activity             Education Done  Done Done  Done Done        Urge suppression nv Done            Bladder diary Patient doing on her own Reviewed           Update HEP nv Done  Done          PFMC + STS  ** nv nv 10x 10x        Goblet squats **  nv Already part of HEP         Deadlifts* **  nv 8\" step 25# 2x10 8\" step 25# 2x10        Table plank shoulder taps    nv 2x10        Table plank alt arm/leg exts (replaced dead bugs at home)    nv 2x10        CC bar pull outs for sliding mulch bags     nv                       "

## 2025-02-20 DIAGNOSIS — E03.9 ACQUIRED HYPOTHYROIDISM: ICD-10-CM

## 2025-02-20 DIAGNOSIS — E78.2 MIXED HYPERLIPIDEMIA: ICD-10-CM

## 2025-02-20 RX ORDER — LEVOTHYROXINE SODIUM 100 UG/1
100 TABLET ORAL DAILY
Qty: 90 TABLET | Refills: 1 | Status: SHIPPED | OUTPATIENT
Start: 2025-02-20

## 2025-02-20 RX ORDER — ATORVASTATIN CALCIUM 10 MG/1
10 TABLET, FILM COATED ORAL DAILY
Qty: 90 TABLET | Refills: 1 | Status: SHIPPED | OUTPATIENT
Start: 2025-02-20

## 2025-02-21 ENCOUNTER — OFFICE VISIT (OUTPATIENT)
Dept: PHYSICAL THERAPY | Facility: REHABILITATION | Age: 70
End: 2025-02-21
Payer: MEDICARE

## 2025-02-21 DIAGNOSIS — R15.1 FECAL SMEARING: ICD-10-CM

## 2025-02-21 DIAGNOSIS — G89.29 CHRONIC BILATERAL LOW BACK PAIN WITHOUT SCIATICA: Primary | ICD-10-CM

## 2025-02-21 DIAGNOSIS — N39.46 MIXED STRESS AND URGE URINARY INCONTINENCE: ICD-10-CM

## 2025-02-21 DIAGNOSIS — M54.50 CHRONIC BILATERAL LOW BACK PAIN WITHOUT SCIATICA: Primary | ICD-10-CM

## 2025-02-21 PROCEDURE — 97530 THERAPEUTIC ACTIVITIES: CPT | Performed by: PHYSICAL THERAPIST

## 2025-02-21 PROCEDURE — 97110 THERAPEUTIC EXERCISES: CPT | Performed by: PHYSICAL THERAPIST

## 2025-02-21 NOTE — PROGRESS NOTES
Daily Note     Today's date: 2025  Patient name: Lainey Baldwin  : 1955  MRN: [id number]  Referring provider: Maricruz Belle,*  Dx:   Encounter Diagnosis     ICD-10-CM    1. Chronic bilateral low back pain without sciatica  M54.50     G89.29       2. Mixed stress and urge urinary incontinence  N39.46       3. Fecal smearing  R15.1           Start Time: 1105  Stop Time: 1150  Total time in clinic (min): 45 minutes    Subjective: Patient reports the back has been feeling ok. She has not had issues with incontinence over the last week and has rescheduled the appointment with urology for a few months from now.      Objective: See treatment diary below      Assessment: Tolerated treatment well. Initiated additional TE for focus on functional abdominal and trunk strengthening with lifting to translate to gardening tasks. Muscle fatigue noted but no complaints of pain. Patient demonstrated fatigue post treatment, exhibited good technique with therapeutic exercises, and would benefit from continued PT.      Plan: Continue per plan of care.      Precautions:   Past Medical History:   Diagnosis Date    Age-related osteoporosis without current pathological fracture  2021    Allergic     Poison Ivy    Chronic low back pain 2020    Disease of thyroid gland     Ectopic pregnancy 1984    Encounter for annual routine gynecological examination 2019    Encounter for postoperative care related to surgical joint fusion 10/28/2021    Encounter for screening mammogram for malignant neoplasm of breast 2021    High cholesterol     HL (hearing loss) 1975    Hypothyroidism     Mid back pain 2021    Migraine     During perimenopause, not a problem now    Miscarriage     ectopic , miscarriage     Osteopenia     Peroneal tendonitis     Vaginal atrophy 2019    Varicella     Date unknown     L4/L5 SPINAL FUSION IN , SACRAL STIMULATOR PLACED  FOR FECAL AND URINARY  "INCONTINENCE  Biofeedback Codes: covered  Goals: decreased LBP with lifting (wants to lift 75#), decreased urinary incontinence and fecal incontinence, update HEP    Gardening lifting mulch from car <> ground and ground <> wheelbarrow    Manuals 1/17 1/24 1/31 2/7 2/14 2/21                                 Neuro Re-Ed             SEMG Biofeedback **  STIMULATOR Nv stimulator off Done          Diaphragmatic breathing nv Done           TA ADIM nv 10x +PFMC 10x          PFMC Slow Holds nv 10x           PFMC Quick Flicks ** 10x           Seated PFMC  nv BFB   SH 10x  QF 10X          Standing PFMC  nv BFB   SH 10x  QF 10x          Ther Ex             PFMC + hip abd iso nv GTB 5x Seated hep dc         PFMC + hip add iso nv 5x Seated hep dc         PPT nv            TA + leg exts  nv 10x 10x  2x10 2x10       TA+heel taps  nv 3x5 2x10 2x10 Bicycles 2x10       TA+Dead bugs  nv           PFMC + bridging nv nv 10x 10x  10x        Bridging (replaced ball bridge at home)     10# 2x10 10# 3x10       Modified sideplank w SLR abd     2x10 b/l 2x10 b/l       SL bridge w pulses             Quadruped TA + PFMC             TA + TB paloff press*    Yellow 10x ea Yellow 10x ea Yellow 10x ea       TA + TB trunk rotations*   nv Yellow 10x ea Yellow 10x ea Yellow 10x ea       TA + TB half kneeling chops*   nv   25# 10x ea                                 Ther Activity             Education Done  Done Done  Done Done        Urge suppression nv Done            Bladder diary Patient doing on her own Reviewed           Update HEP nv Done  Done          PFMC + STS  ** nv nv 10x 10x        Goblet squats **  nv Already part of HEP         Deadlifts* **  nv 8\" step 25# 2x10 8\" step 25# 2x10 8\" step 25# 2x10       Table plank shoulder taps    nv 2x10 3x10       Table plank alt arm/leg exts (replaced dead bugs at home)    nv 2x10 2x10       CC bar pull outs for sliding mulch bags     nv 50# 10x  70# 10x                        "

## 2025-02-28 ENCOUNTER — OFFICE VISIT (OUTPATIENT)
Dept: PHYSICAL THERAPY | Facility: REHABILITATION | Age: 70
End: 2025-02-28
Payer: MEDICARE

## 2025-02-28 DIAGNOSIS — R15.1 FECAL SMEARING: ICD-10-CM

## 2025-02-28 DIAGNOSIS — N39.46 MIXED STRESS AND URGE URINARY INCONTINENCE: ICD-10-CM

## 2025-02-28 DIAGNOSIS — M54.50 CHRONIC BILATERAL LOW BACK PAIN WITHOUT SCIATICA: Primary | ICD-10-CM

## 2025-02-28 DIAGNOSIS — G89.29 CHRONIC BILATERAL LOW BACK PAIN WITHOUT SCIATICA: Primary | ICD-10-CM

## 2025-02-28 PROCEDURE — 97530 THERAPEUTIC ACTIVITIES: CPT | Performed by: PHYSICAL THERAPIST

## 2025-02-28 PROCEDURE — 97110 THERAPEUTIC EXERCISES: CPT | Performed by: PHYSICAL THERAPIST

## 2025-02-28 NOTE — PROGRESS NOTES
Daily Note     Today's date: 2025  Patient name: Lainey Baldwin  : 1955  MRN: [id number]  Referring provider: Maricruz Belle,*  Dx:   Encounter Diagnosis     ICD-10-CM    1. Chronic bilateral low back pain without sciatica  M54.50     G89.29       2. Mixed stress and urge urinary incontinence  N39.46       3. Fecal smearing  R15.1           Start Time: 1100  Stop Time: 1200  Total time in clinic (min): 60 minutes    Subjective: Patient reports no low back issues over the last week. She notes pelvic floor issues are about the same but has been practicing urge suppression techniques at home.       Objective: See treatment diary below      Assessment: Lainey has made the following improvements since beginning PT: increased strength, increased postural control and awareness, and increased tolerance to activities. Lainey and PT mutually agree to transition to HEP at this time secondary to gains made in PT and independence with HEP. she has been given updated HEP with verbalized understanding and compliance. Organized and consolidated patient's exercises to target core, back and pelvic floor strengthening at home. Lainey is encouraged to contact PT with any questions or concerns in the future. She is scheduled for IE to evaluate chronic right knee and left elbow pain.        Plan: Plan to discharge with comprehensive HEP for continued and maintained gains made in PT.       Precautions:   Past Medical History:   Diagnosis Date    Age-related osteoporosis without current pathological fracture  2021    Allergic     Poison Ivy    Chronic low back pain 2020    Disease of thyroid gland     Ectopic pregnancy 1984    Encounter for annual routine gynecological examination 2019    Encounter for postoperative care related to surgical joint fusion 10/28/2021    Encounter for screening mammogram for malignant neoplasm of breast 2021    High cholesterol     HL (hearing loss) 1975     Hypothyroidism     Mid back pain 09/14/2021    Migraine     During perimenopause, not a problem now    Miscarriage     ectopic 1984, miscarriage 1992    Osteopenia     Peroneal tendonitis     Vaginal atrophy 05/30/2019    Varicella     Date unknown     L4/L5 SPINAL FUSION IN 2021, SACRAL STIMULATOR PLACED 2024 FOR FECAL AND URINARY INCONTINENCE  Biofeedback Codes: covered  Goals: decreased LBP with lifting (wants to lift 75#), decreased urinary incontinence and fecal incontinence, update HEP    Gardening lifting mulch from car <> ground and ground <> wheelbarrow    Manuals 1/17 1/24 1/31 2/7 2/14 2/21 2/28                                Neuro Re-Ed             SEMG Biofeedback **  STIMULATOR Nv stimulator off Done          Diaphragmatic breathing nv Done           TA ADIM nv 10x +PFMC 10x          PFMC Slow Holds nv 10x           PFMC Quick Flicks ** 10x           Seated PFMC  nv BFB   SH 10x  QF 10X          Standing PFMC  nv BFB   SH 10x  QF 10x          Ther Ex             PFMC + hip abd iso nv GTB 5x Seated hep dc         PFMC + hip add iso nv 5x Seated hep dc         PPT nv            TA + leg exts  nv 10x 10x  2x10 2x10       TA+heel taps  nv 3x5 2x10 2x10 Bicycles 2x10 Bicycles 2x10      TA+Dead bugs  nv           PFMC + bridging nv nv 10x 10x  10x        Bridging (replaced ball bridge at home)     10# 2x10 10# 3x10 10# 3x12      Modified sideplank w SLR abd     2x10 b/l 2x10 b/l 3x8 b/l      SL bridge w pulses             Quadruped TA + PFMC             TA + TB paloff press*    Yellow 10x ea Yellow 10x ea Yellow 10x ea Yellow 10x ea      TA + TB trunk rotations*   nv Yellow 10x ea Yellow 10x ea Yellow 10x ea Yellow 10x ea      TA + TB half kneeling chops*   nv   25# 10x ea Red 10x ea                                Ther Activity             Education Done  Done Done  Done Done  Done -HEP      Urge suppression nv Done            Bladder diary Patient doing on her own Reviewed           Update HEP nv Done  Done  "         PFMC + STS  ** nv nv 10x 10x        Goblet squats **  nv Already part of HEP         Deadlifts* **  nv 8\" step 25# 2x10 8\" step 25# 2x10 8\" step 25# 2x10 8\" step 25# 2x10      Table plank shoulder taps    nv 2x10 3x10       Table plank alt arm/leg exts (replaced dead bugs at home)    nv 2x10 2x10 3x10       CC bar pull outs for sliding mulch bags     nv 50# 10x  70# 10x 70# 10x push  10x pull                         "

## 2025-03-03 DIAGNOSIS — M25.561 CHRONIC PAIN OF RIGHT KNEE: Primary | ICD-10-CM

## 2025-03-03 DIAGNOSIS — G89.29 CHRONIC PAIN OF RIGHT KNEE: Primary | ICD-10-CM

## 2025-03-06 ENCOUNTER — EVALUATION (OUTPATIENT)
Dept: PHYSICAL THERAPY | Facility: REHABILITATION | Age: 70
End: 2025-03-06
Payer: MEDICARE

## 2025-03-06 DIAGNOSIS — G89.29 CHRONIC PAIN OF RIGHT KNEE: Primary | ICD-10-CM

## 2025-03-06 DIAGNOSIS — M25.561 CHRONIC PAIN OF RIGHT KNEE: Primary | ICD-10-CM

## 2025-03-06 PROCEDURE — 97161 PT EVAL LOW COMPLEX 20 MIN: CPT | Performed by: PHYSICAL THERAPIST

## 2025-03-06 PROCEDURE — 97110 THERAPEUTIC EXERCISES: CPT | Performed by: PHYSICAL THERAPIST
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
## 2025-03-14 ENCOUNTER — EVALUATION (OUTPATIENT)
Dept: PHYSICAL THERAPY | Facility: REHABILITATION | Age: 70
End: 2025-03-14
Payer: MEDICARE

## 2025-03-14 ENCOUNTER — APPOINTMENT (OUTPATIENT)
Dept: LAB | Facility: CLINIC | Age: 70
End: 2025-03-14
Payer: MEDICARE

## 2025-03-14 DIAGNOSIS — E55.9 VITAMIN D DEFICIENCY: ICD-10-CM

## 2025-03-14 DIAGNOSIS — E78.2 MIXED HYPERLIPIDEMIA: ICD-10-CM

## 2025-03-14 DIAGNOSIS — M25.522 PAIN IN LEFT ELBOW: Primary | ICD-10-CM

## 2025-03-14 LAB
25(OH)D3 SERPL-MCNC: 49 NG/ML (ref 30–100)
ALBUMIN SERPL BCG-MCNC: 4.4 G/DL (ref 3.5–5)
ALP SERPL-CCNC: 83 U/L (ref 34–104)
ALT SERPL W P-5'-P-CCNC: 33 U/L (ref 7–52)
ANION GAP SERPL CALCULATED.3IONS-SCNC: 9 MMOL/L (ref 4–13)
AST SERPL W P-5'-P-CCNC: 44 U/L (ref 13–39)
BASOPHILS # BLD AUTO: 0.06 THOUSANDS/ÂΜL (ref 0–0.1)
BASOPHILS NFR BLD AUTO: 1 % (ref 0–1)
BILIRUB SERPL-MCNC: 0.58 MG/DL (ref 0.2–1)
BUN SERPL-MCNC: 18 MG/DL (ref 5–25)
CALCIUM SERPL-MCNC: 9.5 MG/DL (ref 8.4–10.2)
CHLORIDE SERPL-SCNC: 100 MMOL/L (ref 96–108)
CHOLEST SERPL-MCNC: 206 MG/DL (ref ?–200)
CO2 SERPL-SCNC: 29 MMOL/L (ref 21–32)
CREAT SERPL-MCNC: 0.95 MG/DL (ref 0.6–1.3)
EOSINOPHIL # BLD AUTO: 0.2 THOUSAND/ÂΜL (ref 0–0.61)
EOSINOPHIL NFR BLD AUTO: 4 % (ref 0–6)
ERYTHROCYTE [DISTWIDTH] IN BLOOD BY AUTOMATED COUNT: 12.8 % (ref 11.6–15.1)
GFR SERPL CREATININE-BSD FRML MDRD: 60 ML/MIN/1.73SQ M
GLUCOSE P FAST SERPL-MCNC: 83 MG/DL (ref 65–99)
HCT VFR BLD AUTO: 39.7 % (ref 34.8–46.1)
HDLC SERPL-MCNC: 81 MG/DL
HGB BLD-MCNC: 13.2 G/DL (ref 11.5–15.4)
IMM GRANULOCYTES # BLD AUTO: 0.01 THOUSAND/UL (ref 0–0.2)
IMM GRANULOCYTES NFR BLD AUTO: 0 % (ref 0–2)
LDLC SERPL CALC-MCNC: 115 MG/DL (ref 0–100)
LYMPHOCYTES # BLD AUTO: 1.97 THOUSANDS/ÂΜL (ref 0.6–4.47)
LYMPHOCYTES NFR BLD AUTO: 36 % (ref 14–44)
MCH RBC QN AUTO: 30.3 PG (ref 26.8–34.3)
MCHC RBC AUTO-ENTMCNC: 33.2 G/DL (ref 31.4–37.4)
MCV RBC AUTO: 91 FL (ref 82–98)
MONOCYTES # BLD AUTO: 0.57 THOUSAND/ÂΜL (ref 0.17–1.22)
MONOCYTES NFR BLD AUTO: 10 % (ref 4–12)
NEUTROPHILS # BLD AUTO: 2.72 THOUSANDS/ÂΜL (ref 1.85–7.62)
NEUTS SEG NFR BLD AUTO: 49 % (ref 43–75)
NONHDLC SERPL-MCNC: 125 MG/DL
NRBC BLD AUTO-RTO: 0 /100 WBCS
PLATELET # BLD AUTO: 315 THOUSANDS/UL (ref 149–390)
PMV BLD AUTO: 9.7 FL (ref 8.9–12.7)
POTASSIUM SERPL-SCNC: 4.3 MMOL/L (ref 3.5–5.3)
PROT SERPL-MCNC: 7.1 G/DL (ref 6.4–8.4)
RBC # BLD AUTO: 4.36 MILLION/UL (ref 3.81–5.12)
SODIUM SERPL-SCNC: 138 MMOL/L (ref 135–147)
TRIGL SERPL-MCNC: 49 MG/DL (ref ?–150)
TSH SERPL DL<=0.05 MIU/L-ACNC: 6.06 UIU/ML (ref 0.45–4.5)
WBC # BLD AUTO: 5.53 THOUSAND/UL (ref 4.31–10.16)

## 2025-03-14 PROCEDURE — 80061 LIPID PANEL: CPT

## 2025-03-14 PROCEDURE — 97110 THERAPEUTIC EXERCISES: CPT | Performed by: PHYSICAL THERAPIST

## 2025-03-14 PROCEDURE — 97161 PT EVAL LOW COMPLEX 20 MIN: CPT | Performed by: PHYSICAL THERAPIST

## 2025-03-14 PROCEDURE — 36415 COLL VENOUS BLD VENIPUNCTURE: CPT

## 2025-03-14 PROCEDURE — 85025 COMPLETE CBC W/AUTO DIFF WBC: CPT

## 2025-03-14 PROCEDURE — 80053 COMPREHEN METABOLIC PANEL: CPT

## 2025-03-14 PROCEDURE — 84443 ASSAY THYROID STIM HORMONE: CPT

## 2025-03-14 PROCEDURE — 82306 VITAMIN D 25 HYDROXY: CPT

## 2025-03-14 NOTE — LETTER
2025    Son Pulliam MD  [address] [facility] [address]    Patient: Lainey Baldwin   YOB: 1955   Date of Visit: 3/14/2025     Encounter Diagnosis     ICD-10-CM    1. Pain in left elbow  M25.522           Dear Dr. Pulliam:    Thank you for your recent referral of Lainey Baldwin. Please review the attached evaluation summary from Lainey's recent visit.     Please verify that you agree with the plan of care by signing the attached order.     If you have any questions or concerns, please do not hesitate to call.     I sincerely appreciate the opportunity to share in the care of one of your patients and hope to have another opportunity to work with you in the near future.       Sincerely,    Arlene Morel, PT      Referring Provider:      I certify that I have read the below Plan of Care and certify the need for these services furnished under this plan of treatment while under my care.                    Son Pulliam MD  [address] [facility] [address]  Via Fax: [phone]          PT Evaluation     Today's date: 3/14/2025  Patient name: Lainey Baldwin  : 1955  MRN: [id number]  Referring provider: Son Pulliam MD  Dx:   Encounter Diagnosis     ICD-10-CM    1. Pain in left elbow  M25.522           Start Time: 0935  Stop Time: 1020  Total time in clinic (min): 45 minutes    Assessment  Impairments: abnormal muscle tone, activity intolerance, lacks appropriate home exercise program and pain with function    Assessment details: Lainey Baldwin is a 70 year old female who presents to  with chronic left elbow pain. She presents with tenderness to palpation at lateral epicondyle, proximal radioulnar joint and wrist extensors. Symptoms recreated with elbow in extended position. This is limited her from lifting and gardening. Lainey presents with the impairments as listed above and would benefit from Physical Therapy to  address these impairments, improved quality of life and to maximize function.       Goals  1. Patient will report <4/10 pain in the elbow upon discharge.  2. Patient independent with HEP at time of discharge.   3. Patient will be able brace herself during push ups and gardening without limitation upon discharge.      Plan  Patient would benefit from: skilled physical therapy  Planned modality interventions: cryotherapy    Planned therapy interventions: IASTM, manual therapy, neuromuscular re-education, patient/caregiver education, strengthening, stretching, therapeutic activities and therapeutic exercise    Frequency: 1x week  Duration in weeks: 6  Plan of Care beginning date: 3/14/2025  Plan of Care expiration date: 2025  Treatment plan discussed with: patient        Subjective Evaluation    History of Present Illness  Mechanism of injury: Patient is a 70 y.o. presenting to physical therapy with complaints of chronic left elbow pain for about 3 years. She noticed discomfort with gardening and weightbearing on a straight arm, picking up something heavy, stretching exercises and twisting motions. She has a left elbow tendonitis strap but does not wear it due to discomfort. She was also recommended voltaren gel and icing. Notices pain is worst in the morning.     Patient Goals  Patient goals for therapy: decreased pain    Pain  At worst pain ratin  Quality: dull ache  Aggravating factors: lifting    Social Support  Lives with: spouse    Employment status: not working  Hand dominance: right    Treatments  Current treatment: physical therapy        Objective     Palpation   Left   Hypertonic in the wrist extensors.   Tenderness of the wrist extensors.     Tenderness     Left Elbow   Tenderness in the lateral epicondyle and proximal radioulnar joint.     Left Wrist/Hand   Tenderness in the lateral epicondyle and proximal radioulnar joint.     Active Range of Motion     Left Elbow   Normal active range of  "motion    Right Elbow   Normal active range of motion    Strength/Myotome Testing     Left Shoulder     Planes of Motion   Flexion: 4+   Abduction: 4+   External rotation at 0°: 4+   Internal rotation at 0°: 4+     Right Shoulder     Planes of Motion   Flexion: 4+   Abduction: 4+   External rotation at 0°: 4+   Internal rotation at 0°: 4+     Left Elbow   Flexion: 4+  Extension: 4+  Forearm supination: 5  Forearm pronation: 5    Right Elbow   Flexion: 4+  Extension: 4+  Forearm supination: 5  Forearm pronation: 5    Left Wrist/Hand   Wrist extension: 4+ (p!)  Wrist flexion: 4+  Radial deviation: 4+ (p!)  Ulnar deviation: 5     (2nd hand position)     Trial 1: 40    Trial 2: 35    Trial 3: 30    Average: 35    Right Wrist/Hand   Wrist extension: 5  Wrist flexion: 5  Radial deviation: 5  Ulnar deviation: 5     (2nd hand position)     Trial 1: 55    Trial 2: 50    Trial 3: 40    Average: 48.33    Tests     Left Elbow   Positive Cozen's, Maudsley's and Mill's.              Manuals 3/14          IASTM + TFM extensor tendon/musculature nv                                Neuro RE-ed           Median nerve glides           Radial nerve glides                      Wrist extension eccentrics 5# 10x (or GTB)          Radial deviation eccentrics 5# 10x (or GTB)                                           Therapeutic Ex           Wrist extensor stretch 3x30\"          Hammer curls nv          Forearm supination/pronation w stick nv          Flexbar twists nv          Forearm supination flexbar            Forearm pronation flexbar           Front raises nv          Lateral raises           Digiflex  squeezes           Finger web wrist rotations                      Therapeutic Activity           Veteran walks           Farmer walks w towel                                                                                "

## 2025-03-14 NOTE — PROGRESS NOTES
PT Evaluation     Today's date: 3/14/2025  Patient name: Lainey Baldwin  : 1955  MRN: [id number]  Referring provider: Son Pulliam MD  Dx:   Encounter Diagnosis     ICD-10-CM    1. Pain in left elbow  M25.522           Start Time: 935  Stop Time: 1020  Total time in clinic (min): 45 minutes    Assessment  Impairments: abnormal muscle tone, activity intolerance, lacks appropriate home exercise program and pain with function    Assessment details: Lainey Baldwin is a 70 year old female who presents to [facility] with chronic left elbow pain. She presents with tenderness to palpation at lateral epicondyle, proximal radioulnar joint and wrist extensors. Symptoms recreated with elbow in extended position. This is limited her from lifting and gardening. Lainey presents with the impairments as listed above and would benefit from Physical Therapy to address these impairments, improved quality of life and to maximize function.       Goals  1. Patient will report <4/10 pain in the elbow upon discharge.  2. Patient independent with HEP at time of discharge.   3. Patient will be able brace herself during push ups and gardening without limitation upon discharge.      Plan  Patient would benefit from: skilled physical therapy  Planned modality interventions: cryotherapy    Planned therapy interventions: IASTM, manual therapy, neuromuscular re-education, patient/caregiver education, strengthening, stretching, therapeutic activities and therapeutic exercise    Frequency: 1x week  Duration in weeks: 6  Plan of Care beginning date: 3/14/2025  Plan of Care expiration date: 2025  Treatment plan discussed with: patient        Subjective Evaluation    History of Present Illness  Mechanism of injury: Patient is a 70 y.o. presenting to physical therapy with complaints of chronic left elbow pain for about 3 years. She noticed discomfort with gardening and weightbearing on a straight arm, picking up something heavy,  stretching exercises and twisting motions. She has a left elbow tendonitis strap but does not wear it due to discomfort. She was also recommended voltaren gel and icing. Notices pain is worst in the morning.     Patient Goals  Patient goals for therapy: decreased pain    Pain  At worst pain ratin  Quality: dull ache  Aggravating factors: lifting    Social Support  Lives with: spouse    Employment status: not working  Hand dominance: right    Treatments  Current treatment: physical therapy        Objective     Palpation   Left   Hypertonic in the wrist extensors.   Tenderness of the wrist extensors.     Tenderness     Left Elbow   Tenderness in the lateral epicondyle and proximal radioulnar joint.     Left Wrist/Hand   Tenderness in the lateral epicondyle and proximal radioulnar joint.     Active Range of Motion     Left Elbow   Normal active range of motion    Right Elbow   Normal active range of motion    Strength/Myotome Testing     Left Shoulder     Planes of Motion   Flexion: 4+   Abduction: 4+   External rotation at 0°: 4+   Internal rotation at 0°: 4+     Right Shoulder     Planes of Motion   Flexion: 4+   Abduction: 4+   External rotation at 0°: 4+   Internal rotation at 0°: 4+     Left Elbow   Flexion: 4+  Extension: 4+  Forearm supination: 5  Forearm pronation: 5    Right Elbow   Flexion: 4+  Extension: 4+  Forearm supination: 5  Forearm pronation: 5    Left Wrist/Hand   Wrist extension: 4+ (p!)  Wrist flexion: 4+  Radial deviation: 4+ (p!)  Ulnar deviation: 5     (2nd hand position)     Trial 1: 40    Trial 2: 35    Trial 3: 30    Average: 35    Right Wrist/Hand   Wrist extension: 5  Wrist flexion: 5  Radial deviation: 5  Ulnar deviation: 5     (2nd hand position)     Trial 1: 55    Trial 2: 50    Trial 3: 40    Average: 48.33    Tests     Left Elbow   Positive Cozen's, Majosephine's and Mill's.              Manuals 3/14          IASTM + TFM extensor tendon/musculature nv                            "     Neuro RE-ed           Median nerve glides           Radial nerve glides                      Wrist extension eccentrics 5# 10x (or GTB)          Radial deviation eccentrics 5# 10x (or GTB)                                           Therapeutic Ex           Wrist extensor stretch 3x30\"          Hammer curls nv          Forearm supination/pronation w stick nv          Flexbar twists nv          Forearm supination flexbar            Forearm pronation flexbar           Front raises nv          Lateral raises           Digiflex  squeezes           Finger web wrist rotations                      Therapeutic Activity           [place] walks           Farmer walks w towel                                                                "

## 2025-03-19 ENCOUNTER — OFFICE VISIT (OUTPATIENT)
Dept: FAMILY MEDICINE CLINIC | Facility: MEDICAL CENTER | Age: 70
End: 2025-03-19
Payer: MEDICARE

## 2025-03-19 VITALS
RESPIRATION RATE: 18 BRPM | WEIGHT: 146.4 LBS | HEART RATE: 59 BPM | BODY MASS INDEX: 24.39 KG/M2 | DIASTOLIC BLOOD PRESSURE: 62 MMHG | TEMPERATURE: 97.9 F | OXYGEN SATURATION: 95 % | SYSTOLIC BLOOD PRESSURE: 110 MMHG | HEIGHT: 65 IN

## 2025-03-19 DIAGNOSIS — G89.29 CHRONIC PAIN OF RIGHT KNEE: ICD-10-CM

## 2025-03-19 DIAGNOSIS — K58.0 IRRITABLE BOWEL SYNDROME WITH DIARRHEA: ICD-10-CM

## 2025-03-19 DIAGNOSIS — N39.46 MIXED STRESS AND URGE URINARY INCONTINENCE: ICD-10-CM

## 2025-03-19 DIAGNOSIS — L60.3 BRITTLE NAILS: ICD-10-CM

## 2025-03-19 DIAGNOSIS — M25.561 CHRONIC PAIN OF RIGHT KNEE: ICD-10-CM

## 2025-03-19 DIAGNOSIS — M25.522 ARTHRALGIA OF LEFT ELBOW: ICD-10-CM

## 2025-03-19 DIAGNOSIS — Z00.00 MEDICARE ANNUAL WELLNESS VISIT, SUBSEQUENT: ICD-10-CM

## 2025-03-19 DIAGNOSIS — E78.2 MIXED HYPERLIPIDEMIA: Primary | ICD-10-CM

## 2025-03-19 DIAGNOSIS — E03.9 ACQUIRED HYPOTHYROIDISM: ICD-10-CM

## 2025-03-19 DIAGNOSIS — M85.89 OSTEOPENIA OF MULTIPLE SITES: ICD-10-CM

## 2025-03-19 PROBLEM — M67.40 GANGLION CYST: Status: RESOLVED | Noted: 2024-02-01 | Resolved: 2025-03-19

## 2025-03-19 PROCEDURE — G2211 COMPLEX E/M VISIT ADD ON: HCPCS | Performed by: INTERNAL MEDICINE

## 2025-03-19 PROCEDURE — 99214 OFFICE O/P EST MOD 30 MIN: CPT | Performed by: INTERNAL MEDICINE

## 2025-03-19 PROCEDURE — G0439 PPPS, SUBSEQ VISIT: HCPCS | Performed by: INTERNAL MEDICINE

## 2025-03-19 RX ORDER — LEVOTHYROXINE SODIUM 112 UG/1
112 TABLET ORAL DAILY
Qty: 90 TABLET | Refills: 1 | Status: SHIPPED | OUTPATIENT
Start: 2025-03-19

## 2025-03-19 NOTE — ASSESSMENT & PLAN NOTE
Orders:    CBC and differential; Future    Comprehensive metabolic panel; Future    Lipid panel; Future    TSH, 3rd generation; Future  Was advised to continue taking the atorvastatin regularly

## 2025-03-19 NOTE — PATIENT INSTRUCTIONS
Medicare Preventive Visit Patient Instructions  Thank you for completing your Welcome to Medicare Visit or Medicare Annual Wellness Visit today. Your next wellness visit will be due in one year (3/20/2026).  The screening/preventive services that you may require over the next 5-10 years are detailed below. Some tests may not apply to you based off risk factors and/or age. Screening tests ordered at today's visit but not completed yet may show as past due. Also, please note that scanned in results may not display below.  Preventive Screenings:  Service Recommendations Previous Testing/Comments   Colorectal Cancer Screening  * Colonoscopy    * Fecal Occult Blood Test (FOBT)/Fecal Immunochemical Test (FIT)  * Fecal DNA/Cologuard Test  * Flexible Sigmoidoscopy Age: 45-75 years old   Colonoscopy: every 10 years (may be performed more frequently if at higher risk)  OR  FOBT/FIT: every 1 year  OR  Cologuard: every 3 years  OR  Sigmoidoscopy: every 5 years  Screening may be recommended earlier than age 45 if at higher risk for colorectal cancer. Also, an individualized decision between you and your healthcare provider will decide whether screening between the ages of 76-85 would be appropriate. Colonoscopy: 08/21/2023  FOBT/FIT: Not on file  Cologuard: 09/06/2022  Sigmoidoscopy: Not on file    Screening Current     Breast Cancer Screening Age: 40+ years old  Frequency: every 1-2 years  Not required if history of left and right mastectomy Mammogram: 12/20/2023    Screening Current   Cervical Cancer Screening Between the ages of 21-29, pap smear recommended once every 3 years.   Between the ages of 30-65, can perform pap smear with HPV co-testing every 5 years.   Recommendations may differ for women with a history of total hysterectomy, cervical cancer, or abnormal pap smears in past. Pap Smear: 12/05/2022    Screening Not Indicated   Hepatitis C Screening Once for adults born between 1945 and 1965  More frequently in  patients at high risk for Hepatitis C Hep C Antibody: 08/11/2023    Screening Current   Diabetes Screening 1-2 times per year if you're at risk for diabetes or have pre-diabetes Fasting glucose: 83 mg/dL (3/14/2025)  A1C: 5.4 % (5/24/2021)  Screening Current   Cholesterol Screening Once every 5 years if you don't have a lipid disorder. May order more often based on risk factors. Lipid panel: 03/14/2025    Screening Not Indicated  History Lipid Disorder     Other Preventive Screenings Covered by Medicare:  Abdominal Aortic Aneurysm (AAA) Screening: covered once if your at risk. You're considered to be at risk if you have a family history of AAA.  Lung Cancer Screening: covers low dose CT scan once per year if you meet all of the following conditions: (1) Age 55-77; (2) No signs or symptoms of lung cancer; (3) Current smoker or have quit smoking within the last 15 years; (4) You have a tobacco smoking history of at least 20 pack years (packs per day multiplied by number of years you smoked); (5) You get a written order from a healthcare provider.  Glaucoma Screening: covered annually if you're considered high risk: (1) You have diabetes OR (2) Family history of glaucoma OR (3)  aged 50 and older OR (4)  American aged 65 and older  Osteoporosis Screening: covered every 2 years if you meet one of the following conditions: (1) You're estrogen deficient and at risk for osteoporosis based off medical history and other findings; (2) Have a vertebral abnormality; (3) On glucocorticoid therapy for more than 3 months; (4) Have primary hyperparathyroidism; (5) On osteoporosis medications and need to assess response to drug therapy.   Last bone density test (DXA Scan): 03/01/2022.  HIV Screening: covered annually if you're between the age of 15-65. Also covered annually if you are younger than 15 and older than 65 with risk factors for HIV infection. For pregnant patients, it is covered up to 3 times per  pregnancy.    Immunizations:  Immunization Recommendations   Influenza Vaccine Annual influenza vaccination during flu season is recommended for all persons aged >= 6 months who do not have contraindications   Pneumococcal Vaccine   * Pneumococcal conjugate vaccine = PCV13 (Prevnar 13), PCV15 (Vaxneuvance), PCV20 (Prevnar 20)  * Pneumococcal polysaccharide vaccine = PPSV23 (Pneumovax) Adults 19-63 yo with certain risk factors or if 65+ yo  If never received any pneumonia vaccine: recommend Prevnar 20 (PCV20)  Give PCV20 if previously received 1 dose of PCV13 or PPSV23   Hepatitis B Vaccine 3 dose series if at intermediate or high risk (ex: diabetes, end stage renal disease, liver disease)   Respiratory syncytial virus (RSV) Vaccine - COVERED BY MEDICARE PART D  * RSVPreF3 (Arexvy) CDC recommends that adults 60 years of age and older may receive a single dose of RSV vaccine using shared clinical decision-making (SCDM)   Tetanus (Td) Vaccine - COST NOT COVERED BY MEDICARE PART B Following completion of primary series, a booster dose should be given every 10 years to maintain immunity against tetanus. Td may also be given as tetanus wound prophylaxis.   Tdap Vaccine - COST NOT COVERED BY MEDICARE PART B Recommended at least once for all adults. For pregnant patients, recommended with each pregnancy.   Shingles Vaccine (Shingrix) - COST NOT COVERED BY MEDICARE PART B  2 shot series recommended in those 19 years and older who have or will have weakened immune systems or those 50 years and older     Health Maintenance Due:      Topic Date Due   • Breast Cancer Screening: Mammogram  12/20/2024   • Colorectal Cancer Screening  08/21/2028   • Hepatitis C Screening  Completed     Immunizations Due:  There are no preventive care reminders to display for this patient.  Advance Directives   What are advance directives?  Advance directives are legal documents that state your wishes and plans for medical care. These plans are made  ahead of time in case you lose your ability to make decisions for yourself. Advance directives can apply to any medical decision, such as the treatments you want, and if you want to donate organs.   What are the types of advance directives?  There are many types of advance directives, and each state has rules about how to use them. You may choose a combination of any of the following:  Living will:  This is a written record of the treatment you want. You can also choose which treatments you do not want, which to limit, and which to stop at a certain time. This includes surgery, medicine, IV fluid, and tube feedings.   Durable power of  for healthcare (DPAHC):  This is a written record that states who you want to make healthcare choices for you when you are unable to make them for yourself. This person, called a proxy, is usually a family member or a friend. You may choose more than 1 proxy.  Do not resuscitate (DNR) order:  A DNR order is used in case your heart stops beating or you stop breathing. It is a request not to have certain forms of treatment, such as CPR. A DNR order may be included in other types of advance directives.  Medical directive:  This covers the care that you want if you are in a coma, near death, or unable to make decisions for yourself. You can list the treatments you want for each condition. Treatment may include pain medicine, surgery, blood transfusions, dialysis, IV or tube feedings, and a ventilator (breathing machine).  Values history:  This document has questions about your views, beliefs, and how you feel and think about life. This information can help others choose the care that you would choose.  Why are advance directives important?  An advance directive helps you control your care. Although spoken wishes may be used, it is better to have your wishes written down. Spoken wishes can be misunderstood, or not followed. Treatments may be given even if you do not want them. An  advance directive may make it easier for your family to make difficult choices about your care.   Urinary Incontinence   Urinary incontinence (UI)  is when you lose control of your bladder. UI develops because your bladder cannot store or empty urine properly. The 3 most common types of UI are stress incontinence, urge incontinence, or both.  Medicines:   May be given to help strengthen your bladder control. Report any side effects of medication to your healthcare provider.  Do pelvic muscle exercises often:  Your pelvic muscles help you stop urinating. Squeeze these muscles tight for 5 seconds, then relax for 5 seconds. Gradually work up to squeezing for 10 seconds. Do 3 sets of 15 repetitions a day, or as directed. This will help strengthen your pelvic muscles and improve bladder control.  Train your bladder:  Go to the bathroom at set times, such as every 2 hours, even if you do not feel the urge to go. You can also try to hold your urine when you feel the urge to go. For example, hold your urine for 5 minutes when you feel the urge to go. As that becomes easier, hold your urine for 10 minutes.   Self-care:   Keep a UI record.  Write down how often you leak urine and how much you leak. Make a note of what you were doing when you leaked urine.  Drink liquids as directed. You may need to limit the amount of liquid you drink to help control your urine leakage. Do not drink any liquid right before you go to bed. Limit or do not have drinks that contain caffeine or alcohol.   Prevent constipation.  Eat a variety of high-fiber foods. Good examples are high-fiber cereals, beans, vegetables, and whole-grain breads. Walking is the best way to trigger your intestines to have a bowel movement.  Exercise regularly and maintain a healthy weight.  Weight loss and exercise will decrease pressure on your bladder and help you control your leakage.   Use a catheter as directed  to help empty your bladder. A catheter is a tiny,  plastic tube that is put into your bladder to drain your urine.   Go to behavior therapy as directed.  Behavior therapy may be used to help you learn to control your urge to urinate.     © Copyright [employer] 2018 Information is for End User's use only and may not be sold, redistributed or otherwise used for commercial purposes. All illustrations and images included in CareNotes® are the copyrighted property of [employer] or [employer]

## 2025-03-19 NOTE — ASSESSMENT & PLAN NOTE
Orders:    levothyroxine 112 mcg tablet; Take 1 tablet (112 mcg total) by mouth daily  The TSH is on the higher side and she complains of fatigue and inability to lose weight.  Was advised to take higher dose of levothyroxine and will recheck labs in 6 months

## 2025-03-19 NOTE — PROGRESS NOTES
Name: Lainey Baldwin      : 1955      MRN: [id number]  Encounter Provider: Brandi Rogers MD  Encounter Date: 3/19/2025   Encounter department: [facility] [place]    Assessment & Plan  Mixed hyperlipidemia    Orders:    CBC and differential; Future    Comprehensive metabolic panel; Future    Lipid panel; Future    TSH, 3rd generation; Future  Was advised to continue taking the atorvastatin regularly  Acquired hypothyroidism    Orders:    levothyroxine 112 mcg tablet; Take 1 tablet (112 mcg total) by mouth daily  The TSH is on the higher side and she complains of fatigue and inability to lose weight.  Was advised to take higher dose of levothyroxine and will recheck labs in 6 months  Irritable bowel syndrome with diarrhea  Has been stable, not on any medication       Mixed stress and urge urinary incontinence  Stable, was told to do Kegel exercises       Osteopenia of multiple sites  Continue calcium and vitamin D       Medicare annual wellness visit, subsequent           Depression Screening and Follow-up Plan: Patient was screened for depression during today's encounter. They screened negative with a PHQ-9 score of 2.        Preventive health issues were discussed with patient, and age appropriate screening tests were ordered as noted in patient's After Visit Summary. Personalized health advice and appropriate referrals for health education or preventive services given if needed, as noted in patient's After Visit Summary.    History of Present Illness     HPI  Patient is here for follow-up of hyperlipidemia, hypothyroidism, IBS, incontinence and osteopenia.  She has a few complaints namely pain in her left elbow for which she has been seeing orthopedics and was suggested topical application of diclofenac gel and Tylenol orally.  She also has pain in her right knee for which she is going for physical therapy currently.  She has brittle nails and was told to try the supplement.  She has been  worried and stressed about her  who was recently diagnosed with amyloidosis.  She is currently going for therapy and does not think she needs any medication for it.      Patient Care Team:  Brandi Rogers MD as PCP - General (Internal Medicine)    Review of Systems   Constitutional:  Negative for chills, diaphoresis, fatigue and fever.   HENT:  Negative for congestion, ear discharge, ear pain, hearing loss, postnasal drip, rhinorrhea, sinus pressure, sinus pain, sneezing, sore throat and voice change.    Eyes:  Negative for pain, discharge, redness and visual disturbance.   Respiratory:  Negative for cough, chest tightness, shortness of breath and wheezing.    Cardiovascular:  Negative for chest pain, palpitations and leg swelling.   Gastrointestinal:  Negative for abdominal distention, abdominal pain, blood in stool, constipation, diarrhea, nausea and vomiting.   Endocrine: Negative for cold intolerance, heat intolerance, polydipsia, polyphagia and polyuria.   Genitourinary:  Negative for dysuria, flank pain, frequency, hematuria and urgency.   Musculoskeletal:  Positive for arthralgias. Negative for back pain, gait problem, joint swelling, myalgias, neck pain and neck stiffness.   Skin:  Negative for rash.   Neurological:  Negative for dizziness, tremors, syncope, facial asymmetry, speech difficulty, weakness, light-headedness, numbness and headaches.   Hematological:  Does not bruise/bleed easily.   Psychiatric/Behavioral:  Positive for dysphoric mood. Negative for behavioral problems, confusion and sleep disturbance. The patient is not nervous/anxious.      Medical History Reviewed by provider this encounter:  Tobacco  Allergies  Meds  Problems  Med Hx  Surg Hx  Fam Hx       Annual Wellness Visit Questionnaire   Lainey is here for her Subsequent Wellness visit.     Health Risk Assessment:   Patient rates overall health as very good. Patient feels that their physical health rating is same. Patient  is satisfied with their life. Eyesight was rated as slightly worse. Hearing was rated as slightly worse. Patient feels that their emotional and mental health rating is same. Patients states they are never, rarely angry. Patient states they are sometimes unusually tired/fatigued. Pain experienced in the last 7 days has been some. Patient's pain rating has been 3/10. Patient states that she has experienced no weight loss or gain in last 6 months.     Depression Screening:   PHQ-9 Score: 2      Fall Risk Screening:   In the past year, patient has experienced: no history of falling in past year      Urinary Incontinence Screening:   Patient has leaked urine accidently in the last six months.     Home Safety:  Patient does not have trouble with stairs inside or outside of their home. Patient has working smoke alarms and has working carbon monoxide detector. Home safety hazards include: none.     Nutrition:   Current diet is Regular.     Medications:   Patient is currently taking over-the-counter supplements. OTC medications include: see medication list. Patient is able to manage medications.     Activities of Daily Living (ADLs)/Instrumental Activities of Daily Living (IADLs):   Walk and transfer into and out of bed and chair?: Yes  Dress and groom yourself?: Yes    Bathe or shower yourself?: Yes    Feed yourself? Yes  Do your laundry/housekeeping?: Yes  Manage your money, pay your bills and track your expenses?: Yes  Make your own meals?: Yes    Do your own shopping?: Yes    Previous Hospitalizations:   Any hospitalizations or ED visits within the last 12 months?: No      Advance Care Planning:   Living will: Yes    Durable POA for healthcare: Yes    Advanced directive: Yes      PREVENTIVE SCREENINGS      Cardiovascular Screening:    General: Screening Not Indicated and History Lipid Disorder      Diabetes Screening:     General: Screening Current      Colorectal Cancer Screening:     General: Screening Current       Breast Cancer Screening:     General: Screening Current      Cervical Cancer Screening:    General: Screening Not Indicated      Lung Cancer Screening:     General: Screening Not Indicated      Hepatitis C Screening:    General: Screening Current    Screening, Brief Intervention, and Referral to Treatment (SBIRT)     Screening  Typical number of drinks in a day: 0  Typical number of drinks in a week: 0  Interpretation: Low risk drinking behavior.    AUDIT-C Screenin) How often did you have a drink containing alcohol in the past year? monthly or less  2) How many drinks did you have on a typical day when you were drinking in the past year? 1 to 2  3) How often did you have 6 or more drinks on one occasion in the past year? never    AUDIT-C Score: 1  Interpretation: Score 0-2 (female): Negative screen for alcohol misuse    Single Item Drug Screening:  How often have you used an illegal drug (including marijuana) or a prescription medication for non-medical reasons in the past year? never    Single Item Drug Screen Score: 0  Interpretation: Negative screen for possible drug use disorder    Social Drivers of Health     Financial Resource Strain: Low Risk  (2023)    Overall Financial Resource Strain (CARDIA)     Difficulty of Paying Living Expenses: Not hard at all   Food Insecurity: No Food Insecurity (3/17/2025)    Hunger Vital Sign     Worried About Running Out of Food in the Last Year: Never true     Ran Out of Food in the Last Year: Never true   Transportation Needs: No Transportation Needs (3/17/2025)    PRAPARE - Transportation     Lack of Transportation (Medical): No     Lack of Transportation (Non-Medical): No   Housing Stability: Low Risk  (3/17/2025)    Housing Stability Vital Sign     Unable to Pay for Housing in the Last Year: No     Number of Times Moved in the Last Year: 0     Homeless in the Last Year: No   Utilities: Not At Risk (3/17/2025)    [facility] Utilities     Threatened with loss of  "utilities: No     No results found.    Objective   BP (S) 110/62 (BP Location: Left arm, Patient Position: Sitting, Cuff Size: Standard)   Pulse 59   Temp 97.9 °F (36.6 °C) (Temporal)   Resp 18   Ht 5' 5\" (1.651 m)   Wt 66.4 kg (146 lb 6.4 oz)   SpO2 95%   BMI 24.36 kg/m²     Physical Exam  Constitutional:       General: She is not in acute distress.     Appearance: She is well-developed. She is not diaphoretic.   HENT:      Head: Normocephalic and atraumatic.      Right Ear: External ear normal.      Left Ear: External ear normal.      Nose: Nose normal.   Eyes:      General: No scleral icterus.        Right eye: No discharge.         Left eye: No discharge.      Conjunctiva/sclera: Conjunctivae normal.   Cardiovascular:      Rate and Rhythm: Normal rate and regular rhythm.      Heart sounds: Normal heart sounds. No murmur heard.     No friction rub. No gallop.   Pulmonary:      Effort: Pulmonary effort is normal. No respiratory distress.      Breath sounds: Normal breath sounds. No wheezing or rales.   Abdominal:      General: Bowel sounds are normal. There is no distension.      Palpations: Abdomen is soft.      Tenderness: There is no abdominal tenderness. There is no guarding or rebound.   Musculoskeletal:         General: Tenderness present.   Skin:     General: Skin is warm and dry.      Findings: No erythema or rash.   Neurological:      Mental Status: She is alert and oriented to person, place, and time.      Cranial Nerves: No cranial nerve deficit.      Sensory: No sensory deficit.      Motor: No abnormal muscle tone.   Psychiatric:         Behavior: Behavior normal.         "

## 2025-03-21 ENCOUNTER — OFFICE VISIT (OUTPATIENT)
Dept: PHYSICAL THERAPY | Facility: REHABILITATION | Age: 70
End: 2025-03-21
Payer: MEDICARE

## 2025-03-21 DIAGNOSIS — G89.29 CHRONIC PAIN OF RIGHT KNEE: ICD-10-CM

## 2025-03-21 DIAGNOSIS — M25.561 CHRONIC PAIN OF RIGHT KNEE: ICD-10-CM

## 2025-03-21 DIAGNOSIS — M25.522 PAIN IN LEFT ELBOW: Primary | ICD-10-CM

## 2025-03-21 PROCEDURE — 97112 NEUROMUSCULAR REEDUCATION: CPT | Performed by: PHYSICAL THERAPIST

## 2025-03-21 PROCEDURE — 97140 MANUAL THERAPY 1/> REGIONS: CPT | Performed by: PHYSICAL THERAPIST

## 2025-03-21 PROCEDURE — 97110 THERAPEUTIC EXERCISES: CPT | Performed by: PHYSICAL THERAPIST

## 2025-03-21 PROCEDURE — 97530 THERAPEUTIC ACTIVITIES: CPT | Performed by: PHYSICAL THERAPIST

## 2025-03-21 NOTE — PROGRESS NOTES
"Daily Note     Today's date: 3/21/2025  Patient name: Lainey Baldwin  : 1955  MRN: [id number]  Referring provider: Maricruz Belle,*  Dx:   Encounter Diagnosis     ICD-10-CM    1. Pain in left elbow  M25.522       2. Chronic pain of right knee  M25.561     G89.29           Start Time: 1105  Stop Time: 1203  Total time in clinic (min): 58 minutes    Subjective: Patient reports some days she has no pain in the knee and other days it is mild. It can be bothersome when doing squats but does not limit her from doing them. She reports no issues from elbow exercises initiated last week but no changes in symptoms.     Objective: See treatment diary below      Assessment: Tolerated treatment well. Initiated plan of care this visit with a focus on single leg eccentric quadriceps strengthening and left wrist extensor strengthening with overall good tolerance but evident muscle fatigue noted. Positive response to manual therapy and provided patient with updated HEP. Patient demonstrated fatigue post treatment, exhibited good technique with therapeutic exercises, and would benefit from continued PT.      Plan: Continue per plan of care.        L4/L5 SPINAL FUSION IN , SACRAL STIMULATOR PLACED  FOR FECAL AND URINARY INCONTINENCE    Mild R foot pronation and heel lift with squat     Daily Treatment Diary  * Indicates part of HEP     Manual 3/6/2025 3/21                    NMR           Heel raises with tennis ball 3x10 3x10         Short foot standing nv                                                      TE           Standing Gastroc stretch 1/2 foam 3x30\" HEP         Standing Soleus Stretch 1/2 foam 3x30\" HEP                                          TA/Closed Chain           Lateral Band walks           Leg Press           Step ups to march - RL* nv 6\" 3x12         Lateral step downs nv 4\" 3x10         Anterior step downs* nv 4\" 3x10         Forward lunge (picking up weights at gym)           Single leg " "sit to stand nv Elevated hi lo 2x10         Single leg squat nv          Modalities                      * = on hep  Manuals 3/14 3/21         IASTM + TFM extensor tendon/musculature nv Done b/l                               Neuro RE-ed           Median nerve glides           Radial nerve glides                      Wrist extension eccentrics 5# 10x (or GTB) 5# 3x8         Radial deviation eccentrics 5# 10x (or GTB) 5# 3x10                                          Therapeutic Ex           Wrist extensor stretch 3x30\" 3x30\"         Hammer curls nv 8# 2x10         Forearm supination/pronation w DB* nv 5# 3x10         Flexbar twists nv          Forearm supination flexbar            Forearm pronation flexbar           Front raises nv nv         Lateral raises           Digiflex  squeezes           Finger web wrist rotations                      Therapeutic Activity           [place] walks           Farmer walks w towel                                                                "

## 2025-03-28 ENCOUNTER — HOSPITAL ENCOUNTER (OUTPATIENT)
Dept: MAMMOGRAPHY | Facility: CLINIC | Age: 70
End: 2025-03-28
Payer: MEDICARE

## 2025-03-28 ENCOUNTER — APPOINTMENT (OUTPATIENT)
Dept: PHYSICAL THERAPY | Facility: REHABILITATION | Age: 70
End: 2025-03-28
Payer: MEDICARE

## 2025-03-28 ENCOUNTER — HOSPITAL ENCOUNTER (OUTPATIENT)
Dept: ULTRASOUND IMAGING | Facility: CLINIC | Age: 70
End: 2025-03-28
Payer: MEDICARE

## 2025-03-28 DIAGNOSIS — Z12.31 ENCOUNTER FOR SCREENING MAMMOGRAM FOR MALIGNANT NEOPLASM OF BREAST: ICD-10-CM

## 2025-03-28 DIAGNOSIS — R92.30 DENSE BREAST: ICD-10-CM

## 2025-03-28 PROCEDURE — 77063 BREAST TOMOSYNTHESIS BI: CPT

## 2025-03-28 PROCEDURE — 77067 SCR MAMMO BI INCL CAD: CPT

## 2025-03-28 PROCEDURE — 76641 ULTRASOUND BREAST COMPLETE: CPT

## 2025-04-02 ENCOUNTER — TELEPHONE (OUTPATIENT)
Dept: OBGYN CLINIC | Facility: CLINIC | Age: 70
End: 2025-04-02

## 2025-04-02 ENCOUNTER — RESULTS FOLLOW-UP (OUTPATIENT)
Dept: OBGYN CLINIC | Facility: CLINIC | Age: 70
End: 2025-04-02

## 2025-04-02 NOTE — TELEPHONE ENCOUNTER
Patient called the RX Refill Line. Message is being forwarded to the office.     Patient is requesting a refill on   Estradiol (Estring) 7.5 MCG/24HR RING. She was not sure why it was discontinued. Please contact patient to discuss further. Thank you.      Please contact patient at [phone]

## 2025-04-03 DIAGNOSIS — N95.1 MENOPAUSAL SYMPTOMS: Primary | ICD-10-CM

## 2025-04-03 DIAGNOSIS — N95.2 VAGINAL ATROPHY: ICD-10-CM

## 2025-04-03 RX ORDER — ESTRADIOL 2 MG/1
1 RING VAGINAL
Qty: 1 EACH | Refills: 3 | Status: SHIPPED | OUTPATIENT
Start: 2025-04-03

## 2025-04-03 NOTE — TELEPHONE ENCOUNTER
I see no medical reason to explain why the Estring was discontinued. She should discuss this further with her internal medicine physician. For now, I will approve the refill. Thanks!

## 2025-04-03 NOTE — TELEPHONE ENCOUNTER
Call made to patient and reviewed provider recommendations. Patient verbalized understanding. States she was not on Estring for a while because it was not in stock. She is hoping pharmacy will have medication for pick-up. She denies further questions at this time.

## 2025-04-18 ENCOUNTER — APPOINTMENT (OUTPATIENT)
Dept: PHYSICAL THERAPY | Facility: REHABILITATION | Age: 70
End: 2025-04-18
Payer: MEDICARE

## 2025-04-25 ENCOUNTER — APPOINTMENT (OUTPATIENT)
Dept: PHYSICAL THERAPY | Facility: REHABILITATION | Age: 70
End: 2025-04-25
Payer: MEDICARE

## 2025-04-30 ENCOUNTER — OFFICE VISIT (OUTPATIENT)
Dept: PHYSICAL THERAPY | Facility: REHABILITATION | Age: 70
End: 2025-04-30
Payer: MEDICARE

## 2025-04-30 DIAGNOSIS — G89.29 CHRONIC PAIN OF RIGHT KNEE: ICD-10-CM

## 2025-04-30 DIAGNOSIS — M25.561 CHRONIC PAIN OF RIGHT KNEE: ICD-10-CM

## 2025-04-30 DIAGNOSIS — M25.522 PAIN IN LEFT ELBOW: Primary | ICD-10-CM

## 2025-04-30 PROCEDURE — 97140 MANUAL THERAPY 1/> REGIONS: CPT | Performed by: PHYSICAL THERAPIST

## 2025-04-30 PROCEDURE — 97112 NEUROMUSCULAR REEDUCATION: CPT | Performed by: PHYSICAL THERAPIST

## 2025-04-30 PROCEDURE — 97110 THERAPEUTIC EXERCISES: CPT | Performed by: PHYSICAL THERAPIST

## 2025-04-30 NOTE — PROGRESS NOTES
"Daily Note     Today's date: 2025  Patient name: Lainey Baldwin  : 1955  MRN: [id number]  Referring provider: Maricruz Belle,*  Dx:   Encounter Diagnosis     ICD-10-CM    1. Pain in left elbow  M25.522       2. Chronic pain of right knee  M25.561     G89.29           Start Time: 0848  Stop Time: 45  Total time in clinic (min): 57 minutes    Subjective: Patient had one month break from PT secondary to traveling. She reports not being able to complete HEP over the last few weeks. She reports no specific issues with the elbow or knee in the last few weeks secondary to not being very active.      Objective: See treatment diary below      Assessment: Tolerated treatment well. Positive response to manual therapy with patient reporting relief of left elbow soreness post-treatment. Evident challenge and muscle fatigue noted with elbow strengthening this visit. Provided HEP with hammer curls. Otherwise, encourage patient to continue compliance to previous HEP over the next week. Patient demonstrated fatigue post treatment, exhibited good technique with therapeutic exercises, and would benefit from continued PT.      Plan: Continue per plan of care.      L4/L5 SPINAL FUSION IN , SACRAL STIMULATOR PLACED  FOR FECAL AND URINARY INCONTINENCE    Mild R foot pronation and heel lift with squat     Daily Treatment Diary  * Indicates part of HEP     Manual 3/6/2025 3/21 4/30                   NMR           Heel raises with tennis ball 3x10 3x10         Short foot standing nv                                                      TE           Standing Gastroc stretch 1/2 foam 3x30\" HEP         Standing Soleus Stretch 1/2 foam 3x30\" HEP                                          TA/Closed Chain           Lateral Band walks           Leg Press           Step ups to march - * nv 6\" 3x12         Lateral step downs nv 4\" 3x10         Anterior step downs* nv 4\" 3x10         Forward lunge (picking up weights at " "gym)   Split squats         Single leg sit to stand nv Elevated hi lo 2x10 Chair + foam 2x10        Single leg squat nv          Modalities                      * = on hep  Manuals 3/14 3/21 4/30        IASTM + TFM extensor tendon/musculature nv Done b/l Done b/l (post-tx nv)                              Neuro RE-ed           Median nerve glides           Radial nerve glides                      Wrist extension eccentrics 5# 10x (or GTB) 5# 3x8 7# 3x8        Radial deviation eccentrics 5# 10x (or GTB) 5# 3x10 7# 3x10                                         Therapeutic Ex           Wrist extensor stretch 3x30\" 3x30\" 3x30\" b/l        Hammer curls nv 8# 2x10 7# 3x10        Forearm supination/pronation w DB* nv 5# 3x10 7# 3x12        Flexbar twists nv          Forearm supination flexbar            Forearm pronation flexbar           Front raises nv nv 5# 3x10        Lateral raises           Digiflex  squeezes           Finger web wrist rotations                      Therapeutic Activity           [place] walks           Farmer walks w towel                                                                  "

## 2025-05-08 ENCOUNTER — OFFICE VISIT (OUTPATIENT)
Dept: PHYSICAL THERAPY | Facility: REHABILITATION | Age: 70
End: 2025-05-08
Payer: MEDICARE

## 2025-05-08 DIAGNOSIS — G89.29 CHRONIC PAIN OF RIGHT KNEE: ICD-10-CM

## 2025-05-08 DIAGNOSIS — M25.522 PAIN IN LEFT ELBOW: Primary | ICD-10-CM

## 2025-05-08 DIAGNOSIS — M25.561 CHRONIC PAIN OF RIGHT KNEE: ICD-10-CM

## 2025-05-08 PROCEDURE — 97140 MANUAL THERAPY 1/> REGIONS: CPT | Performed by: PHYSICAL THERAPIST

## 2025-05-08 PROCEDURE — 97530 THERAPEUTIC ACTIVITIES: CPT | Performed by: PHYSICAL THERAPIST

## 2025-05-08 PROCEDURE — 97112 NEUROMUSCULAR REEDUCATION: CPT | Performed by: PHYSICAL THERAPIST

## 2025-05-08 NOTE — PROGRESS NOTES
"Daily Note     Today's date: 2025  Patient name: Lainey Baldwin  : 1955  MRN: [id number]  Referring provider: Maricruz Belle,*  Dx:   Encounter Diagnosis     ICD-10-CM    1. Pain in left elbow  M25.522       2. Chronic pain of right knee  M25.561     G89.29           Start Time: 1034  Stop Time: 1118  Total time in clinic (min): 44 minutes    Subjective: Patient reports the elbow is feeling better than it had been. She reports one instance of knee pain while carrying something going downstairs.      Objective: See treatment diary below      Assessment: Tolerated treatment well. Some complaints of knee discomfort at quad and patellar tendons with loading exercises but tolerable. Dynamic knee valgus noted with majority of TE therefore cues progressed for knee tracking and initiated additional TE for hip abduction strengthening. Patient demonstrated fatigue post treatment, exhibited good technique with therapeutic exercises, and would benefit from continued PT.      Plan: Continue per plan of care.      L4/L5 SPINAL FUSION IN , SACRAL STIMULATOR PLACED  FOR FECAL AND URINARY INCONTINENCE    Mild R foot pronation and heel lift with squat     Daily Treatment Diary  * Indicates part of HEP     Manual 3/6/2025 3/21 4/30 5/8                  NMR           Heel raises with tennis ball 3x10 3x10  3x15       Short foot standing nv                                                      TE           Standing Gastroc stretch 1/2 foam 3x30\" HEP         Standing Soleus Stretch 1/2 foam 3x30\" HEP                                          TA/Closed Chain           Lateral Band walks    GTB 3 laps       Leg Press           Step ups to march - RLE* nv 6\" 3x12         Lateral step downs nv 4\" 3x10  4\" 3x10       Anterior step downs* nv 4\" 3x10  4\" 3x10       Forward lunge (picking up weights at gym)   Split squats  Split squats 3x10       Single leg sit to stand nv Elevated hi lo 2x10 Chair + foam 2x10      " "  Single leg squat nv          Step overs     6\" 2x10# 2x10                  Modalities                      * = on hep  Manuals 3/14 3/21 4/30 5/8       IASTM + TFM extensor tendon/musculature nv Done b/l Done b/l (post-tx nv) Done L                             Neuro RE-ed           Median nerve glides           Radial nerve glides                      Wrist extension eccentrics 5# 10x (or GTB) 5# 3x8 7# 3x8 8# 3x8       Radial deviation eccentrics 5# 10x (or GTB) 5# 3x10 7# 3x10        Wrist flexion    8# 3x10                             Therapeutic Ex           Wrist extensor stretch 3x30\" 3x30\" 3x30\" b/l        Hammer curls nv 8# 2x10 7# 3x10        Forearm supination/pronation w DB* nv 5# 3x10 7# 3x12 8# 3x10       Flexbar twists nv          Forearm supination flexbar            Forearm pronation flexbar           Front raises nv nv 5# 3x10        Lateral raises           Digiflex  squeezes           Finger web wrist rotations                      Therapeutic Activity           [place] walks           Farmer walks w towel                                                                    "

## 2025-05-21 ENCOUNTER — OFFICE VISIT (OUTPATIENT)
Dept: PHYSICAL THERAPY | Facility: REHABILITATION | Age: 70
End: 2025-05-21
Attending: NURSE PRACTITIONER
Payer: MEDICARE

## 2025-05-21 ENCOUNTER — PATIENT MESSAGE (OUTPATIENT)
Dept: FAMILY MEDICINE CLINIC | Facility: MEDICAL CENTER | Age: 70
End: 2025-05-21

## 2025-05-21 DIAGNOSIS — M25.522 PAIN IN LEFT ELBOW: Primary | ICD-10-CM

## 2025-05-21 DIAGNOSIS — M25.561 CHRONIC PAIN OF RIGHT KNEE: ICD-10-CM

## 2025-05-21 DIAGNOSIS — G89.29 CHRONIC PAIN OF RIGHT KNEE: ICD-10-CM

## 2025-05-21 PROCEDURE — 97140 MANUAL THERAPY 1/> REGIONS: CPT | Performed by: PHYSICAL THERAPIST

## 2025-05-21 PROCEDURE — 97530 THERAPEUTIC ACTIVITIES: CPT | Performed by: PHYSICAL THERAPIST

## 2025-05-21 NOTE — PROGRESS NOTES
"Daily Note     Today's date: 2025  Patient name: Lainey Baldwin  : 1955  MRN: [id number]  Referring provider: Maricruz Belle,*  Dx:   Encounter Diagnosis     ICD-10-CM    1. Pain in left elbow  M25.522       2. Chronic pain of right knee  M25.561     G89.29           Start Time: 09  Stop Time: 1019  Total time in clinic (min): 41 minutes    Subjective: Patient reports being unable to complete HEP over the last two weeks. She reports no significant problems with her knee and feels the exercises are helping.      Objective: See treatment diary below      Assessment: Tolerated treatment well. Improved awareness of avoiding/correcting dynamic knee valgus. Evident muscle fatigue noted throughout session. Patient demonstrated fatigue post treatment, exhibited good technique with therapeutic exercises, and would benefit from continued PT.      Plan: Potential discharge next visit.     L4/L5 SPINAL FUSION IN , SACRAL STIMULATOR PLACED  FOR FECAL AND URINARY INCONTINENCE    Mild R foot pronation and heel lift with squat     Daily Treatment Diary  * Indicates part of HEP     Manual 3/6/2025 3/21 4/30 5/8 5/21                 NMR           Heel raises with tennis ball 3x10 3x10  3x15       Short foot standing nv                                                      TE           Standing Gastroc stretch 1/2 foam 3x30\" HEP         Standing Soleus Stretch 1/2 foam 3x30\" HEP                                          TA/Closed Chain           Lateral Band walks    GTB 3 laps GTB 3 laps       [place] walks     GTB fwd/bwd 3 laps      Leg Press           Step ups to march - RL* nv 6\" 3x12         Lateral step downs nv 4\" 3x10  4\" 3x10 4\" 3x12      Anterior step downs* nv 4\" 3x10  4\" 3x10 4\" 3x12      Forward lunge (picking up weights at gym)   Split squats  Split squats 3x10 Splint lunges 3x12 b/l      Single leg sit to stand nv Elevated hi lo 2x10 Chair + foam 2x10        Single leg squat nv        " "  Step overs     6\" 2x10# 2x10 6\" 2x10# 2x10      HEP update     nv      Short foot     30x5\" HEP      Modalities                      * = on hep  Manuals 3/14 3/21 4/30 5/8 5/21      IASTM + TFM extensor tendon/musculature nv Done b/l Done b/l (post-tx nv) Done L Done L                            Neuro RE-ed           Median nerve glides           Radial nerve glides                      Wrist extension eccentrics 5# 10x (or GTB) 5# 3x8 7# 3x8 8# 3x8       Radial deviation eccentrics 5# 10x (or GTB) 5# 3x10 7# 3x10        Wrist flexion    8# 3x10                             Therapeutic Ex           Wrist extensor stretch 3x30\" 3x30\" 3x30\" b/l        Hammer curls nv 8# 2x10 7# 3x10        Forearm supination/pronation w DB* nv 5# 3x10 7# 3x12 8# 3x10       Flexbar twists nv          Forearm supination flexbar            Forearm pronation flexbar           Front raises nv nv 5# 3x10        Lateral raises           Digiflex  squeezes           Finger web wrist rotations                      Therapeutic Activity           [place] walks           Farmer walks w towel                                                                      "

## 2025-05-22 DIAGNOSIS — M85.89 OSTEOPENIA OF MULTIPLE SITES: Primary | ICD-10-CM

## 2025-05-29 ENCOUNTER — OFFICE VISIT (OUTPATIENT)
Dept: PHYSICAL THERAPY | Facility: REHABILITATION | Age: 70
End: 2025-05-29
Attending: NURSE PRACTITIONER
Payer: MEDICARE

## 2025-05-29 DIAGNOSIS — M25.522 PAIN IN LEFT ELBOW: Primary | ICD-10-CM

## 2025-05-29 DIAGNOSIS — G89.29 CHRONIC PAIN OF RIGHT KNEE: ICD-10-CM

## 2025-05-29 DIAGNOSIS — M25.561 CHRONIC PAIN OF RIGHT KNEE: ICD-10-CM

## 2025-05-29 PROCEDURE — 97140 MANUAL THERAPY 1/> REGIONS: CPT | Performed by: PHYSICAL THERAPIST

## 2025-05-29 PROCEDURE — 97112 NEUROMUSCULAR REEDUCATION: CPT | Performed by: PHYSICAL THERAPIST

## 2025-05-29 PROCEDURE — 97530 THERAPEUTIC ACTIVITIES: CPT | Performed by: PHYSICAL THERAPIST

## 2025-05-29 NOTE — PROGRESS NOTES
"Daily Note/Discharge Note     Today's date: 2025  Patient name: Lainey Baldwin  : 1955  MRN: [id number]  Referring provider: Maricruz Belle,*  Dx:   Encounter Diagnosis     ICD-10-CM    1. Pain in left elbow  M25.522       2. Chronic pain of right knee  M25.561     G89.29           Start Time: 1204  Stop Time: 1307  Total time in clinic (min): 63 minutes    Subjective: Patient reports doing well overall. She has been doing a lot of yardwork and notes minimal low back, knee and elbow discomfort.      Objective: See treatment diary below      Assessment: Lainey has made the following improvements since beginning PT: decreased pain, increased strength, and increased tolerance to activities. Lainey and PT mutually agree to transition to HEP at this time secondary to gains made in PT. she has been given updated HEP with verbalized understanding and compliance. Lainey is encouraged to contact PT with any questions or concerns in the future.         Plan: Plan to discharge with comprehensive HEP for continued and maintained gains made in PT.       L4/L5 SPINAL FUSION IN , SACRAL STIMULATOR PLACED  FOR FECAL AND URINARY INCONTINENCE    Mild R foot pronation and heel lift with squat     Daily Treatment Diary  * Indicates part of HEP     Manual 3/6/2025 3/21 4/30 5/8 5/21 5/29                NMR           Heel raises with tennis ball 3x10 3x10  3x15       Short foot standing nv                                                      TE           Standing Gastroc stretch 1/2 foam 3x30\" HEP         Standing Soleus Stretch 1/2 foam 3x30\" HEP                                          TA/Closed Chain           Lateral Band walks    GTB 3 laps GTB 3 laps  GTB 3 laps     Monster walks     GTB fwd/bwd 3 laps GTB fwd/bwd 3 laps     Leg Press           Step ups to march - * nv 6\" 3x12    2x10# 6\" 3x12 b/l     Lateral step downs nv 4\" 3x10  4\" 3x10 4\" 3x12 4\" 3x12 b/l     Anterior step downs* nv 4\" 3x10  4\" " "3x10 4\" 3x12      Forward lunge (picking up weights at gym)   Split squats  Split squats 3x10 Splint lunges 3x12 b/l Splint lunges 3x10 b/l      Single leg sit to stand nv Elevated hi lo 2x10 Chair + foam 2x10        Single leg squat nv          Step overs     6\" 2x10# 2x10 6\" 2x10# 2x10 6\" 2x10# 2x10     HEP update     nv      Short foot     30x5\" HEP      Modalities                      * = on hep  Manuals 3/14 3/21 4/30 5/8 5/21 5/29     IASTM + TFM extensor tendon/musculature nv Done b/l Done b/l (post-tx nv) Done L Done L Done L                           Neuro RE-ed           Median nerve glides           Radial nerve glides                      Wrist extension eccentrics 5# 10x (or GTB) 5# 3x8 7# 3x8 8# 3x8  8# 3x10     Radial deviation eccentrics 5# 10x (or GTB) 5# 3x10 7# 3x10        Wrist flexion    8# 3x10  8# 3x10                           Therapeutic Ex           Wrist extensor stretch 3x30\" 3x30\" 3x30\" b/l        Hammer curls nv 8# 2x10 7# 3x10        Forearm supination/pronation w DB* nv 5# 3x10 7# 3x12 8# 3x10  8# 3x10     Flexbar twists nv          Forearm supination flexbar            Forearm pronation flexbar           Front raises nv nv 5# 3x10        Lateral raises           Digiflex  squeezes           Finger web wrist rotations                      Therapeutic Activity           Sewaren walks           Farmer walks w towel                                                                        "

## 2025-06-10 ENCOUNTER — HOSPITAL ENCOUNTER (OUTPATIENT)
Age: 70
Discharge: HOME | End: 2025-06-10
Payer: MEDICARE

## 2025-06-10 VITALS — BODY MASS INDEX: 25.06 KG/M2 | HEIGHT: 64 IN

## 2025-06-10 DIAGNOSIS — N95.1 MENOPAUSAL SYMPTOMS: ICD-10-CM

## 2025-06-10 DIAGNOSIS — M85.89 OSTEOPENIA OF MULTIPLE SITES: ICD-10-CM

## 2025-06-10 DIAGNOSIS — N95.2 VAGINAL ATROPHY: ICD-10-CM

## 2025-06-10 PROCEDURE — 77080 DXA BONE DENSITY AXIAL: CPT

## 2025-06-10 RX ORDER — ESTRADIOL 2 MG/1
1 RING VAGINAL
Qty: 1 EACH | Refills: 1 | Status: SHIPPED | OUTPATIENT
Start: 2025-06-10 | End: 2025-06-18 | Stop reason: SDUPTHER

## 2025-06-10 NOTE — TELEPHONE ENCOUNTER
Reason for call:   [x] Refill   [] Prior Auth  [x] Other: last rx pharmacy didn't add refills, that's why pt needs new rx    Office:   [] PCP/Provider -   [x] Specialty/Provider - Lana Villafana MD-[facility]          Medication:     Estradiol (Estring) 7.5 MCG/24HR RING 1 Ring, Every 3 months         Pharmacy: [id number] - [place] - [address] [phone]     Local Pharmacy   Does the patient have enough for 3 days?   [x] Yes   [] No - Send as HP to POD    Mail Away Pharmacy   Does the patient have enough for 10 days?   [] Yes   [] No - Send as HP to POD

## 2025-06-11 RX ORDER — ESTRADIOL 2 MG/1
1 RING VAGINAL
Qty: 1 EACH | Refills: 0 | OUTPATIENT
Start: 2025-06-11

## 2025-06-16 ENCOUNTER — RESULTS FOLLOW-UP (OUTPATIENT)
Dept: FAMILY MEDICINE CLINIC | Facility: MEDICAL CENTER | Age: 70
End: 2025-06-16

## 2025-06-18 ENCOUNTER — TELEPHONE (OUTPATIENT)
Dept: OBGYN CLINIC | Facility: CLINIC | Age: 70
End: 2025-06-18

## 2025-06-18 DIAGNOSIS — N95.1 MENOPAUSAL SYMPTOMS: ICD-10-CM

## 2025-06-18 DIAGNOSIS — N95.2 VAGINAL ATROPHY: ICD-10-CM

## 2025-06-18 NOTE — TELEPHONE ENCOUNTER
Reason for call:   [x] Refill   [] Prior Auth  [] Other:     Office:   [] PCP/Provider -   [x] Specialty/Provider - [facility]     Medication: Estradiol (Estring) 7.5 MCG/24HR RING     Dose/Frequency: 1 Ring, Every 3 months     Quantity: 1    Pharmacy: [facility]    Local Pharmacy   Does the patient have enough for 3 days?   [x] Yes   [] No - Send as HP to POD    Mail Away Pharmacy   Does the patient have enough for 10 days?   [x] Yes   [] No - Send as HP to POD

## 2025-06-18 NOTE — TELEPHONE ENCOUNTER
Patient called Rx refill line and states jason is no longer able to get her Rx for Estring - she can get it through [facility] and/or a [place] pharmacy - advised pt to contact both for information on cost and then contact us back and let us know which pharmacy she would like the Rx sent to

## 2025-06-19 RX ORDER — ESTRADIOL 2 MG/1
1 RING VAGINAL
Qty: 1 EACH | Refills: 1 | Status: SHIPPED | OUTPATIENT
Start: 2025-06-19

## 2025-07-08 ENCOUNTER — ANNUAL EXAM (OUTPATIENT)
Dept: OBGYN CLINIC | Facility: CLINIC | Age: 70
End: 2025-07-08
Payer: MEDICARE

## 2025-07-08 VITALS
BODY MASS INDEX: 23.99 KG/M2 | HEIGHT: 65 IN | SYSTOLIC BLOOD PRESSURE: 122 MMHG | WEIGHT: 144 LBS | DIASTOLIC BLOOD PRESSURE: 82 MMHG

## 2025-07-08 DIAGNOSIS — N95.2 VAGINAL ATROPHY: ICD-10-CM

## 2025-07-08 DIAGNOSIS — Z12.31 ENCOUNTER FOR SCREENING MAMMOGRAM FOR MALIGNANT NEOPLASM OF BREAST: ICD-10-CM

## 2025-07-08 DIAGNOSIS — N95.1 MENOPAUSAL SYMPTOMS: ICD-10-CM

## 2025-07-08 DIAGNOSIS — Z01.419 ENCOUNTER FOR GYNECOLOGICAL EXAMINATION WITHOUT ABNORMAL FINDING: Primary | ICD-10-CM

## 2025-07-08 DIAGNOSIS — R92.30 DENSE BREAST: ICD-10-CM

## 2025-07-08 PROCEDURE — G0101 CA SCREEN;PELVIC/BREAST EXAM: HCPCS | Performed by: OBSTETRICS & GYNECOLOGY

## 2025-07-08 RX ORDER — ESTRADIOL 2 MG/1
1 RING VAGINAL
Qty: 1 EACH | Refills: 3 | Status: SHIPPED | OUTPATIENT
Start: 2025-07-08

## 2025-07-08 RX ORDER — SODIUM FLUORIDE 5 MG/ML
PASTE, DENTIFRICE DENTAL
COMMUNITY
Start: 2025-06-30
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
## 2025-07-30 DIAGNOSIS — E03.9 ACQUIRED HYPOTHYROIDISM: ICD-10-CM

## 2025-07-30 DIAGNOSIS — E78.2 MIXED HYPERLIPIDEMIA: ICD-10-CM

## 2025-07-30 RX ORDER — ATORVASTATIN CALCIUM 10 MG/1
10 TABLET, FILM COATED ORAL DAILY
Qty: 90 TABLET | Refills: 1 | Status: SHIPPED | OUTPATIENT
Start: 2025-07-30

## 2025-07-30 RX ORDER — LEVOTHYROXINE SODIUM 112 UG/1
112 TABLET ORAL DAILY
Qty: 90 TABLET | Refills: 1 | Status: SHIPPED | OUTPATIENT
Start: 2025-07-30

## 2025-07-31 DIAGNOSIS — Z00.6 ENCOUNTER FOR EXAMINATION FOR NORMAL COMPARISON OR CONTROL IN CLINICAL RESEARCH PROGRAM: ICD-10-CM

## 2025-08-01 ENCOUNTER — APPOINTMENT (OUTPATIENT)
Dept: RADIOLOGY | Facility: CLINIC | Age: 70
End: 2025-08-01
Attending: ORTHOPAEDIC SURGERY
Payer: MEDICARE

## 2025-08-01 ENCOUNTER — TELEPHONE (OUTPATIENT)
Facility: MEDICAL CENTER | Age: 70
End: 2025-08-01

## 2025-08-01 ENCOUNTER — OFFICE VISIT (OUTPATIENT)
Dept: OBGYN CLINIC | Facility: CLINIC | Age: 70
End: 2025-08-01
Payer: MEDICARE

## 2025-08-01 VITALS — BODY MASS INDEX: 24.36 KG/M2 | WEIGHT: 146.2 LBS | HEIGHT: 65 IN

## 2025-08-01 DIAGNOSIS — M17.12 PRIMARY OSTEOARTHRITIS OF LEFT KNEE: ICD-10-CM

## 2025-08-01 DIAGNOSIS — M25.562 LEFT KNEE PAIN, UNSPECIFIED CHRONICITY: ICD-10-CM

## 2025-08-01 DIAGNOSIS — M23.92 ACUTE INTERNAL DERANGEMENT OF LEFT KNEE: Primary | ICD-10-CM

## 2025-08-01 PROCEDURE — 99204 OFFICE O/P NEW MOD 45 MIN: CPT | Performed by: ORTHOPAEDIC SURGERY

## 2025-08-01 PROCEDURE — 73562 X-RAY EXAM OF KNEE 3: CPT

## 2025-08-01 PROCEDURE — 73560 X-RAY EXAM OF KNEE 1 OR 2: CPT

## 2025-08-01 RX ORDER — NAPROXEN 500 MG/1
500 TABLET ORAL 2 TIMES DAILY WITH MEALS
Qty: 28 TABLET | Refills: 1 | Status: SHIPPED | OUTPATIENT
Start: 2025-08-01 | End: 2025-08-15

## 2025-08-07 ENCOUNTER — HOSPITAL ENCOUNTER (OUTPATIENT)
Dept: RADIOLOGY | Facility: HOSPITAL | Age: 70
Discharge: HOME | End: 2025-08-07

## 2025-08-07 ENCOUNTER — APPOINTMENT (OUTPATIENT)
Dept: LAB | Facility: CLINIC | Age: 70
End: 2025-08-07

## 2025-08-07 DIAGNOSIS — Z00.6 ENCOUNTER FOR EXAMINATION FOR NORMAL COMPARISON OR CONTROL IN CLINICAL RESEARCH PROGRAM: ICD-10-CM

## 2025-08-07 PROCEDURE — 36415 COLL VENOUS BLD VENIPUNCTURE: CPT

## 2025-08-18 LAB
APOB+LDLR+PCSK9 GENE MUT ANL BLD/T: NOT DETECTED
BRCA1+BRCA2 DEL+DUP + FULL MUT ANL BLD/T: NOT DETECTED
MLH1+MSH2+MSH6+PMS2 GN DEL+DUP+FUL M: NOT DETECTED

## (undated) DEVICE — NEUROSTIMUL XTRNL 2 IN X 1.7 IN VERIFY

## (undated) DEVICE — BETHLEHEM UNIVERSAL SPINE, KIT: Brand: CARDINAL HEALTH

## (undated) DEVICE — MEDI-VAC YANK SUCT HNDL W/TPRD BULBOUS TIP: Brand: CARDINAL HEALTH

## (undated) DEVICE — ANTIBACTERIAL VIOLET BRAIDED (POLYGLACTIN 910), SYNTHETIC ABSORBABLE SUTURE: Brand: COATED VICRYL

## (undated) DEVICE — 3M™ IOBAN™ 2 ANTIMICROBIAL INCISE DRAPE 6650EZ: Brand: IOBAN™ 2

## (undated) DEVICE — SUT MONOCRYL 4-0 PS-2 18 IN Y496G

## (undated) DEVICE — DRAPE C-ARM X-RAY

## (undated) DEVICE — PLUMEPEN PRO 10FT

## (undated) DEVICE — INTENDED FOR TISSUE SEPARATION, AND OTHER PROCEDURES THAT REQUIRE A SHARP SURGICAL BLADE TO PUNCTURE OR CUT.: Brand: BARD-PARKER SAFETY BLADES SIZE 15, STERILE

## (undated) DEVICE — INTENDED FOR TISSUE SEPARATION, AND OTHER PROCEDURES THAT REQUIRE A SHARP SURGICAL BLADE TO PUNCTURE OR CUT.: Brand: BARD-PARKER ® CARBON RIB-BACK BLADES

## (undated) DEVICE — SUT PROLENE 0 CT-1 30 IN 8424H

## (undated) DEVICE — PROGRAMMER INTERSTIM X SMART KIT

## (undated) DEVICE — DISPOSABLE EQUIPMENT COVER: Brand: SMALL TOWEL DRAPE

## (undated) DEVICE — GAUZE SPONGES,USP TYPE VII GAUZE, 12 PLY: Brand: CURITY

## (undated) DEVICE — DRAPE C-ARMOUR

## (undated) DEVICE — GAUZE SPONGES,16 PLY: Brand: CURITY

## (undated) DEVICE — TIBURON SPLIT SHEET: Brand: CONVERTORS

## (undated) DEVICE — ELECTRODE BLADE MOD  E-Z CLEAN 6.5IN -0014M

## (undated) DEVICE — SURGIFOAM 8.5 X 12.5

## (undated) DEVICE — SUT VICRYL PLUS 3-0 RB-1 CR/8 18 IN VCP713D

## (undated) DEVICE — MEDI-VAC YANKAUER SUCTION HANDLE W/STRAIGHT TIP & CONTROL VENT: Brand: CARDINAL HEALTH

## (undated) DEVICE — HEMOSTATIC MATRIX SURGIFLO 8ML W/THROMBIN

## (undated) DEVICE — INSTRUMENT 8670015 PAK 2 NEEDLES TROCAR

## (undated) DEVICE — TUBING SUCTION 5MM X 12 FT

## (undated) DEVICE — INTENDED FOR TISSUE SEPARATION, AND OTHER PROCEDURES THAT REQUIRE A SHARP SURGICAL BLADE TO PUNCTURE OR CUT.: Brand: BARD-PARKER SAFETY BLADES SIZE 11, STERILE

## (undated) DEVICE — DRAPE LAPAROTOMY W/POUCHES

## (undated) DEVICE — 3M™ STERI-STRIP™ REINFORCED ADHESIVE SKIN CLOSURES, R1547, 1/2 IN X 4 IN (12 MM X 100 MM), 6 STRIPS/ENVELOPE: Brand: 3M™ STERI-STRIP™

## (undated) DEVICE — EXOFIN FUSION SKIN CLOSURE SYSTEM, 30CM: Brand: EXOFIN FUSION SKIN CLOSURE SYSTEM, 30CM

## (undated) DEVICE — GLOVE INDICATOR PI UNDERGLOVE SZ 8.5 BLUE

## (undated) DEVICE — ANTIBACTERIAL UNDYED BRAIDED (POLYGLACTIN 910), SYNTHETIC ABSORBABLE SUTURE: Brand: COATED VICRYL

## (undated) DEVICE — MONITORING SPINAL IMPULSE CASE FEE

## (undated) DEVICE — TOOL 15BA50 LEGEND 15CM 5MM BA: Brand: MIDAS REX™

## (undated) DEVICE — 3M™ TEGADERM™ TRANSPARENT FILM DRESSING FRAME STYLE, 1627, 4 IN X 10 IN (10 CM X 25 CM), 20/CT 4CT/CASE: Brand: 3M™ TEGADERM™

## (undated) DEVICE — GLOVE SRG BIOGEL 8

## (undated) DEVICE — DRAPE SHEET X-LG

## (undated) DEVICE — CHLORAPREP HI-LITE 26ML ORANGE

## (undated) DEVICE — PREP SURGICAL PURPREP 26ML

## (undated) DEVICE — 3M™ TEGADERM™ TRANSPARENT FILM DRESSING FRAME STYLE, 1628, 6 IN X 8 IN (15 CM X 20 CM), 10/CT 8CT/CASE: Brand: 3M™ TEGADERM™

## (undated) DEVICE — EXOFIN PRECISION PEN HIGH VISCOSITY TOPICAL SKIN ADHESIVE: Brand: EXOFIN PRECISION PEN, 1G

## (undated) DEVICE — DRESSING MEPORE FILM ADHESIVE 4 X 5IN

## (undated) DEVICE — BETHLEHEM UNIVERSAL MINOR GEN: Brand: CARDINAL HEALTH

## (undated) DEVICE — SUPPLY FEE STD

## (undated) DEVICE — SNAP KOVER: Brand: UNBRANDED

## (undated) DEVICE — TRAY FOLEY 16FR URIMETER SURESTEP

## (undated) DEVICE — GUIDEWIRE 2345050 BLUNT THREADED 24IN

## (undated) DEVICE — PENCIL ELECTROSURG E-Z CLEAN -0035H

## (undated) DEVICE — 3M™ TEGADERM™ TRANSPARENT FILM DRESSING FRAME STYLE, 1624W, 2-3/8 IN X 2-3/4 IN (6 CM X 7 CM), 100/CT 4CT/CASE: Brand: 3M™ TEGADERM™

## (undated) DEVICE — DRESSING MEPILEX AG BORDER 4 X 4 IN

## (undated) DEVICE — SPONGE PVP SCRUB WING STERILE

## (undated) DEVICE — DRAPE MICROSCOPE OPMI PENTERO